# Patient Record
Sex: MALE | Race: OTHER | HISPANIC OR LATINO | Employment: OTHER | URBAN - METROPOLITAN AREA
[De-identification: names, ages, dates, MRNs, and addresses within clinical notes are randomized per-mention and may not be internally consistent; named-entity substitution may affect disease eponyms.]

---

## 2024-11-03 ENCOUNTER — APPOINTMENT (OUTPATIENT)
Dept: RADIOLOGY | Facility: HOSPITAL | Age: 74
End: 2024-11-03
Payer: COMMERCIAL

## 2024-11-03 ENCOUNTER — APPOINTMENT (OUTPATIENT)
Dept: CARDIOLOGY | Facility: HOSPITAL | Age: 74
End: 2024-11-03
Payer: COMMERCIAL

## 2024-11-03 ENCOUNTER — HOSPITAL ENCOUNTER (EMERGENCY)
Facility: HOSPITAL | Age: 74
Discharge: HOME | End: 2024-11-03
Attending: EMERGENCY MEDICINE
Payer: COMMERCIAL

## 2024-11-03 VITALS
OXYGEN SATURATION: 96 % | TEMPERATURE: 98.1 F | HEIGHT: 70 IN | HEART RATE: 71 BPM | DIASTOLIC BLOOD PRESSURE: 64 MMHG | RESPIRATION RATE: 18 BRPM | SYSTOLIC BLOOD PRESSURE: 158 MMHG | WEIGHT: 148 LBS | BODY MASS INDEX: 21.19 KG/M2

## 2024-11-03 DIAGNOSIS — R53.1 GENERALIZED WEAKNESS: Primary | ICD-10-CM
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
## 2024-11-03 DIAGNOSIS — E44.0 MODERATE PROTEIN-CALORIE MALNUTRITION (MULTI): ICD-10-CM

## 2024-11-03 LAB
ALBUMIN SERPL BCP-MCNC: 3 G/DL (ref 3.4–5)
ALP SERPL-CCNC: 77 U/L (ref 33–136)
ALT SERPL W P-5'-P-CCNC: 15 U/L (ref 10–52)
ANION GAP SERPL CALC-SCNC: 13 MMOL/L (ref 10–20)
AST SERPL W P-5'-P-CCNC: 15 U/L (ref 9–39)
BASOPHILS # BLD AUTO: 0.01 X10*3/UL (ref 0–0.1)
BASOPHILS NFR BLD AUTO: 0.3 %
BILIRUB SERPL-MCNC: 0.6 MG/DL (ref 0–1.2)
BUN SERPL-MCNC: 23 MG/DL (ref 6–23)
CALCIUM SERPL-MCNC: 8.2 MG/DL (ref 8.6–10.3)
CARDIAC TROPONIN I PNL SERPL HS: 22 NG/L (ref 0–20)
CARDIAC TROPONIN I PNL SERPL HS: 24 NG/L (ref 0–20)
CHLORIDE SERPL-SCNC: 100 MMOL/L (ref 98–107)
CO2 SERPL-SCNC: 27 MMOL/L (ref 21–32)
CREAT SERPL-MCNC: 1.2 MG/DL (ref 0.5–1.3)
EGFRCR SERPLBLD CKD-EPI 2021: 63 ML/MIN/1.73M*2
EOSINOPHIL # BLD AUTO: 0.01 X10*3/UL (ref 0–0.4)
EOSINOPHIL NFR BLD AUTO: 0.3 %
ERYTHROCYTE [DISTWIDTH] IN BLOOD BY AUTOMATED COUNT: 13.7 % (ref 11.5–14.5)
FLUAV RNA RESP QL NAA+PROBE: NOT DETECTED
FLUBV RNA RESP QL NAA+PROBE: NOT DETECTED
GLUCOSE SERPL-MCNC: 114 MG/DL (ref 74–99)
HCT VFR BLD AUTO: 29.7 % (ref 41–52)
HGB BLD-MCNC: 9.6 G/DL (ref 13.5–17.5)
HOLD SPECIMEN: NORMAL
IMM GRANULOCYTES # BLD AUTO: 0.03 X10*3/UL (ref 0–0.5)
IMM GRANULOCYTES NFR BLD AUTO: 1 % (ref 0–0.9)
LYMPHOCYTES # BLD AUTO: 0.44 X10*3/UL (ref 0.8–3)
LYMPHOCYTES NFR BLD AUTO: 15 %
MAGNESIUM SERPL-MCNC: 1.92 MG/DL (ref 1.6–2.4)
MCH RBC QN AUTO: 28.2 PG (ref 26–34)
MCHC RBC AUTO-ENTMCNC: 32.3 G/DL (ref 32–36)
MCV RBC AUTO: 87 FL (ref 80–100)
MONOCYTES # BLD AUTO: 0.32 X10*3/UL (ref 0.05–0.8)
MONOCYTES NFR BLD AUTO: 10.9 %
NEUTROPHILS # BLD AUTO: 2.13 X10*3/UL (ref 1.6–5.5)
NEUTROPHILS NFR BLD AUTO: 72.5 %
NRBC BLD-RTO: 0 /100 WBCS (ref 0–0)
PLATELET # BLD AUTO: 127 X10*3/UL (ref 150–450)
POTASSIUM SERPL-SCNC: 3.5 MMOL/L (ref 3.5–5.3)
PROT SERPL-MCNC: 4.9 G/DL (ref 6.4–8.2)
RBC # BLD AUTO: 3.4 X10*6/UL (ref 4.5–5.9)
SARS-COV-2 RNA RESP QL NAA+PROBE: NOT DETECTED
SODIUM SERPL-SCNC: 136 MMOL/L (ref 136–145)
WBC # BLD AUTO: 2.9 X10*3/UL (ref 4.4–11.3)

## 2024-11-03 PROCEDURE — 71045 X-RAY EXAM CHEST 1 VIEW: CPT | Performed by: RADIOLOGY

## 2024-11-03 PROCEDURE — 51798 US URINE CAPACITY MEASURE: CPT

## 2024-11-03 PROCEDURE — 85025 COMPLETE CBC W/AUTO DIFF WBC: CPT

## 2024-11-03 PROCEDURE — 83735 ASSAY OF MAGNESIUM: CPT

## 2024-11-03 PROCEDURE — 84484 ASSAY OF TROPONIN QUANT: CPT

## 2024-11-03 PROCEDURE — 93005 ELECTROCARDIOGRAM TRACING: CPT

## 2024-11-03 PROCEDURE — 80053 COMPREHEN METABOLIC PANEL: CPT

## 2024-11-03 PROCEDURE — 36415 COLL VENOUS BLD VENIPUNCTURE: CPT

## 2024-11-03 PROCEDURE — 36415 COLL VENOUS BLD VENIPUNCTURE: CPT | Performed by: EMERGENCY MEDICINE

## 2024-11-03 PROCEDURE — 71045 X-RAY EXAM CHEST 1 VIEW: CPT

## 2024-11-03 PROCEDURE — 84484 ASSAY OF TROPONIN QUANT: CPT | Performed by: EMERGENCY MEDICINE

## 2024-11-03 PROCEDURE — 87636 SARSCOV2 & INF A&B AMP PRB: CPT

## 2024-11-03 PROCEDURE — 72220 X-RAY EXAM SACRUM TAILBONE: CPT

## 2024-11-03 PROCEDURE — 72220 X-RAY EXAM SACRUM TAILBONE: CPT | Performed by: RADIOLOGY

## 2024-11-03 PROCEDURE — 99284 EMERGENCY DEPT VISIT MOD MDM: CPT | Mod: 25

## 2024-11-03 ASSESSMENT — PAIN SCALES - GENERAL: PAINLEVEL_OUTOF10: 6

## 2024-11-03 ASSESSMENT — COLUMBIA-SUICIDE SEVERITY RATING SCALE - C-SSRS
2. HAVE YOU ACTUALLY HAD ANY THOUGHTS OF KILLING YOURSELF?: NO
6. HAVE YOU EVER DONE ANYTHING, STARTED TO DO ANYTHING, OR PREPARED TO DO ANYTHING TO END YOUR LIFE?: NO
1. IN THE PAST MONTH, HAVE YOU WISHED YOU WERE DEAD OR WISHED YOU COULD GO TO SLEEP AND NOT WAKE UP?: NO

## 2024-11-03 ASSESSMENT — LIFESTYLE VARIABLES
EVER HAD A DRINK FIRST THING IN THE MORNING TO STEADY YOUR NERVES TO GET RID OF A HANGOVER: NO
EVER FELT BAD OR GUILTY ABOUT YOUR DRINKING: NO
TOTAL SCORE: 0
HAVE PEOPLE ANNOYED YOU BY CRITICIZING YOUR DRINKING: NO
HAVE YOU EVER FELT YOU SHOULD CUT DOWN ON YOUR DRINKING: NO

## 2024-11-03 ASSESSMENT — PAIN - FUNCTIONAL ASSESSMENT: PAIN_FUNCTIONAL_ASSESSMENT: 0-10

## 2024-11-04 LAB
ATRIAL RATE: 70 BPM
P AXIS: 75 DEGREES
P OFFSET: 192 MS
P ONSET: 149 MS
PR INTERVAL: 146 MS
Q ONSET: 222 MS
QRS COUNT: 11 BEATS
QRS DURATION: 82 MS
QT INTERVAL: 402 MS
QTC CALCULATION(BAZETT): 434 MS
QTC FREDERICIA: 423 MS
R AXIS: -36 DEGREES
T AXIS: 30 DEGREES
T OFFSET: 423 MS
VENTRICULAR RATE: 70 BPM

## 2024-11-04 NOTE — ED PROVIDER NOTES
EMERGENCY DEPARTMENT ENCOUNTER      Pt Name: Josias Ramirez  MRN: [id number]  Birthdate 1950  Date of evaluation: 11/3/2024  Provider: Bryon Landry DO    CHIEF COMPLAINT       Chief Complaint   Patient presents with    Fall    Weakness, Gen     HISTORY OF PRESENT ILLNESS    Josias Ramirez is a 74 y.o. year old male who presents to the ER for a fall.  The patient complains of generalized weakness which is why he fell.  Patient dropped his cane and when he bent over to pick it up he fell backwards.  Patient denies hitting his head, LOC, AMS, loss of bowel and bladder control.  The patient complains of sacral pain he landed.  Patient denies any radiation of the pain, reports that it is just sore.  The patient denies flulike symptoms.     Past medical history significant for squamous cell cancer of the nose, lung cancer, on chemo and radiation.  Last ministration of chemo and radiation was last week.  Patient has diabetes, hyper cholesterolemia, constipation.  PAST MEDICAL HISTORY   History reviewed. No pertinent past medical history.  CURRENT MEDICATIONS       There are no discharge medications for this patient.    SURGICAL HISTORY     History reviewed. No pertinent surgical history.  ALLERGIES     Penicillins and Tramadol  FAMILY HISTORY     No family history on file.  SOCIAL HISTORY       Social History     Tobacco Use    Smoking status: Not on file    Smokeless tobacco: Not on file   Substance Use Topics    Alcohol use: Not on file    Drug use: Not on file     PHYSICAL EXAM  (up to 7 for level 4, 8 or more for level 5)     ED Triage Vitals [11/03/24 1852]   Temperature Heart Rate Respirations BP   36.7 °C (98.1 °F) 82 18 148/51      Pulse Ox Temp src Heart Rate Source Patient Position   100 % -- -- --      BP Location FiO2 (%)     -- --       Physical Exam  Constitutional:       Appearance: He is cachectic. He is not toxic-appearing or diaphoretic.      Comments: Cachectic in appearance   HENT:      Head:  Normocephalic.      Nose: Nasal deformity present.      Comments: Deformity of the nose due to squamous cell cancer.  Eyes:      Conjunctiva/sclera: Conjunctivae normal.   Cardiovascular:      Rate and Rhythm: Normal rate and regular rhythm.   Pulmonary:      Effort: Pulmonary effort is normal.      Breath sounds: Normal breath sounds.   Abdominal:      General: Abdomen is flat.   Musculoskeletal:         General: No swelling.   Skin:     General: Skin is warm.        DIAGNOSTIC RESULTS   LABS:  Labs Reviewed   CBC WITH AUTO DIFFERENTIAL - Abnormal       Result Value    WBC 2.9 (*)     nRBC 0.0      RBC 3.40 (*)     Hemoglobin 9.6 (*)     Hematocrit 29.7 (*)     MCV 87      MCH 28.2      MCHC 32.3      RDW 13.7      Platelets 127 (*)     Neutrophils % 72.5      Immature Granulocytes %, Automated 1.0 (*)     Lymphocytes % 15.0      Monocytes % 10.9      Eosinophils % 0.3      Basophils % 0.3      Neutrophils Absolute 2.13      Immature Granulocytes Absolute, Automated 0.03      Lymphocytes Absolute 0.44 (*)     Monocytes Absolute 0.32      Eosinophils Absolute 0.01      Basophils Absolute 0.01     COMPREHENSIVE METABOLIC PANEL - Abnormal    Glucose 114 (*)     Sodium 136      Potassium 3.5      Chloride 100      Bicarbonate 27      Anion Gap 13      Urea Nitrogen 23      Creatinine 1.20      eGFR 63      Calcium 8.2 (*)     Albumin 3.0 (*)     Alkaline Phosphatase 77      Total Protein 4.9 (*)     AST 15      Bilirubin, Total 0.6      ALT 15     TROPONIN I, HIGH SENSITIVITY - Abnormal    Troponin I, High Sensitivity 22 (*)     Narrative:     Less than 99th percentile of normal range cutoff-  Female and children under 18 years old <14 ng/L; Male <21 ng/L: Negative  Repeat testing should be performed if clinically indicated.     Female and children under 18 years old 14-50 ng/L; Male 21-50 ng/L:  Consistent with possible cardiac damage and possible increased clinical   risk. Serial measurements may help to assess  extent of myocardial damage.     >50 ng/L: Consistent with cardiac damage, increased clinical risk and  myocardial infarction. Serial measurements may help assess extent of   myocardial damage.      NOTE: Children less than 1 year old may have higher baseline troponin   levels and results should be interpreted in conjunction with the overall   clinical context.     NOTE: Troponin I testing is performed using a different   testing methodology at [facility] than at other   [facility]. Direct result comparisons should only   be made within the same method.   TROPONIN I, HIGH SENSITIVITY - Abnormal    Troponin I, High Sensitivity 24 (*)     Narrative:     Less than 99th percentile of normal range cutoff-  Female and children under 18 years old <14 ng/L; Male <21 ng/L: Negative  Repeat testing should be performed if clinically indicated.     Female and children under 18 years old 14-50 ng/L; Male 21-50 ng/L:  Consistent with possible cardiac damage and possible increased clinical   risk. Serial measurements may help to assess extent of myocardial damage.     >50 ng/L: Consistent with cardiac damage, increased clinical risk and  myocardial infarction. Serial measurements may help assess extent of   myocardial damage.      NOTE: Children less than 1 year old may have higher baseline troponin   levels and results should be interpreted in conjunction with the overall   clinical context.     NOTE: Troponin I testing is performed using a different   testing methodology at [facility] than at other   [facility]. Direct result comparisons should only   be made within the same method.   MAGNESIUM - Normal    Magnesium 1.92     SARS-COV-2 PCR - Normal    Coronavirus 2019, PCR Not Detected      Narrative:     This assay has received FDA Emergency Use Authorization (EUA) and is only authorized for the duration of time that circumstances exist to justify the authorization of the emergency use of  in vitro diagnostic tests for the detection of SARS-CoV-2 virus and/or diagnosis of COVID-19 infection under section 564(b)(1) of the Act, 21 U.S.C. 360bbb-3(b)(1). This assay is an in vitro diagnostic nucleic acid amplification test for the qualitative detection of SARS-CoV-2 from nasopharyngeal specimens and has been validated for use at [facility]. Negative results do not preclude COVID-19 infections and should not be used as the sole basis for diagnosis, treatment, or other management decisions.     INFLUENZA A AND B PCR - Normal    Flu A Result Not Detected      Flu B Result Not Detected      Narrative:     This assay is an in vitro diagnostic multiplex nucleic acid amplification test for the detection and discrimination of Influenza A & B from nasopharyngeal specimens, and has been validated for use at [facility]. Negative results do not preclude Influenza A/B infections, and should not be used as the sole basis for diagnosis, treatment, or other management decisions. If Influenza A/B and RSV PCR results are negative, testing for Parainfluenza virus, Adenovirus and Metapneumovirus is routinely performed for [facility] [place], [place] pediatric oncology and intensive care inpatients, and is available on other patients by placing an add-on request.     All other labs were within normal range or not returned as of this dictation.  Imaging  XR chest 1 view   Final Result   No acute cardiopulmonary process.        MACRO:   None        Signed by: Anayeli Balderrama 11/3/2024 8:30 PM   Dictation workstation:   [id number]      XR sacrum coccyx 2+ views   Final Result   No sacrococcygeal fracture.             MACRO:   None        Signed by: Anayeli Balderrama 11/3/2024 8:32 PM   Dictation workstation:   [id number]         Procedure  Procedures  EMERGENCY DEPARTMENT COURSE/MDM:   Medical Decision Making  The patient is a 74-year-old male who comes to the ED for a fall and generalized weakness.   "Differential diagnosis include dehydration, post chemotherapy/radiation weakness, anemia, fractures.    The physical exam is significant for squamous cell cancer of the nose, the nasal bone was exposed.  There is some discharge but it is not purulent.  Granulation tissue was present.  The head was atraumatic, no evidence of lacerations or hematomas.  No deformities, injuries, ecchymosis in the lower extremities.  Lower extremity strength is 3 out of 5 bilaterally.  Patient was able to sit up without assistance in order for me to auscultate his lungs.  The lungs are clear to auscultation, no midline tenderness down the spine on palpation.     Labs were consistent with malnutrition.  Troponins were within normal limits, EKG was normal.  X-ray of the coccyx and sacrum did not show any acute fractures.     Due to the patient's labs and physical exam, the patient was diagnosed with malnutrition and generalized weakness. The patient was discharged and given return precautions. The patient was instructed to follow up with their oncologist and with their PCP in one week. The patient understood and was agreeable with the plan.     Amount and/or Complexity of Data Reviewed  Independent Historian: caregiver     Details: Sister      Vitals:    Vitals:    11/03/24 1852 11/03/24 2119 11/03/24 2240   BP: 148/51 148/56 158/64   Pulse: 82 72 71   Resp: 18 18 18   Temp: 36.7 °C (98.1 °F)     SpO2: 100% 95% 96%   Weight: 67.1 kg (148 lb)     Height: 1.778 m (5' 10\")           Diagnoses as of 11/03/24 2337   Generalized weakness   Fall, initial encounter   Moderate protein-calorie malnutrition (Multi)           Shared decision making for disposition  Patient and/or patient´s representative was counseled regarding labs, imaging, likely diagnosis. All questions were answered. Recommendation was made   for discharge home. The patient agreed and was discharged home in stable condition with appropriate relevant educational materials. Return " precautions were provided which included Increasing pain, weakness, loss of motion, numbness, tingling, pain out of proportion to light touch, significant temperature or color difference between sick limb and normal limb,  or worsening of your current condition despite current medical management plan..     ED Medications administered this visit:  Medications - No data to display    New Prescriptions from this visit:    There are no discharge medications for this patient.      Follow-up:  Leena Adams, APRN-CNP  [address] [address]  [phone]    In 1 week          Final Impression:   1. Generalized weakness    2. Fall, initial encounter    3. Moderate protein-calorie malnutrition (Multi)          Please excuse any misspellings or unintended errors related to the Dragon speech recognition software used to dictate this note.    I reviewed the case with the attending ED physician. The attending ED physician agrees with the plan.      Skyler Denise MD  Resident  11/03/24 2237      The patient was seen by the resident/fellow.  I have personally performed a substantive portion of the encounter.  I have seen and examined the patient; agree with the workup, evaluation, MDM, management and diagnosis.  The care plan has been discussed with the resident; I have reviewed the resident’s note and agree with the documented findings.      The patient appears cachectic, and he has weakness because he is not eating and drinking appropriately.  His protein level has dropped, and he has signs of malnutrition and again he is not drinking or eating much and only urinating twice a day.  The amount that he is drank here in the emergency room is more than what is drank all day per the sister.  I notified the patient that he is on the course of severe malnutrition, muscle wasting, and delayed healing or no healing because of the lack of protein in the system.  He needs to have some higher protein drinks,  energy drinks, and trying to get food into his system of some sort.  At the very least he needs multiple protein drinks a day I recommended Premier energy drink as it has 30 g of protein on top of the boost which only drinks 1 of a day.  If he does not like to primarily drinks and he likes the booster that he should drink at least 3 boosts a day.  If he cannot eat or drink appropriately, then feeding tube should be considered if he continues with the chemotherapy and treatment for his cancer.  He is to follow with his primary care doctor for further evaluation and return to the emergency room for any worsening concerning symptoms otherwise.                             Bryon Landry, DO  11/03/24 1212

## 2024-11-04 NOTE — DISCHARGE INSTRUCTIONS
You were assessed in the ED for generalized weakness.  Your labs showed that you have malnutrition.  The swab showed that you do not have flu or COVID.  Your labs and the EKG showed that you do not have a heart attack.     Try to eat more, if you are unable or unwilling to eat solid foods, drink more of the meal replacement shakes.  You can have boost or Ensure.  You can try edibles to increase your appetite.     Follow-up with your primary care provider in 1 week.

## 2024-11-14 ENCOUNTER — APPOINTMENT (OUTPATIENT)
Dept: RADIOLOGY | Facility: HOSPITAL | Age: 74
End: 2024-11-14
Payer: COMMERCIAL

## 2024-11-14 ENCOUNTER — HOSPITAL ENCOUNTER (OUTPATIENT)
Facility: HOSPITAL | Age: 74
Setting detail: OBSERVATION
Discharge: HOME HEALTH | End: 2024-11-16
Attending: EMERGENCY MEDICINE | Admitting: INTERNAL MEDICINE
Payer: COMMERCIAL

## 2024-11-14 DIAGNOSIS — R33.8 ACUTE RETENTION OF URINE: ICD-10-CM

## 2024-11-14 DIAGNOSIS — F17.200 TOBACCO USE DISORDER: ICD-10-CM

## 2024-11-14 DIAGNOSIS — E43 SEVERE PROTEIN-CALORIE MALNUTRITION (MULTI): ICD-10-CM
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
## 2024-11-14 DIAGNOSIS — R53.1 WEAKNESS: ICD-10-CM

## 2024-11-14 DIAGNOSIS — M54.6 THORACIC BACK PAIN, UNSPECIFIED BACK PAIN LATERALITY, UNSPECIFIED CHRONICITY: ICD-10-CM

## 2024-11-14 LAB
ALBUMIN SERPL BCP-MCNC: 3.2 G/DL (ref 3.4–5)
ALP SERPL-CCNC: 73 U/L (ref 33–136)
ALT SERPL W P-5'-P-CCNC: 20 U/L (ref 10–52)
ANION GAP SERPL CALC-SCNC: 22 MMOL/L (ref 10–20)
AST SERPL W P-5'-P-CCNC: 25 U/L (ref 9–39)
BASOPHILS # BLD AUTO: 0.03 X10*3/UL (ref 0–0.1)
BASOPHILS NFR BLD AUTO: 0.5 %
BILIRUB SERPL-MCNC: 0.5 MG/DL (ref 0–1.2)
BNP SERPL-MCNC: 120 PG/ML (ref 0–99)
BUN SERPL-MCNC: 27 MG/DL (ref 6–23)
CALCIUM SERPL-MCNC: 8.4 MG/DL (ref 8.6–10.3)
CARDIAC TROPONIN I PNL SERPL HS: 16 NG/L (ref 0–20)
CHLORIDE SERPL-SCNC: 98 MMOL/L (ref 98–107)
CO2 SERPL-SCNC: 21 MMOL/L (ref 21–32)
CREAT SERPL-MCNC: 1.44 MG/DL (ref 0.5–1.3)
EGFRCR SERPLBLD CKD-EPI 2021: 51 ML/MIN/1.73M*2
EOSINOPHIL # BLD AUTO: 0.01 X10*3/UL (ref 0–0.4)
EOSINOPHIL NFR BLD AUTO: 0.2 %
ERYTHROCYTE [DISTWIDTH] IN BLOOD BY AUTOMATED COUNT: 15.8 % (ref 11.5–14.5)
GLUCOSE SERPL-MCNC: 144 MG/DL (ref 74–99)
HCT VFR BLD AUTO: 29.6 % (ref 41–52)
HGB BLD-MCNC: 9.4 G/DL (ref 13.5–17.5)
IMM GRANULOCYTES # BLD AUTO: 0.12 X10*3/UL (ref 0–0.5)
IMM GRANULOCYTES NFR BLD AUTO: 1.9 % (ref 0–0.9)
LYMPHOCYTES # BLD AUTO: 1.1 X10*3/UL (ref 0.8–3)
LYMPHOCYTES NFR BLD AUTO: 17.5 %
MCH RBC QN AUTO: 28.1 PG (ref 26–34)
MCHC RBC AUTO-ENTMCNC: 31.8 G/DL (ref 32–36)
MCV RBC AUTO: 89 FL (ref 80–100)
MONOCYTES # BLD AUTO: 0.47 X10*3/UL (ref 0.05–0.8)
MONOCYTES NFR BLD AUTO: 7.5 %
NEUTROPHILS # BLD AUTO: 4.56 X10*3/UL (ref 1.6–5.5)
NEUTROPHILS NFR BLD AUTO: 72.4 %
NRBC BLD-RTO: 0 /100 WBCS (ref 0–0)
PLATELET # BLD AUTO: 162 X10*3/UL (ref 150–450)
POTASSIUM SERPL-SCNC: 4.6 MMOL/L (ref 3.5–5.3)
PROT SERPL-MCNC: 5.4 G/DL (ref 6.4–8.2)
RBC # BLD AUTO: 3.34 X10*6/UL (ref 4.5–5.9)
SODIUM SERPL-SCNC: 136 MMOL/L (ref 136–145)
WBC # BLD AUTO: 6.3 X10*3/UL (ref 4.4–11.3)

## 2024-11-14 PROCEDURE — 99285 EMERGENCY DEPT VISIT HI MDM: CPT | Mod: 25

## 2024-11-14 PROCEDURE — 72125 CT NECK SPINE W/O DYE: CPT | Performed by: RADIOLOGY

## 2024-11-14 PROCEDURE — 2500000004 HC RX 250 GENERAL PHARMACY W/ HCPCS (ALT 636 FOR OP/ED): Performed by: EMERGENCY MEDICINE

## 2024-11-14 PROCEDURE — 36415 COLL VENOUS BLD VENIPUNCTURE: CPT

## 2024-11-14 PROCEDURE — 72131 CT LUMBAR SPINE W/O DYE: CPT | Mod: FOREIGN READ | Performed by: RADIOLOGY

## 2024-11-14 PROCEDURE — 96375 TX/PRO/DX INJ NEW DRUG ADDON: CPT | Mod: 59

## 2024-11-14 PROCEDURE — 2550000001 HC RX 255 CONTRASTS: Performed by: EMERGENCY MEDICINE

## 2024-11-14 PROCEDURE — 72128 CT CHEST SPINE W/O DYE: CPT | Mod: RCN

## 2024-11-14 PROCEDURE — 2500000004 HC RX 250 GENERAL PHARMACY W/ HCPCS (ALT 636 FOR OP/ED)

## 2024-11-14 PROCEDURE — 70450 CT HEAD/BRAIN W/O DYE: CPT | Performed by: RADIOLOGY

## 2024-11-14 PROCEDURE — 70450 CT HEAD/BRAIN W/O DYE: CPT

## 2024-11-14 PROCEDURE — 85025 COMPLETE CBC W/AUTO DIFF WBC: CPT

## 2024-11-14 PROCEDURE — 72128 CT CHEST SPINE W/O DYE: CPT | Mod: FOREIGN READ | Performed by: RADIOLOGY

## 2024-11-14 PROCEDURE — 72131 CT LUMBAR SPINE W/O DYE: CPT | Mod: RCN

## 2024-11-14 PROCEDURE — 71260 CT THORAX DX C+: CPT

## 2024-11-14 PROCEDURE — 74177 CT ABD & PELVIS W/CONTRAST: CPT | Mod: FOREIGN READ | Performed by: RADIOLOGY

## 2024-11-14 PROCEDURE — 84484 ASSAY OF TROPONIN QUANT: CPT | Performed by: EMERGENCY MEDICINE

## 2024-11-14 PROCEDURE — 96361 HYDRATE IV INFUSION ADD-ON: CPT | Mod: 59

## 2024-11-14 PROCEDURE — 72125 CT NECK SPINE W/O DYE: CPT

## 2024-11-14 PROCEDURE — 83880 ASSAY OF NATRIURETIC PEPTIDE: CPT

## 2024-11-14 PROCEDURE — 80053 COMPREHEN METABOLIC PANEL: CPT

## 2024-11-14 PROCEDURE — 71260 CT THORAX DX C+: CPT | Mod: FOREIGN READ | Performed by: RADIOLOGY

## 2024-11-14 ASSESSMENT — LIFESTYLE VARIABLES
HAVE PEOPLE ANNOYED YOU BY CRITICIZING YOUR DRINKING: NO
EVER FELT BAD OR GUILTY ABOUT YOUR DRINKING: NO
EVER HAD A DRINK FIRST THING IN THE MORNING TO STEADY YOUR NERVES TO GET RID OF A HANGOVER: NO
HAVE YOU EVER FELT YOU SHOULD CUT DOWN ON YOUR DRINKING: NO
TOTAL SCORE: 0

## 2024-11-14 ASSESSMENT — COLUMBIA-SUICIDE SEVERITY RATING SCALE - C-SSRS
1. IN THE PAST MONTH, HAVE YOU WISHED YOU WERE DEAD OR WISHED YOU COULD GO TO SLEEP AND NOT WAKE UP?: NO
6. HAVE YOU EVER DONE ANYTHING, STARTED TO DO ANYTHING, OR PREPARED TO DO ANYTHING TO END YOUR LIFE?: NO
2. HAVE YOU ACTUALLY HAD ANY THOUGHTS OF KILLING YOURSELF?: NO

## 2024-11-14 ASSESSMENT — PAIN DESCRIPTION - LOCATION: LOCATION: OTHER (COMMENT)

## 2024-11-14 ASSESSMENT — PAIN - FUNCTIONAL ASSESSMENT: PAIN_FUNCTIONAL_ASSESSMENT: 0-10

## 2024-11-14 ASSESSMENT — PAIN SCALES - GENERAL
PAINLEVEL_OUTOF10: 10 - WORST POSSIBLE PAIN
PAINLEVEL_OUTOF10: 10 - WORST POSSIBLE PAIN

## 2024-11-15 ENCOUNTER — HOME HEALTH ADMISSION (OUTPATIENT)
Dept: HOME HEALTH SERVICES | Facility: HOME HEALTH | Age: 74
End: 2024-11-15
Payer: COMMERCIAL

## 2024-11-15 ENCOUNTER — APPOINTMENT (OUTPATIENT)
Dept: RADIOLOGY | Facility: HOSPITAL | Age: 74
End: 2024-11-15
Payer: COMMERCIAL

## 2024-11-15 PROBLEM — E43 SEVERE PROTEIN-CALORIE MALNUTRITION (MULTI): Status: ACTIVE | Noted: 2024-11-15

## 2024-11-15 PROBLEM — N17.9 AKI (ACUTE KIDNEY INJURY) (CMS-HCC): Status: ACTIVE | Noted: 2024-11-15

## 2024-11-15 PROBLEM — E46 PROTEIN-CALORIE MALNUTRITION (MULTI): Status: ACTIVE | Noted: 2024-11-15

## 2024-11-15 PROBLEM — F17.200 TOBACCO USE DISORDER: Status: ACTIVE | Noted: 2024-11-15
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
## 2024-11-15 LAB
ANION GAP SERPL CALC-SCNC: 14 MMOL/L (ref 10–20)
APPEARANCE UR: ABNORMAL
BILIRUB UR STRIP.AUTO-MCNC: NEGATIVE MG/DL
BUN SERPL-MCNC: 22 MG/DL (ref 6–23)
CALCIUM SERPL-MCNC: 7.7 MG/DL (ref 8.6–10.3)
CHLORIDE SERPL-SCNC: 101 MMOL/L (ref 98–107)
CO2 SERPL-SCNC: 27 MMOL/L (ref 21–32)
COLOR UR: ABNORMAL
CREAT SERPL-MCNC: 1.13 MG/DL (ref 0.5–1.3)
EGFRCR SERPLBLD CKD-EPI 2021: 68 ML/MIN/1.73M*2
ERYTHROCYTE [DISTWIDTH] IN BLOOD BY AUTOMATED COUNT: 15.9 % (ref 11.5–14.5)
FERRITIN SERPL-MCNC: 785 NG/ML (ref 20–300)
GLUCOSE BLD MANUAL STRIP-MCNC: 134 MG/DL (ref 74–99)
GLUCOSE BLD MANUAL STRIP-MCNC: 159 MG/DL (ref 74–99)
GLUCOSE BLD MANUAL STRIP-MCNC: 176 MG/DL (ref 74–99)
GLUCOSE BLD MANUAL STRIP-MCNC: 190 MG/DL (ref 74–99)
GLUCOSE BLD MANUAL STRIP-MCNC: 38 MG/DL (ref 74–99)
GLUCOSE SERPL-MCNC: 47 MG/DL (ref 74–99)
GLUCOSE UR STRIP.AUTO-MCNC: NORMAL MG/DL
HCT VFR BLD AUTO: 27.4 % (ref 41–52)
HGB BLD-MCNC: 8.7 G/DL (ref 13.5–17.5)
IRON SATN MFR SERPL: 34 % (ref 25–45)
IRON SERPL-MCNC: 50 UG/DL (ref 35–150)
KETONES UR STRIP.AUTO-MCNC: ABNORMAL MG/DL
LEUKOCYTE ESTERASE UR QL STRIP.AUTO: NEGATIVE
MAGNESIUM SERPL-MCNC: 1.48 MG/DL (ref 1.6–2.4)
MCH RBC QN AUTO: 27.9 PG (ref 26–34)
MCHC RBC AUTO-ENTMCNC: 31.8 G/DL (ref 32–36)
MCV RBC AUTO: 88 FL (ref 80–100)
NITRITE UR QL STRIP.AUTO: NEGATIVE
NRBC BLD-RTO: 0 /100 WBCS (ref 0–0)
PH UR STRIP.AUTO: 5.5 [PH]
PHOSPHATE SERPL-MCNC: 3.8 MG/DL (ref 2.5–4.9)
PLATELET # BLD AUTO: 146 X10*3/UL (ref 150–450)
POTASSIUM SERPL-SCNC: 3.9 MMOL/L (ref 3.5–5.3)
PROT UR STRIP.AUTO-MCNC: NEGATIVE MG/DL
RBC # BLD AUTO: 3.12 X10*6/UL (ref 4.5–5.9)
RBC # UR STRIP.AUTO: NEGATIVE /UL
SODIUM SERPL-SCNC: 138 MMOL/L (ref 136–145)
SP GR UR STRIP.AUTO: 1.04
TIBC SERPL-MCNC: 148 UG/DL (ref 240–445)
UIBC SERPL-MCNC: 98 UG/DL (ref 110–370)
UROBILINOGEN UR STRIP.AUTO-MCNC: NORMAL MG/DL
WBC # BLD AUTO: 5.6 X10*3/UL (ref 4.4–11.3)

## 2024-11-15 PROCEDURE — 83735 ASSAY OF MAGNESIUM: CPT

## 2024-11-15 PROCEDURE — 2500000002 HC RX 250 W HCPCS SELF ADMINISTERED DRUGS (ALT 637 FOR MEDICARE OP, ALT 636 FOR OP/ED): Performed by: NURSE PRACTITIONER

## 2024-11-15 PROCEDURE — S4991 NICOTINE PATCH NONLEGEND: HCPCS

## 2024-11-15 PROCEDURE — 96365 THER/PROPH/DIAG IV INF INIT: CPT | Mod: 59

## 2024-11-15 PROCEDURE — 36415 COLL VENOUS BLD VENIPUNCTURE: CPT

## 2024-11-15 PROCEDURE — 2500000004 HC RX 250 GENERAL PHARMACY W/ HCPCS (ALT 636 FOR OP/ED): Performed by: NURSE PRACTITIONER

## 2024-11-15 PROCEDURE — 83540 ASSAY OF IRON: CPT

## 2024-11-15 PROCEDURE — 2500000001 HC RX 250 WO HCPCS SELF ADMINISTERED DRUGS (ALT 637 FOR MEDICARE OP): Performed by: NURSE PRACTITIONER

## 2024-11-15 PROCEDURE — 73610 X-RAY EXAM OF ANKLE: CPT | Mod: LT

## 2024-11-15 PROCEDURE — G0378 HOSPITAL OBSERVATION PER HR: HCPCS

## 2024-11-15 PROCEDURE — 2500000002 HC RX 250 W HCPCS SELF ADMINISTERED DRUGS (ALT 637 FOR MEDICARE OP, ALT 636 FOR OP/ED)

## 2024-11-15 PROCEDURE — 84100 ASSAY OF PHOSPHORUS: CPT

## 2024-11-15 PROCEDURE — 96366 THER/PROPH/DIAG IV INF ADDON: CPT | Mod: 59

## 2024-11-15 PROCEDURE — 82947 ASSAY GLUCOSE BLOOD QUANT: CPT | Mod: 59

## 2024-11-15 PROCEDURE — 97116 GAIT TRAINING THERAPY: CPT | Mod: GP

## 2024-11-15 PROCEDURE — 81003 URINALYSIS AUTO W/O SCOPE: CPT

## 2024-11-15 PROCEDURE — 82728 ASSAY OF FERRITIN: CPT

## 2024-11-15 PROCEDURE — 2500000004 HC RX 250 GENERAL PHARMACY W/ HCPCS (ALT 636 FOR OP/ED)

## 2024-11-15 PROCEDURE — 99221 1ST HOSP IP/OBS SF/LOW 40: CPT

## 2024-11-15 PROCEDURE — 80048 BASIC METABOLIC PNL TOTAL CA: CPT

## 2024-11-15 PROCEDURE — 2500000005 HC RX 250 GENERAL PHARMACY W/O HCPCS

## 2024-11-15 PROCEDURE — 85027 COMPLETE CBC AUTOMATED: CPT

## 2024-11-15 PROCEDURE — 96375 TX/PRO/DX INJ NEW DRUG ADDON: CPT | Mod: 59

## 2024-11-15 PROCEDURE — 97161 PT EVAL LOW COMPLEX 20 MIN: CPT | Mod: GP

## 2024-11-15 PROCEDURE — 96372 THER/PROPH/DIAG INJ SC/IM: CPT

## 2024-11-15 PROCEDURE — 73610 X-RAY EXAM OF ANKLE: CPT | Mod: LEFT SIDE | Performed by: RADIOLOGY

## 2024-11-15 PROCEDURE — 96361 HYDRATE IV INFUSION ADD-ON: CPT | Mod: 59

## 2024-11-15 RX ORDER — ACETAMINOPHEN 160 MG/5ML
650 SOLUTION ORAL EVERY 6 HOURS PRN
Status: DISCONTINUED | OUTPATIENT
Start: 2024-11-15 | End: 2024-11-16 | Stop reason: HOSPADM

## 2024-11-15 RX ORDER — ACETAMINOPHEN 325 MG/1
650 TABLET ORAL EVERY 6 HOURS PRN
Status: DISCONTINUED | OUTPATIENT
Start: 2024-11-15 | End: 2024-11-16 | Stop reason: HOSPADM

## 2024-11-15 RX ORDER — DEXTROSE 50 % IN WATER (D50W) INTRAVENOUS SYRINGE
12.5
Status: DISCONTINUED | OUTPATIENT
Start: 2024-11-15 | End: 2024-11-16 | Stop reason: HOSPADM

## 2024-11-15 RX ORDER — GABAPENTIN 400 MG/1
800 CAPSULE ORAL 3 TIMES DAILY
Status: DISCONTINUED | OUTPATIENT
Start: 2024-11-15 | End: 2024-11-16 | Stop reason: HOSPADM

## 2024-11-15 RX ORDER — ACETAMINOPHEN 650 MG/1
650 SUPPOSITORY RECTAL EVERY 6 HOURS PRN
Status: DISCONTINUED | OUTPATIENT
Start: 2024-11-15 | End: 2024-11-16 | Stop reason: HOSPADM

## 2024-11-15 RX ORDER — GLIPIZIDE 10 MG/1
20 TABLET, FILM COATED, EXTENDED RELEASE ORAL DAILY
COMMUNITY
End: 2024-11-16 | Stop reason: HOSPADM

## 2024-11-15 RX ORDER — WATER
400 LIQUID (ML) MISCELLANEOUS
Status: DISCONTINUED | OUTPATIENT
Start: 2024-11-15 | End: 2024-11-16 | Stop reason: HOSPADM

## 2024-11-15 RX ORDER — LIDOCAINE 560 MG/1
2 PATCH PERCUTANEOUS; TOPICAL; TRANSDERMAL DAILY
Status: DISCONTINUED | OUTPATIENT
Start: 2024-11-15 | End: 2024-11-16 | Stop reason: HOSPADM

## 2024-11-15 RX ORDER — DEXTROSE 50 % IN WATER (D50W) INTRAVENOUS SYRINGE
25
Status: DISCONTINUED | OUTPATIENT
Start: 2024-11-15 | End: 2024-11-16 | Stop reason: HOSPADM

## 2024-11-15 RX ORDER — IBUPROFEN 200 MG
1 TABLET ORAL DAILY
Status: DISCONTINUED | OUTPATIENT
Start: 2024-11-15 | End: 2024-11-16 | Stop reason: HOSPADM

## 2024-11-15 RX ORDER — INSULIN LISPRO 100 [IU]/ML
0-10 INJECTION, SOLUTION INTRAVENOUS; SUBCUTANEOUS
Status: DISCONTINUED | OUTPATIENT
Start: 2024-11-15 | End: 2024-11-16 | Stop reason: HOSPADM

## 2024-11-15 RX ORDER — MAGNESIUM SULFATE HEPTAHYDRATE 40 MG/ML
2 INJECTION, SOLUTION INTRAVENOUS ONCE
Status: COMPLETED | OUTPATIENT
Start: 2024-11-15 | End: 2024-11-15

## 2024-11-15 RX ORDER — NYSTATIN 100000 [USP'U]/ML
5 SUSPENSION ORAL 4 TIMES DAILY
Status: DISCONTINUED | OUTPATIENT
Start: 2024-11-15 | End: 2024-11-16 | Stop reason: HOSPADM

## 2024-11-15 RX ORDER — GABAPENTIN 800 MG/1
800 TABLET ORAL 3 TIMES DAILY
Status: ON HOLD | COMMUNITY

## 2024-11-15 RX ORDER — ALUMINUM HYDROXIDE, MAGNESIUM HYDROXIDE, AND SIMETHICONE 1200; 120; 1200 MG/30ML; MG/30ML; MG/30ML
10 SUSPENSION ORAL 4 TIMES DAILY PRN
Status: DISCONTINUED | OUTPATIENT
Start: 2024-11-15 | End: 2024-11-16 | Stop reason: HOSPADM

## 2024-11-15 RX ORDER — TIZANIDINE 4 MG/1
4 TABLET ORAL 2 TIMES DAILY PRN
Status: ON HOLD | COMMUNITY

## 2024-11-15 RX ORDER — TALC
3 POWDER (GRAM) TOPICAL NIGHTLY PRN
Status: DISCONTINUED | OUTPATIENT
Start: 2024-11-15 | End: 2024-11-16 | Stop reason: HOSPADM

## 2024-11-15 RX ORDER — POTASSIUM CHLORIDE 750 MG/1
10 TABLET, FILM COATED, EXTENDED RELEASE ORAL ONCE
Status: DISCONTINUED | OUTPATIENT
Start: 2024-11-15 | End: 2024-11-15

## 2024-11-15 RX ORDER — LIDOCAINE HYDROCHLORIDE 20 MG/ML
10 SOLUTION OROPHARYNGEAL 4 TIMES DAILY PRN
Status: DISCONTINUED | OUTPATIENT
Start: 2024-11-15 | End: 2024-11-16 | Stop reason: HOSPADM

## 2024-11-15 RX ORDER — HEPARIN SODIUM 5000 [USP'U]/ML
5000 INJECTION, SOLUTION INTRAVENOUS; SUBCUTANEOUS EVERY 8 HOURS
Status: DISCONTINUED | OUTPATIENT
Start: 2024-11-15 | End: 2024-11-16 | Stop reason: HOSPADM

## 2024-11-15 RX ORDER — NYSTATIN 100000 [USP'U]/ML
5 SUSPENSION ORAL 4 TIMES DAILY
Status: ON HOLD | COMMUNITY

## 2024-11-15 RX ORDER — POTASSIUM CHLORIDE 1.5 G/1.58G
20 POWDER, FOR SOLUTION ORAL ONCE
Status: COMPLETED | OUTPATIENT
Start: 2024-11-15 | End: 2024-11-15

## 2024-11-15 RX ORDER — GLIPIZIDE 5 MG/1
20 TABLET, FILM COATED, EXTENDED RELEASE ORAL DAILY
Status: DISCONTINUED | OUTPATIENT
Start: 2024-11-15 | End: 2024-11-16

## 2024-11-15 RX ORDER — POLYETHYLENE GLYCOL 3350 17 G/17G
17 POWDER, FOR SOLUTION ORAL DAILY PRN
Status: DISCONTINUED | OUTPATIENT
Start: 2024-11-15 | End: 2024-11-16 | Stop reason: HOSPADM

## 2024-11-15 RX ORDER — TIZANIDINE 4 MG/1
4 TABLET ORAL 2 TIMES DAILY PRN
Status: DISCONTINUED | OUTPATIENT
Start: 2024-11-15 | End: 2024-11-16 | Stop reason: HOSPADM

## 2024-11-15 RX ORDER — LIDOCAINE 560 MG/1
2 PATCH PERCUTANEOUS; TOPICAL; TRANSDERMAL DAILY
Status: DISCONTINUED | OUTPATIENT
Start: 2024-11-15 | End: 2024-11-15

## 2024-11-15 RX ORDER — LISINOPRIL 2.5 MG/1
2.5 TABLET ORAL DAILY
Status: DISCONTINUED | OUTPATIENT
Start: 2024-11-15 | End: 2024-11-16 | Stop reason: HOSPADM

## 2024-11-15 RX ORDER — LISINOPRIL 2.5 MG/1
2.5 TABLET ORAL DAILY
Status: ON HOLD | COMMUNITY

## 2024-11-15 SDOH — SOCIAL STABILITY: SOCIAL NETWORK: HOW OFTEN DO YOU ATTEND CHURCH OR RELIGIOUS SERVICES?: MORE THAN 4 TIMES PER YEAR

## 2024-11-15 SDOH — ECONOMIC STABILITY: FOOD INSECURITY: WITHIN THE PAST 12 MONTHS, THE FOOD YOU BOUGHT JUST DIDN'T LAST AND YOU DIDN'T HAVE MONEY TO GET MORE.: NEVER TRUE

## 2024-11-15 SDOH — SOCIAL STABILITY: SOCIAL INSECURITY: WITHIN THE LAST YEAR, HAVE YOU BEEN HUMILIATED OR EMOTIONALLY ABUSED IN OTHER WAYS BY YOUR PARTNER OR EX-PARTNER?: NO

## 2024-11-15 SDOH — SOCIAL STABILITY: SOCIAL INSECURITY: HAVE YOU HAD THOUGHTS OF HARMING ANYONE ELSE?: NO

## 2024-11-15 SDOH — HEALTH STABILITY: MENTAL HEALTH
DO YOU FEEL STRESS - TENSE, RESTLESS, NERVOUS, OR ANXIOUS, OR UNABLE TO SLEEP AT NIGHT BECAUSE YOUR MIND IS TROUBLED ALL THE TIME - THESE DAYS?: NOT AT ALL

## 2024-11-15 SDOH — ECONOMIC STABILITY: HOUSING INSECURITY: IN THE LAST 12 MONTHS, WAS THERE A TIME WHEN YOU WERE NOT ABLE TO PAY THE MORTGAGE OR RENT ON TIME?: NO

## 2024-11-15 SDOH — ECONOMIC STABILITY: TRANSPORTATION INSECURITY: IN THE PAST 12 MONTHS, HAS LACK OF TRANSPORTATION KEPT YOU FROM MEDICAL APPOINTMENTS OR FROM GETTING MEDICATIONS?: NO

## 2024-11-15 SDOH — ECONOMIC STABILITY: FOOD INSECURITY: WITHIN THE PAST 12 MONTHS, YOU WORRIED THAT YOUR FOOD WOULD RUN OUT BEFORE YOU GOT THE MONEY TO BUY MORE.: NEVER TRUE

## 2024-11-15 SDOH — ECONOMIC STABILITY: INCOME INSECURITY: IN THE PAST 12 MONTHS HAS THE ELECTRIC, GAS, OIL, OR WATER COMPANY THREATENED TO SHUT OFF SERVICES IN YOUR HOME?: NO

## 2024-11-15 SDOH — SOCIAL STABILITY: SOCIAL INSECURITY: HAVE YOU HAD ANY THOUGHTS OF HARMING ANYONE ELSE?: NO

## 2024-11-15 SDOH — SOCIAL STABILITY: SOCIAL INSECURITY: WITHIN THE LAST YEAR, HAVE YOU BEEN AFRAID OF YOUR PARTNER OR EX-PARTNER?: NO

## 2024-11-15 SDOH — SOCIAL STABILITY: SOCIAL INSECURITY
WITHIN THE LAST YEAR, HAVE YOU BEEN KICKED, HIT, SLAPPED, OR OTHERWISE PHYSICALLY HURT BY YOUR PARTNER OR EX-PARTNER?: NO

## 2024-11-15 SDOH — ECONOMIC STABILITY: HOUSING INSECURITY: AT ANY TIME IN THE PAST 12 MONTHS, WERE YOU HOMELESS OR LIVING IN A SHELTER (INCLUDING NOW)?: NO

## 2024-11-15 SDOH — HEALTH STABILITY: PHYSICAL HEALTH: ON AVERAGE, HOW MANY DAYS PER WEEK DO YOU ENGAGE IN MODERATE TO STRENUOUS EXERCISE (LIKE A BRISK WALK)?: 0 DAYS

## 2024-11-15 SDOH — SOCIAL STABILITY: SOCIAL NETWORK
DO YOU BELONG TO ANY CLUBS OR ORGANIZATIONS SUCH AS CHURCH GROUPS, UNIONS, FRATERNAL OR ATHLETIC GROUPS, OR SCHOOL GROUPS?: NO

## 2024-11-15 SDOH — HEALTH STABILITY: PHYSICAL HEALTH: ON AVERAGE, HOW MANY MINUTES DO YOU ENGAGE IN EXERCISE AT THIS LEVEL?: 0 MIN

## 2024-11-15 SDOH — SOCIAL STABILITY: SOCIAL INSECURITY
WITHIN THE LAST YEAR, HAVE YOU BEEN RAPED OR FORCED TO HAVE ANY KIND OF SEXUAL ACTIVITY BY YOUR PARTNER OR EX-PARTNER?: NO

## 2024-11-15 SDOH — HEALTH STABILITY: PHYSICAL HEALTH
HOW OFTEN DO YOU NEED TO HAVE SOMEONE HELP YOU WHEN YOU READ INSTRUCTIONS, PAMPHLETS, OR OTHER WRITTEN MATERIAL FROM YOUR DOCTOR OR PHARMACY?: NEVER

## 2024-11-15 SDOH — SOCIAL STABILITY: SOCIAL INSECURITY: ARE YOU MARRIED, WIDOWED, DIVORCED, SEPARATED, NEVER MARRIED, OR LIVING WITH A PARTNER?: DIVORCED

## 2024-11-15 SDOH — SOCIAL STABILITY: SOCIAL INSECURITY: ARE THERE ANY APPARENT SIGNS OF INJURIES/BEHAVIORS THAT COULD BE RELATED TO ABUSE/NEGLECT?: NO

## 2024-11-15 SDOH — ECONOMIC STABILITY: HOUSING INSECURITY: IN THE PAST 12 MONTHS, HOW MANY TIMES HAVE YOU MOVED WHERE YOU WERE LIVING?: 1

## 2024-11-15 SDOH — SOCIAL STABILITY: SOCIAL NETWORK: HOW OFTEN DO YOU ATTEND MEETINGS OF THE CLUBS OR ORGANIZATIONS YOU BELONG TO?: NEVER

## 2024-11-15 SDOH — SOCIAL STABILITY: SOCIAL INSECURITY: WERE YOU ABLE TO COMPLETE ALL THE BEHAVIORAL HEALTH SCREENINGS?: YES

## 2024-11-15 SDOH — SOCIAL STABILITY: SOCIAL NETWORK
IN A TYPICAL WEEK, HOW MANY TIMES DO YOU TALK ON THE PHONE WITH FAMILY, FRIENDS, OR NEIGHBORS?: MORE THAN THREE TIMES A WEEK

## 2024-11-15 SDOH — ECONOMIC STABILITY: FOOD INSECURITY: HOW HARD IS IT FOR YOU TO PAY FOR THE VERY BASICS LIKE FOOD, HOUSING, MEDICAL CARE, AND HEATING?: NOT HARD AT ALL

## 2024-11-15 SDOH — SOCIAL STABILITY: SOCIAL INSECURITY: DO YOU FEEL UNSAFE GOING BACK TO THE PLACE WHERE YOU ARE LIVING?: NO

## 2024-11-15 SDOH — SOCIAL STABILITY: SOCIAL NETWORK: HOW OFTEN DO YOU GET TOGETHER WITH FRIENDS OR RELATIVES?: THREE TIMES A WEEK

## 2024-11-15 SDOH — SOCIAL STABILITY: SOCIAL INSECURITY: ABUSE: ADULT

## 2024-11-15 SDOH — SOCIAL STABILITY: SOCIAL INSECURITY: DO YOU FEEL ANYONE HAS EXPLOITED OR TAKEN ADVANTAGE OF YOU FINANCIALLY OR OF YOUR PERSONAL PROPERTY?: NO

## 2024-11-15 SDOH — SOCIAL STABILITY: SOCIAL INSECURITY: ARE YOU OR HAVE YOU BEEN THREATENED OR ABUSED PHYSICALLY, EMOTIONALLY, OR SEXUALLY BY ANYONE?: NO

## 2024-11-15 SDOH — SOCIAL STABILITY: SOCIAL INSECURITY: DOES ANYONE TRY TO KEEP YOU FROM HAVING/CONTACTING OTHER FRIENDS OR DOING THINGS OUTSIDE YOUR HOME?: NO

## 2024-11-15 ASSESSMENT — PAIN SCALES - GENERAL
PAINLEVEL_OUTOF10: 0 - NO PAIN
PAINLEVEL_OUTOF10: 0 - NO PAIN
PAINLEVEL_OUTOF10: 8
PAINLEVEL_OUTOF10: 2

## 2024-11-15 ASSESSMENT — COGNITIVE AND FUNCTIONAL STATUS - GENERAL
MOVING FROM LYING ON BACK TO SITTING ON SIDE OF FLAT BED WITH BEDRAILS: A LITTLE
CLIMB 3 TO 5 STEPS WITH RAILING: A LOT
DAILY ACTIVITIY SCORE: 18
DRESSING REGULAR UPPER BODY CLOTHING: A LITTLE
DRESSING REGULAR LOWER BODY CLOTHING: A LOT
DRESSING REGULAR UPPER BODY CLOTHING: A LITTLE
WALKING IN HOSPITAL ROOM: A LOT
WALKING IN HOSPITAL ROOM: A LOT
TOILETING: A LITTLE
MOBILITY SCORE: 15
CLIMB 3 TO 5 STEPS WITH RAILING: A LOT
MOVING TO AND FROM BED TO CHAIR: A LITTLE
TURNING FROM BACK TO SIDE WHILE IN FLAT BAD: A LITTLE
TURNING FROM BACK TO SIDE WHILE IN FLAT BAD: A LITTLE
STANDING UP FROM CHAIR USING ARMS: A LOT
MOVING FROM LYING ON BACK TO SITTING ON SIDE OF FLAT BED WITH BEDRAILS: A LITTLE
MOVING TO AND FROM BED TO CHAIR: A LITTLE
PERSONAL GROOMING: A LITTLE
WALKING IN HOSPITAL ROOM: A LOT
MOVING TO AND FROM BED TO CHAIR: A LITTLE
CLIMB 3 TO 5 STEPS WITH RAILING: A LITTLE
MOVING FROM LYING ON BACK TO SITTING ON SIDE OF FLAT BED WITH BEDRAILS: A LITTLE
TOILETING: A LITTLE
HELP NEEDED FOR BATHING: A LITTLE
MOVING TO AND FROM BED TO CHAIR: A LITTLE
TURNING FROM BACK TO SIDE WHILE IN FLAT BAD: A LITTLE
DRESSING REGULAR LOWER BODY CLOTHING: A LOT
TURNING FROM BACK TO SIDE WHILE IN FLAT BAD: A LITTLE
DAILY ACTIVITIY SCORE: 18
HELP NEEDED FOR BATHING: A LITTLE
PERSONAL GROOMING: A LITTLE
MOVING FROM LYING ON BACK TO SITTING ON SIDE OF FLAT BED WITH BEDRAILS: A LITTLE
CLIMB 3 TO 5 STEPS WITH RAILING: A LOT
PATIENT BASELINE BEDBOUND: NO
DAILY ACTIVITIY SCORE: 18
STANDING UP FROM CHAIR USING ARMS: A LOT
STANDING UP FROM CHAIR USING ARMS: A LOT
MOBILITY SCORE: 18
DRESSING REGULAR UPPER BODY CLOTHING: A LITTLE
HELP NEEDED FOR BATHING: A LITTLE
DRESSING REGULAR LOWER BODY CLOTHING: A LOT
MOBILITY SCORE: 15
WALKING IN HOSPITAL ROOM: A LITTLE
MOBILITY SCORE: 15
PERSONAL GROOMING: A LITTLE
STANDING UP FROM CHAIR USING ARMS: A LITTLE
TOILETING: A LITTLE

## 2024-11-15 ASSESSMENT — ENCOUNTER SYMPTOMS
CHILLS: 0
APPETITE CHANGE: 1
LIGHT-HEADEDNESS: 0
TROUBLE SWALLOWING: 0
HEMATURIA: 0
BACK PAIN: 1
COUGH: 0
CHEST TIGHTNESS: 0
FATIGUE: 1
CONFUSION: 0
ABDOMINAL PAIN: 0
COLOR CHANGE: 0
WOUND: 0
AGITATION: 0
DYSPHORIC MOOD: 0
HEADACHES: 0
DIARRHEA: 0
NERVOUS/ANXIOUS: 0
DIFFICULTY URINATING: 0
DYSURIA: 0
SORE THROAT: 0
FLANK PAIN: 0
PALPITATIONS: 0
SHORTNESS OF BREATH: 0
FREQUENCY: 0
CONSTIPATION: 0
WEAKNESS: 1
NAUSEA: 1
FEVER: 0
ABDOMINAL DISTENTION: 0
DIZZINESS: 0
JOINT SWELLING: 1

## 2024-11-15 ASSESSMENT — ACTIVITIES OF DAILY LIVING (ADL)
LACK_OF_TRANSPORTATION: NO
DRESSING YOURSELF: INDEPENDENT
BATHING: INDEPENDENT
GROOMING: INDEPENDENT
HEARING - LEFT EAR: FUNCTIONAL
HEARING - RIGHT EAR: FUNCTIONAL
FEEDING YOURSELF: INDEPENDENT
JUDGMENT_ADEQUATE_SAFELY_COMPLETE_DAILY_ACTIVITIES: YES
PATIENT'S MEMORY ADEQUATE TO SAFELY COMPLETE DAILY ACTIVITIES?: YES
WALKS IN HOME: INDEPENDENT
LACK_OF_TRANSPORTATION: NO
ADEQUATE_TO_COMPLETE_ADL: YES
TOILETING: INDEPENDENT

## 2024-11-15 ASSESSMENT — PATIENT HEALTH QUESTIONNAIRE - PHQ9
SUM OF ALL RESPONSES TO PHQ9 QUESTIONS 1 & 2: 0
1. LITTLE INTEREST OR PLEASURE IN DOING THINGS: NOT AT ALL
2. FEELING DOWN, DEPRESSED OR HOPELESS: NOT AT ALL

## 2024-11-15 ASSESSMENT — LIFESTYLE VARIABLES
PRESCIPTION_ABUSE_PAST_12_MONTHS: NO
HOW OFTEN DO YOU HAVE 6 OR MORE DRINKS ON ONE OCCASION: NEVER
SKIP TO QUESTIONS 9-10: 1
AUDIT-C TOTAL SCORE: 0
HOW OFTEN DO YOU HAVE A DRINK CONTAINING ALCOHOL: NEVER
SUBSTANCE_ABUSE_PAST_12_MONTHS: NO
AUDIT-C TOTAL SCORE: 0
HOW MANY STANDARD DRINKS CONTAINING ALCOHOL DO YOU HAVE ON A TYPICAL DAY: PATIENT DOES NOT DRINK

## 2024-11-15 ASSESSMENT — PAIN DESCRIPTION - DESCRIPTORS: DESCRIPTORS: ACHING;DISCOMFORT

## 2024-11-15 ASSESSMENT — PAIN - FUNCTIONAL ASSESSMENT
PAIN_FUNCTIONAL_ASSESSMENT: 0-10

## 2024-11-15 NOTE — PROGRESS NOTES
Occupational Therapy                 Therapy Communication Note    Patient Name: Josias Ramirez  MRN: [id number]  Department: [facility] 3 S OBS  Room: 3104/3104-A  Today's Date: 11/15/2024     Discipline: Occupational Therapy      Comment: OT orders received. Pt. Had just walked halls and got dressed with MOD I. Spoke to pt., declines need for skilled OT services. Will sign off.

## 2024-11-15 NOTE — H&P
"History Of Present Illness  Josias Ramirez is a 74 y.o. 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 on 11/14/2024 from home with complaints of back pain.    Patient states he had a fall earlier this week on 11/12 while he was attempting to ambulate at home. Per patient, he as using his cane to help him at the time and recalls \"slipping and twisting\" before he fell onto his right side. He states he did not hit his head or lose consciousness at the time and was able to get up with the help of a family member. He denies recent anticoagulant or NSAID use. He endorses having worsening mid-lower back pain since the fall. He currently rates his back pain 3/10 and states it is much better since receiving pain medications in the ER. He also tells me that he has been having bilateral ankle swelling, although mentions that it has been chronic. Per patient right ankle/pedal edema is worse than usual and he has been having worsening pain in the right ankle after the fall. He also mentions that he has had decreased appetite over the last 3 weeks, but is able to tolerate fluid intake so he has been drink Boost, orange juice, broth, soup and water. He says he does not have much desire for solid foods. Additionally reports unintentional weight loss over the past month, but is unable tell how much it is exactly. He denies any recent fever, chills, headache, dizziness, chest pain / palpitations, shortness of breath, cough, abdominal pain, vomiting, diarrhea, dysuria, or hematuria.    Of " note, patient has extensive SCC of the nose with metastasis to right parotid and right neck region, and adenocarcinoma of left lung. Per documentation, there is concern for locally advanced and/or metastatic disease. He is currently on chemoradiation and follows with ENT, Heme-onc/rad-onc at [facility].     ED Course:  Vital signs: Temp. 97.5 F, HR 85 bpm, RR 20, /49, SPO2 97% on room air   CMP: Glucose 144, anion gap 22, BUN 27, creat 1.44, EGFR 51, calcium 8.4, albumin 3.2, total protein 5.4  BNP: 120  Troponin: 16  CBC w/diff: WBC 6.3, H&H 9.4 and 29.6 respectively, MCHC 31.8, RDW 15.8  UA ordered, pending collection  CT cervical spine: No evidence of acute fracture of the cervical spine.  Multilevel degenerative change of the cervical spine with extensive ossification of posterior longitudinal ligament resulting in multilevel spinal canal stenosis.  CT head: No evidence of acute intracranial hemorrhage or depressed calvarial fracture.  Cerebral atrophy and chronic periventricular white matter small vessel ischemic change.  Irregular and apparent defect in left anterior nasal soft tissues.  CT chest abdomen pelvis, lumbar spine, and thoracic spine: No evidence of acute thoracic, abdominal, or pelvic trauma.  Spiculated 1.4 cm left upper lobe pulmonary nodule.  4 mm right apical pulmonary nodule.  7 mm enhancing focus in the spleen, likely hemangioma but incompletely characterized.  Mild wall thickening of the gastric antrum which may indicate mild gastritis.  Medications Given: 0.9% sodium chloride 1 L IV bolus, Dilaudid 0.5 mg IV x 1  Disposition: Patient admitted to [facility] for generalized weakness and back pain s/p fall 2 days ago.    Past Medical History  Past Medical History:   Diagnosis Date    Adenocarcinoma of left lung (Multi)     Cancer (Multi)     Cataracts, bilateral     Chronic anemia     Mixed hyperlipidemia     Nonmelanoma skin cancer     Of face, head, right forearm    Pedal edema     Peptic  ulcer disease     Squamous cell carcinoma of nose     Tobacco use disorder     Type 2 diabetes mellitus     Vitamin B12 deficiency       Surgical History  Past Surgical History:   Procedure Laterality Date    OTHER SURGICAL HISTORY      Multiple Mohs procedures    PILONIDAL CYST DRAINAGE      SINUS SURGERY      SKIN BIOPSY       Social History  He reports that he has been smoking cigarettes. He has never used smokeless tobacco. He reports that he does not drink alcohol and does not use drugs.    Family History  Family History   Problem Relation Name Age of Onset    Other (Diabetes mellitus) Mother      Other (Diabetes mellitus) Sister      Other (Liver transplant) Brother       Allergies  Penicillins and Tramadol    Review of Systems   Constitutional:  Positive for appetite change and fatigue. Negative for chills and fever.   HENT:  Positive for congestion. Negative for hearing loss, sore throat and trouble swallowing.    Eyes:  Negative for visual disturbance.   Respiratory:  Negative for cough, chest tightness and shortness of breath.    Cardiovascular:  Positive for leg swelling. Negative for chest pain and palpitations.   Gastrointestinal:  Positive for nausea. Negative for abdominal distention, abdominal pain, constipation and diarrhea.   Genitourinary:  Negative for difficulty urinating, dysuria, flank pain, frequency, hematuria and urgency.   Musculoskeletal:  Positive for back pain (Mid back region per patient report) and joint swelling (bilateral ankles, chronic per patient). Negative for gait problem.   Skin:  Negative for color change, rash and wound.   Neurological:  Positive for weakness. Negative for dizziness, syncope, light-headedness and headaches.   Psychiatric/Behavioral:  Negative for agitation, confusion and dysphoric mood. The patient is not nervous/anxious.       Physical Exam  Constitutional:       General: He is not in acute distress.     Appearance: He is cachectic. He is ill-appearing.    HENT:      Head: Normocephalic and atraumatic.      Jaw: No tenderness or swelling.      Comments: Ecchymosis noted on right cheek     Right Ear: External ear normal.      Left Ear: External ear normal.      Nose: Congestion present.      Comments: Deformity related to squamous cell carcinoma, scabbing. Dry dressing in place, clean/dry/intact.     Mouth/Throat:      Mouth: Mucous membranes are dry.      Pharynx: Oropharynx is clear.   Eyes:      General: Lids are normal.      Pupils: Pupils are equal, round, and reactive to light.   Cardiovascular:      Rate and Rhythm: Normal rate and regular rhythm.      Pulses:           Radial pulses are 2+ on the right side and 2+ on the left side.        Dorsalis pedis pulses are 1+ on the right side and 1+ on the left side.        Posterior tibial pulses are 1+ on the right side and 1+ on the left side.      Heart sounds: Normal heart sounds, S1 normal and S2 normal. No murmur heard.  Pulmonary:      Effort: Pulmonary effort is normal. No respiratory distress.      Breath sounds: Normal breath sounds. No wheezing, rhonchi or rales.   Abdominal:      General: Abdomen is flat. Bowel sounds are normal. There is no distension.      Tenderness: There is no abdominal tenderness.   Musculoskeletal:      Cervical back: Normal. No swelling, tenderness or bony tenderness.      Thoracic back: Normal. No swelling, tenderness or bony tenderness.      Lumbar back: Normal. No swelling, tenderness or bony tenderness.      Right lower leg: 3+ Edema present.      Left lower leg: 3+ Edema present.   Skin:     General: Skin is cool and dry.      Capillary Refill: Capillary refill takes less than 2 seconds.      Findings: Ecchymosis (Right cheek, right forearm and BLE) present.      Comments: Weeping of clear fluid from bilateral lower extremities     Dry/scaling skin of bilateral feet   Skin tenting present   Neurological:      General: No focal deficit present.      Mental Status: He is alert  "and oriented to person, place, and time.      Sensory: Sensation is intact.      Motor: Weakness (generalized) present.      Comments: Equal bilateral hand grasps and foot dorsi/plantar flexion   Psychiatric:         Attention and Perception: Attention normal.         Mood and Affect: Mood and affect normal.         Speech: Speech normal.         Behavior: Behavior normal. Behavior is cooperative.         Thought Content: Thought content normal.       Last Recorded Vitals  Blood pressure 147/64, pulse 78, temperature 36.4 °C (97.5 °F), temperature source Temporal, resp. rate 19, height 1.803 m (5' 11\"), weight 61.2 kg (135 lb), SpO2 100%.  Visit Vitals  /64   Pulse 78   Temp 36.4 °C (97.5 °F) (Temporal)   Resp 19   Ht 1.803 m (5' 11\")   Wt 61.2 kg (135 lb)   SpO2 100%   BMI 18.83 kg/m²   Smoking Status Every Day   BSA 1.75 m²     Weight: 61.2 kg (135 lb)   Pain Assessment: 0-10  0-10 (Numeric) Pain Score: 0 - No pain  Pain Location: Other (Comment) (legs, nose, back)    Relevant Results  Results for orders placed or performed during the hospital encounter of 11/14/24 (from the past 24 hours)   CBC and Auto Differential   Result Value Ref Range    WBC 6.3 4.4 - 11.3 x10*3/uL    nRBC 0.0 0.0 - 0.0 /100 WBCs    RBC 3.34 (L) 4.50 - 5.90 x10*6/uL    Hemoglobin 9.4 (L) 13.5 - 17.5 g/dL    Hematocrit 29.6 (L) 41.0 - 52.0 %    MCV 89 80 - 100 fL    MCH 28.1 26.0 - 34.0 pg    MCHC 31.8 (L) 32.0 - 36.0 g/dL    RDW 15.8 (H) 11.5 - 14.5 %    Platelets 162 150 - 450 x10*3/uL    Neutrophils % 72.4 40.0 - 80.0 %    Immature Granulocytes %, Automated 1.9 (H) 0.0 - 0.9 %    Lymphocytes % 17.5 13.0 - 44.0 %    Monocytes % 7.5 2.0 - 10.0 %    Eosinophils % 0.2 0.0 - 6.0 %    Basophils % 0.5 0.0 - 2.0 %    Neutrophils Absolute 4.56 1.60 - 5.50 x10*3/uL    Immature Granulocytes Absolute, Automated 0.12 0.00 - 0.50 x10*3/uL    Lymphocytes Absolute 1.10 0.80 - 3.00 x10*3/uL    Monocytes Absolute 0.47 0.05 - 0.80 x10*3/uL    " Eosinophils Absolute 0.01 0.00 - 0.40 x10*3/uL    Basophils Absolute 0.03 0.00 - 0.10 x10*3/uL   Comprehensive metabolic panel   Result Value Ref Range    Glucose 144 (H) 74 - 99 mg/dL    Sodium 136 136 - 145 mmol/L    Potassium 4.6 3.5 - 5.3 mmol/L    Chloride 98 98 - 107 mmol/L    Bicarbonate 21 21 - 32 mmol/L    Anion Gap 22 (H) 10 - 20 mmol/L    Urea Nitrogen 27 (H) 6 - 23 mg/dL    Creatinine 1.44 (H) 0.50 - 1.30 mg/dL    eGFR 51 (L) >60 mL/min/1.73m*2    Calcium 8.4 (L) 8.6 - 10.3 mg/dL    Albumin 3.2 (L) 3.4 - 5.0 g/dL    Alkaline Phosphatase 73 33 - 136 U/L    Total Protein 5.4 (L) 6.4 - 8.2 g/dL    AST 25 9 - 39 U/L    Bilirubin, Total 0.5 0.0 - 1.2 mg/dL    ALT 20 10 - 52 U/L   Troponin I, High Sensitivity   Result Value Ref Range    Troponin I, High Sensitivity 16 0 - 20 ng/L   B-type natriuretic peptide   Result Value Ref Range     (H) 0 - 99 pg/mL      XR ankle left 3+ views    Result Date: 11/15/2024  STUDY: Ankle Radiographs;  11/15/2024  1:18AM INDICATION: Left ankle pain and swelling. COMPARISON: None Available. ACCESSION NUMBER(S): [id number] ORDERING CLINICIAN: NIKKI MARCELINO TECHNIQUE:  Three view(s) of the left ankle. FINDINGS:  There is no displaced fracture.  The alignment is anatomic. Moderate-sized Achilles enthesophyte is noted.  There is extensive soft tissue swelling surrounding the ankle.    No acute osseous abnormality identified. Extensive soft tissue swelling surrounding the ankle. Signed by Oz Dial    CT cervical spine wo IV contrast    Result Date: 11/14/2024  Interpreted By:  Kameron Quiros, STUDY: CT CERVICAL SPINE WO IV CONTRAST;  11/14/2024 10:58 pm   INDICATION: Signs/Symptoms:Fall.   COMPARISON: None.   ACCESSION NUMBER(S): [id number]   ORDERING CLINICIAN: ALISE ESCALANTE   TECHNIQUE: Contiguous axial images of the cervical spine were obtained without intravenous contrast. Coronal and sagittal reformatted images were obtained from the axial images.   FINDINGS: No  acute fracture of the cervical spine. There is multilevel degenerative change of the cervical spine. There is multilevel intervertebral disc space narrowing and degenerative disc disease and multilevel anterior osseous spurring. There is extensive multilevel ossification of posterior longitudinal ligament resulting in multilevel spinal canal stenosis. There is also degenerative facet and uncovertebral arthropathy. No significant prevertebral soft tissue edema.       No evidence of acute fracture of the cervical spine.   Multilevel degenerative change of the cervical spine with extensive ossification of the posterior longitudinal ligament resulting in multilevel spinal canal stenosis.   MACRO: None   Signed by: Kameron Quiros 11/14/2024 11:55 PM Dictation workstation:   [id number]    CT head wo IV contrast    Result Date: 11/14/2024  Interpreted By:  Kameron Quiros, STUDY: CT HEAD WO IV CONTRAST;  11/14/2024 10:58 pm   INDICATION: Signs/Symptoms:Fall, injury.   COMPARISON: None   ACCESSION NUMBER(S): [id number]   ORDERING CLINICIAN: ALISE ESCALANTE   TECHNIQUE: Contiguous axial images of the head were obtained without intravenous contrast.   FINDINGS: BRAIN PARENCHYMA:  There is cerebral atrophy and chronic periventricular white matter small vessel ischemic change. The gray white matter differentiation is preserved.  No mass effect or midline shift.   HEMORRHAGE:  No evidence of acute intracranial hemorrhage. VENTRICLES AND EXTRA-AXIAL SPACES:  The ventricles are within normal limits in size for brain volume.  No evidence of abnormal extraaxial fluid collection. EXTRACRANIAL SOFT TISSUES:  Within normal limits. PARANASAL SINUSES/MASTOIDS:  Mild mucosal thickening of the maxillary sinus. CALVARIUM:  No evidence of depressed calvarial fracture.   OTHER FINDINGS:  Irregularity and apparent defect in the left anterior nasal soft tissues.       No evidence of acute intracranial hemorrhage or depressed calvarial fracture.    Cerebral atrophy and chronic periventricular white matter small vessel ischemic change.   Irregularity and apparent defect in the left anterior nasal soft tissues; clinical correlation with physical examination recommended..   MACRO: None   Signed by: Kameron Quiros 11/14/2024 11:53 PM Dictation workstation:   [id number]    CT lumbar spine wo IV contrast    Result Date: 11/14/2024  STUDY: CT Chest, Abdomen, and Pelvis with IV Contrast, CT Thoracic Spine and Lumbar Spine without IV Contrast; 11/14/2024 11:12 PM INDICATION: Pain status post fall. COMPARISON: XR chest 11/03/2024. ACCESSION NUMBER(S): [id number], [id number], [id number] ORDERING CLINICIAN: EDER SAENZ TECHNIQUE: CT of the chest, abdomen, and pelvis was performed.  Contiguous axial images were obtained at 3 mm slice thickness through the chest, abdomen, and pelvis.  Coronal and sagittal reconstructions at 3 mm slice thickness were performed.  100 mL Omnipaque-350 was administered intravenously. FINDINGS: CHEST: MEDIASTINUM: The heart is normal in size without pericardial effusion.  Mild calcified atheromatous plaque is seen in the thoracic aorta.  Mild to moderate calcified coronary plaque is noted.  LUNGS/PLEURA: There is no pleural effusion, pleural thickening, or pneumothorax. The airways are patent. There is an irregularly-shaped spiculated nodule in the left upper lobe measuring up 1.4 cm in diameter on image 45 of series 204.  4 mm nodule is seen in the medial aspect of the right apex on image 18 of series 201.  LYMPH NODES: Thoracic lymph nodes are not enlarged. ABDOMEN:  LIVER: No hepatomegaly.  Smooth surface contour.  Normal attenuation.  BILE DUCTS: No intrahepatic or extrahepatic biliary ductal dilatation.  GALLBLADDER: Gallbladder is unremarkable.  STOMACH: There is question of mild wall thickening of the gastric antrum.   PANCREAS: Moderate pancreatic atrophy is noted.  SPLEEN: There is a focus of arterial enhancement within the  spleen measuring 7 mm, likely hemangioma.   ADRENAL GLANDS: No thickening or nodules.  KIDNEYS AND URETERS: Mild perinephric stranding is noted bilaterally.  No renal or ureteral calculi.  PELVIS:  BLADDER: No abnormalities identified.  REPRODUCTIVE ORGANS: No abnormalities identified.  BOWEL: No abnormalities identified.  Appendix appears normal.  Terminal ileum is unremarkable.  VESSELS: There is mild to moderate calcified atheromatous plaque in the abdominal aorta and iliac arteries.   PERITONEUM/RETROPERITONEUM/LYMPH NODES: No free fluid.  No pneumoperitoneum. No lymphadenopathy.  ABDOMINAL WALL: No abnormalities identified. SOFT TISSUES: No abnormalities identified.  BONES: No acute fracture or aggressive osseous lesion. THORACIC SPINE: Slightly pronounced thoracic kyphosis is noted with mild to moderate osteophyte formation throughout the thoracic spine as well as the mid and lower lumbar spine.   There is no fracture or traumatic subluxation. Mild multilevel disc space narrowing is present in the mid thoracic spine.  No significant central canal stenosis is demonstrated.  The neural foramina are patent throughout.  The paravertebral soft tissues are within normal limits. LUMBAR SPINE: There is a minimal dextroconvex curvature of the lower thoracic and lumbar spine.  There is no fracture or traumatic subluxation. The vertebral body heights are well maintained.  Disc spaces are preserved.  No significant central canal stenosis is demonstrated. The neural foramina are patent throughout.  The paravertebral soft tissues are within normal limits.    No evidence of acute thoracic, abdominal, or pelvic trauma identified. Spiculated 1.4 cm left upper lobe pulmonary nodule, suspicious for possible malignancy.  Further evaluation is recommended with PET/CT. 4 mm right apical pulmonary nodule. 7 mm enhancing focus in the spleen, likely hemangioma but incompletely characterized on this exam. Mild wall thickening of the  gastric antrum which may indicate mild gastritis in the appropriate clinical setting. Signed by Oz Dial    CT thoracic spine wo IV contrast    Result Date: 11/14/2024  STUDY: CT Chest, Abdomen, and Pelvis with IV Contrast, CT Thoracic Spine and Lumbar Spine without IV Contrast; 11/14/2024 11:12 PM INDICATION: Pain status post fall. COMPARISON: XR chest 11/03/2024. ACCESSION NUMBER(S): [id number], [id number], [id number] ORDERING CLINICIAN: EDER SAENZ TECHNIQUE: CT of the chest, abdomen, and pelvis was performed.  Contiguous axial images were obtained at 3 mm slice thickness through the chest, abdomen, and pelvis.  Coronal and sagittal reconstructions at 3 mm slice thickness were performed.  100 mL Omnipaque-350 was administered intravenously. FINDINGS: CHEST: MEDIASTINUM: The heart is normal in size without pericardial effusion.  Mild calcified atheromatous plaque is seen in the thoracic aorta.  Mild to moderate calcified coronary plaque is noted.  LUNGS/PLEURA: There is no pleural effusion, pleural thickening, or pneumothorax. The airways are patent. There is an irregularly-shaped spiculated nodule in the left upper lobe measuring up 1.4 cm in diameter on image 45 of series 204.  4 mm nodule is seen in the medial aspect of the right apex on image 18 of series 201.  LYMPH NODES: Thoracic lymph nodes are not enlarged. ABDOMEN:  LIVER: No hepatomegaly.  Smooth surface contour.  Normal attenuation.  BILE DUCTS: No intrahepatic or extrahepatic biliary ductal dilatation.  GALLBLADDER: Gallbladder is unremarkable.  STOMACH: There is question of mild wall thickening of the gastric antrum.   PANCREAS: Moderate pancreatic atrophy is noted.  SPLEEN: There is a focus of arterial enhancement within the spleen measuring 7 mm, likely hemangioma.   ADRENAL GLANDS: No thickening or nodules.  KIDNEYS AND URETERS: Mild perinephric stranding is noted bilaterally.  No renal or ureteral calculi.  PELVIS:  BLADDER: No  abnormalities identified.  REPRODUCTIVE ORGANS: No abnormalities identified.  BOWEL: No abnormalities identified.  Appendix appears normal.  Terminal ileum is unremarkable.  VESSELS: There is mild to moderate calcified atheromatous plaque in the abdominal aorta and iliac arteries.   PERITONEUM/RETROPERITONEUM/LYMPH NODES: No free fluid.  No pneumoperitoneum. No lymphadenopathy.  ABDOMINAL WALL: No abnormalities identified. SOFT TISSUES: No abnormalities identified.  BONES: No acute fracture or aggressive osseous lesion. THORACIC SPINE: Slightly pronounced thoracic kyphosis is noted with mild to moderate osteophyte formation throughout the thoracic spine as well as the mid and lower lumbar spine.   There is no fracture or traumatic subluxation. Mild multilevel disc space narrowing is present in the mid thoracic spine.  No significant central canal stenosis is demonstrated.  The neural foramina are patent throughout.  The paravertebral soft tissues are within normal limits. LUMBAR SPINE: There is a minimal dextroconvex curvature of the lower thoracic and lumbar spine.  There is no fracture or traumatic subluxation. The vertebral body heights are well maintained.  Disc spaces are preserved.  No significant central canal stenosis is demonstrated. The neural foramina are patent throughout.  The paravertebral soft tissues are within normal limits.    No evidence of acute thoracic, abdominal, or pelvic trauma identified. Spiculated 1.4 cm left upper lobe pulmonary nodule, suspicious for possible malignancy.  Further evaluation is recommended with PET/CT. 4 mm right apical pulmonary nodule. 7 mm enhancing focus in the spleen, likely hemangioma but incompletely characterized on this exam. Mild wall thickening of the gastric antrum which may indicate mild gastritis in the appropriate clinical setting. Signed by Oz Dial    CT chest abdomen pelvis w IV contrast    Result Date: 11/14/2024  STUDY: CT Chest, Abdomen, and  Pelvis with IV Contrast, CT Thoracic Spine and Lumbar Spine without IV Contrast; 11/14/2024 11:12 PM INDICATION: Pain status post fall. COMPARISON: XR chest 11/03/2024. ACCESSION NUMBER(S): [id number], [id number], [id number] ORDERING CLINICIAN: EDER SAENZ TECHNIQUE: CT of the chest, abdomen, and pelvis was performed.  Contiguous axial images were obtained at 3 mm slice thickness through the chest, abdomen, and pelvis.  Coronal and sagittal reconstructions at 3 mm slice thickness were performed.  100 mL Omnipaque-350 was administered intravenously. FINDINGS: CHEST: MEDIASTINUM: The heart is normal in size without pericardial effusion.  Mild calcified atheromatous plaque is seen in the thoracic aorta.  Mild to moderate calcified coronary plaque is noted.  LUNGS/PLEURA: There is no pleural effusion, pleural thickening, or pneumothorax. The airways are patent. There is an irregularly-shaped spiculated nodule in the left upper lobe measuring up 1.4 cm in diameter on image 45 of series 204.  4 mm nodule is seen in the medial aspect of the right apex on image 18 of series 201.  LYMPH NODES: Thoracic lymph nodes are not enlarged. ABDOMEN:  LIVER: No hepatomegaly.  Smooth surface contour.  Normal attenuation.  BILE DUCTS: No intrahepatic or extrahepatic biliary ductal dilatation.  GALLBLADDER: Gallbladder is unremarkable.  STOMACH: There is question of mild wall thickening of the gastric antrum.   PANCREAS: Moderate pancreatic atrophy is noted.  SPLEEN: There is a focus of arterial enhancement within the spleen measuring 7 mm, likely hemangioma.   ADRENAL GLANDS: No thickening or nodules.  KIDNEYS AND URETERS: Mild perinephric stranding is noted bilaterally.  No renal or ureteral calculi.  PELVIS:  BLADDER: No abnormalities identified.  REPRODUCTIVE ORGANS: No abnormalities identified.  BOWEL: No abnormalities identified.  Appendix appears normal.  Terminal ileum is unremarkable.  VESSELS: There is mild to  moderate calcified atheromatous plaque in the abdominal aorta and iliac arteries.   PERITONEUM/RETROPERITONEUM/LYMPH NODES: No free fluid.  No pneumoperitoneum. No lymphadenopathy.  ABDOMINAL WALL: No abnormalities identified. SOFT TISSUES: No abnormalities identified.  BONES: No acute fracture or aggressive osseous lesion. THORACIC SPINE: Slightly pronounced thoracic kyphosis is noted with mild to moderate osteophyte formation throughout the thoracic spine as well as the mid and lower lumbar spine.   There is no fracture or traumatic subluxation. Mild multilevel disc space narrowing is present in the mid thoracic spine.  No significant central canal stenosis is demonstrated.  The neural foramina are patent throughout.  The paravertebral soft tissues are within normal limits. LUMBAR SPINE: There is a minimal dextroconvex curvature of the lower thoracic and lumbar spine.  There is no fracture or traumatic subluxation. The vertebral body heights are well maintained.  Disc spaces are preserved.  No significant central canal stenosis is demonstrated. The neural foramina are patent throughout.  The paravertebral soft tissues are within normal limits.    No evidence of acute thoracic, abdominal, or pelvic trauma identified. Spiculated 1.4 cm left upper lobe pulmonary nodule, suspicious for possible malignancy.  Further evaluation is recommended with PET/CT. 4 mm right apical pulmonary nodule. 7 mm enhancing focus in the spleen, likely hemangioma but incompletely characterized on this exam. Mild wall thickening of the gastric antrum which may indicate mild gastritis in the appropriate clinical setting. Signed by Oz Dial        Home Medications  Prior to Admission medications    Not on File       Medications  Scheduled medications  heparin (porcine), 5,000 Units, subcutaneous, q8h  insulin lispro, 0-10 Units, subcutaneous, Before meals & nightly  lidocaine, 2 patch, transdermal, Daily  nicotine, 1 patch, transdermal,  Daily  oral hydration, 400 mL, oral, q4h LASHELL      Continuous medications     PRN medications  PRN medications: acetaminophen **OR** acetaminophen **OR** acetaminophen, dextrose, dextrose, glucagon, glucagon, melatonin, polyethylene glycol        Assessment/Plan   Assessment & Plan  Fall, initial encounter    LAVINIA (acute kidney injury) (CMS-HCC)    Severe protein-calorie malnutrition (Multi)    Tobacco use disorder    Adenocarcinoma of left lung (Multi)    Type 2 diabetes mellitus    Squamous cell carcinoma of nose    Chronic anemia      Plan:  Code Status: Full Code. Code status discussion completed with patient who wishes for all resuscitative measures to be taken at this time. He voiced understanding of chosen CODE STATUS.  Consult: Palliative for goals of care discussion. Nutrition for moderate to severe protein-calorie malnutrition. Any other consults are deferred to the attending provider per request. Can consider heme-onc consult, however patient is already established with heme onc/rad[facility] at [facility].  If LAVINIA does not resolve or continues to worsen, can consider nephrology consult as well.  ATB: Not indicated at this time.   IVFs: Patient received 1L of 0.9% sodium chloride in the ER for LAVINIA and dehydration. He is currently able to tolerate PO fluid intake, hence promoting oral rehydration therapy.   Meds: Lidocaine 4% patch x 2 to thoracic/lumbar spine area, nicotine 14 mg patch daily, sliding scale insulin, Tylenol 650 mg p.o. every 6 hours as needed for pain 1-6/headaches, melatonin 3 milligrams p.o. daily as needed for insomnia, MiraLAX 17 gms p.o daily as needed for constipation.  Avoid nephrotoxic medications   Hypoglycemia protocol  Monitor for hypo/hyperglycemia signs and symptoms   Diet: Cardiac, Carb Consistent  Telemetry monitoring   Vital signs with BP, HR, & RR Q 4 hours.  Pulse ox Q 4 hours.   Admission height and weight.  Labs ordered: CBC, BMP, Mg, Phos. Iron and TIBC, ferritin.  UA with culture ordered in ER, pending collection.  Monitor / trend labs while inpatient.  Monitor and replace electrolytes as needed.  Transfuse with PRBC for hemoglobin<7.0   Test ordered: XR left ankle.  Rest deferred to attending and consults.  Additional orders:  Strict I&Os   Aspiration precautions.  Fall precautions   Out of bed with assistance   PT/OT eval and treat as indicated  Encourage & educate on smoking cessation   Call physician for temperature < 36.5 or >38.0 degrees celsius.  Call physician for pulse <50 or >120.  Call physician for respiratory rate <10 or >22.  Call physician for systolic blood pressure <90 or >170.  Call physician for diastolic blood pressure < 50 or >100.  Call physician for pulse ox <92%.    VTE prophylaxis:   Subcutaneous heparin  BLE SCDs.  Monitor for s/s of bleeding    Medication reconciliation to be completed when nursing/pharmacy  are able to verify patient's accurate home medications.    See additional orders for further plan of care. Any further evaluation and management per attending and consulting physicians.      This note has been transcribed using Dragon voice recognition system and there is a possibility of unintentional typing misprints.  Any information found to be copied from previous providers is done in the best interest of the patient to provide accurate, quality, and continuity of care.     I spent 45 minutes in the professional and overall care of this patient.      [facility]  Med. [place]

## 2024-11-15 NOTE — PROGRESS NOTES
11/15/24 1120   Discharge Planning   Living Arrangements Parent;Family members   Support Systems Parent;Family members   Type of Residence Private residence   Number of Stairs to Enter Residence 0   Number of Stairs Within Residence 0   Home or Post Acute Services None   Expected Discharge Disposition Home   Does the patient need discharge transport arranged? No   Financial Resource Strain   How hard is it for you to pay for the very basics like food, housing, medical care, and heating? Not hard     Met with pt to review his dc plan. The pt states he lives at home with his sister and 100 year old mother. His sister assists with care needs for both of them. The pt no longer drives, his sister provides transport. The pt owns a cane and walker but states he generally uses the walker. States he did not have a true fall but rather he tripped, causing the fall. The pt states his plan is to return home at time of dc and does not think HHC is necessary. Therapy evals are pending. Has a ride home upon dc. PCP is Dr. Adams and prescriptions are filled at [facility] with no barriers noted. Awaiting therapy evals for dispo plan.     1430 Met with pt to review therapy evals. Ampac is 18 and recommendation is for low intensity therapy. Pt again states he does not want any HHC. Upon leaving the pt's room his sister (whom he lives with) arrived and states she would like the pt to have HHC. This TCC advised her to speak with the pt as he needs to be agreeable.

## 2024-11-15 NOTE — CARE PLAN
The patient's goals for the shift include sleep and comfort    The clinical goals for the shift include remain free of falls and injury    Problem: Pain - Adult  Goal: Verbalizes/displays adequate comfort level or baseline comfort level  Outcome: Progressing     Problem: Safety - Adult  Goal: Free from fall injury  Outcome: Progressing     Problem: Discharge Planning  Goal: Discharge to home or other facility with appropriate resources  Outcome: Progressing     Problem: Chronic Conditions and Co-morbidities  Goal: Patient's chronic conditions and co-morbidity symptoms are monitored and maintained or improved  Outcome: Progressing     Problem: Skin  Goal: Decreased wound size/increased tissue granulation at next dressing change  Outcome: Progressing  Goal: Participates in plan/prevention/treatment measures  Outcome: Progressing  Goal: Prevent/manage excess moisture  Outcome: Progressing  Goal: Prevent/minimize sheer/friction injuries  Outcome: Progressing  Goal: Promote/optimize nutrition  Outcome: Progressing  Goal: Promote skin healing  Outcome: Progressing     Problem: Fall/Injury  Goal: Not fall by end of shift  Outcome: Progressing  Goal: Be free from injury by end of the shift  Outcome: Progressing  Goal: Verbalize understanding of personal risk factors for fall in the hospital  Outcome: Progressing  Goal: Verbalize understanding of risk factor reduction measures to prevent injury from fall in the home  Outcome: Progressing  Goal: Use assistive devices by end of the shift  Outcome: Progressing  Goal: Pace activities to prevent fatigue by end of the shift  Outcome: Progressing     Problem: Pain  Goal: Takes deep breaths with improved pain control throughout the shift  Outcome: Progressing  Goal: Turns in bed with improved pain control throughout the shift  Outcome: Progressing  Goal: Walks with improved pain control throughout the shift  Outcome: Progressing  Goal: Performs ADL's with improved pain control  throughout shift  Outcome: Progressing  Goal: Participates in PT with improved pain control throughout the shift  Outcome: Progressing  Goal: Free from opioid side effects throughout the shift  Outcome: Progressing  Goal: Free from acute confusion related to pain meds throughout the shift  Outcome: Progressing

## 2024-11-15 NOTE — PROGRESS NOTES
Physical Therapy    Physical Therapy Evaluation    Patient Name: Josias Ramirez  MRN: [id number]  Today's Date: 11/15/2024   Time Calculation  Start Time: 1056  Stop Time: 1127  Time Calculation (min): 31 min  3104/3104-A    Assessment/Plan   PT Assessment: Pt demonstrates decreased strength and impaired balance/mobility.  Pt appears below baseline level of function and based on current level of function, pt would benefit from continued skilled therapy while in the hospital to ensure safety, decrease risk of falls, and regain strength/mobility back to baseline.  Once stable enough for discharge, pt would benefit from low intensity therapy.     PT Assessment Results: Decreased strength, Decreased mobility  Rehab Prognosis: Good  Evaluation/Treatment Tolerance: Patient tolerated treatment well  Medical Staff Made Aware: Yes  End of Session Communication: Bedside nurse  End of Session Patient Position: Up in chair, Alarm on  IP OR SWING BED PT PLAN  Inpatient or Swing Bed: Inpatient  PT Plan  Treatment/Interventions: Bed mobility, Transfer training, Gait training, Balance training, Neuromuscular re-education, Strengthening, Range of motion, Therapeutic exercise, Therapeutic activity, Home exercise program  PT Plan: Ongoing PT  PT Frequency: 3 times per week  PT Discharge Recommendations: Low intensity level of continued care  Equipment Recommended upon Discharge: Wheeled walker  PT Recommended Transfer Status: Stand by assist, Contact guard  PT - OK to Discharge: Yes - To next level of care when cleared by medical team    Subjective     Current Problem:  1. Fall, initial encounter        2. Weakness        3. Severe protein-calorie malnutrition (Multi)            Past Medical History:  Patient Active Problem List   Diagnosis    Fall, initial encounter    LAVINIA (acute kidney injury) (CMS-HCC)    Severe protein-calorie malnutrition (Multi)    Tobacco use disorder    Adenocarcinoma of left lung (Multi)    Type 2 diabetes  "mellitus    Squamous cell carcinoma of nose    Chronic anemia       General Visit Information:  Per EMR: pt presents to [facility] [place] on 11/14/2024 from home with complaints of back pain.     Patient states he had a fall earlier this week on 11/12 while he was attempting to ambulate at home. Per patient, he as using his cane to help him at the time and recalls \"slipping and twisting\" before he fell onto his right side. He states he did not hit his head or lose consciousness at the time and was able to get up with the help of a family member. He denies recent anticoagulant or NSAID use. He endorses having worsening mid-lower back pain since the fall. He currently rates his back pain 3/10 and states it is much better since receiving pain medications in the ER. He also tells me that he has been having bilateral ankle swelling, although mentions that it has been chronic. Per patient right ankle/pedal edema is worse than usual and he has been having worsening pain in the right ankle after the fall. He also mentions that he has had decreased appetite over the last 3 weeks, but is able to tolerate fluid intake so he has been drink Boost, orange juice, broth, soup and water. He says he does not have much desire for solid foods. Additionally reports unintentional weight loss over the past month, but is unable tell how much it is exactly. He denies any recent fever, chills, headache, dizziness, chest pain / palpitations, shortness of breath, cough, abdominal pain, vomiting, diarrhea, dysuria, or hematuria.     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 Per documentation, there is concern for locally advanced and/or metastatic disease. He is currently on chemoradiation and follows with ENT, Heme-onc/rad[facility]onc at [facility].     On arrival, pt supine in bed.  Pt in no apparent distress and agreeable to therapy.    General  Reason for Referral: " impaired mobility  Referred By: RAMILA Gordon  Prior to Session Communication: Bedside nurse  Patient Position Received: Bed, 3 rail up, Alarm on    Home Living/PLOF:  Pt lives in [facility] with his mother (100 y.o.) and his sister.  0 TORI and 1 floor set up.  IND with ADLs.  Has WIS with chair, and Gbs.  Mod I for mobility using FWW.  Pt's sister drives and shops.  Reports x2 recent falls.    Precautions:  Precautions  Medical Precautions: Fall precautions     Objective     Pain:  Pain Assessment  Pain Assessment: 0-10  0-10 (Numeric) Pain Score: 0 - No pain    Cognition:  Cognition  Overall Cognitive Status: Within Functional Limits  Orientation Level: Oriented X4    General Assessments:      Activity Tolerance  Endurance: Endurance does not limit participation in activity  Sensation  Sensation Comment: pt denies any numbness/tingling    Static Sitting Balance  Static Sitting-Comment/Number of Minutes: good  Dynamic Sitting Balance  Dynamic Sitting-Comments: good  Static Standing Balance  Static Standing-Comment/Number of Minutes: good  Dynamic Standing Balance  Dynamic Standing-Comments: fair plus    Extremity/Trunk Assessments:  BLE strength: WFL    Functional Mobility:  Bed mobility  Supine to sit: SUP    Transfers  Sit to stand: SUP; able to pull up pants in standing   Stand to sit: SUP    Ambulation/Stairs  Pt ambulated 250 ft with SBA and FWW; NBOS but appears steady; no LOB noted     Outcome Measures:  [facility] Basic Mobility  Turning from your back to your side while in a flat bed without using bedrails: A little  Moving from lying on your back to sitting on the side of a flat bed without using bedrails: A little  Moving to and from bed to chair (including a wheelchair): A little  Standing up from a chair using your arms (e.g. wheelchair or bedside chair): A little  To walk in hospital room: A little  Climbing 3-5 steps with railing: A little  Basic Mobility - Total Score: 18    Goals:  Encounter Problems        Encounter Problems (Active)       PT Problem       Pt will be able to perform all bed mobility tasks with Mod I.  (Progressing)       Start:  11/15/24    Expected End:  11/29/24            Pt will perform all transfers with Mod I and FWW with proper safety mechanics.   (Progressing)       Start:  11/15/24    Expected End:  11/29/24            Pt will ambulate 200 ft with Mod I using FWW for improved functional independence.  (Progressing)       Start:  11/15/24    Expected End:  11/29/24                 Education Documentation  Body Mechanics, taught by Mora Tidwell, PT at 11/15/2024  2:01 PM.  Learner: Patient  Readiness: Acceptance  Method: Explanation  Response: Verbalizes Understanding    Home Exercise Program, taught by Mora Tidwell PT at 11/15/2024  2:01 PM.  Learner: Patient  Readiness: Acceptance  Method: Explanation  Response: Verbalizes Understanding    Mobility Training, taught by Mora Tidwell, PT at 11/15/2024  2:01 PM.  Learner: Patient  Readiness: Acceptance  Method: Explanation  Response: Verbalizes Understanding    Education Comments  No comments found.

## 2024-11-15 NOTE — PROGRESS NOTES
Nutrition Initial Assessment:   Nutrition Assessment    Reason for Assessment: Admission nursing screening (MST=3 for weight loss and decreased appetite.)    Patient is a 74 y.o. male s/p fall presenting with failure to thrive and severe protein malnutrition. Pt receives oncology care through [facility].     Past Medical History:   Diagnosis Date    Adenocarcinoma of left lung (Multi)     Cancer (Multi)     Cataracts, bilateral     Chronic anemia     Metastasis (Multi)     Mixed hyperlipidemia     Nonmelanoma skin cancer     Of face, head, right forearm    Parotid lymphadenopathy     Pedal edema     Peptic ulcer disease     Recurrent falls     Squamous cell carcinoma of nose     Tobacco use disorder     Type 2 diabetes mellitus     Vitamin B12 deficiency          Nutrition History:  Energy Intake: Good > 75 %  Food and Nutrient History: Pt eating very good over the past 24 hours. He ordered a pizza for lunch today with ice cream and a diet pepsi. He reports that radiation has been rough on him. He has not been able to eat due to very dry mucous membranes, lips and mouth. He has mostly been drinking - Piyush soup broth and Boost. He did see a Dietitian recently and was able to get Boost Max delivered to his home and covered by insurance. He is to drink 3 a day and he says he tries to comply with this. He continues to explain that he will be having surgery soon and he seems to think everything will improve once he has surgery. He is in good spirits and goes on to tell me that he loves PB & Jelly and potatoes - masked, Czech fires, etc. He drinks alot of juice, orange and pear. He feels optimistic that his appetite is returning and he looks forward to his next meal. He states his weight is 148 lbs and is surprised to hear it is actually 135 lbs.  Vitamin/Herbal Supplement Use: None.  Food Allergies/Intolerances:  None  GI Symptoms: None  Oral Problems:  dryness and sores in mouth and lips from radiation   0-10  "(Numeric) Pain Score: 0 - No pain      Anthropometrics:  Height: 180.3 cm (5' 11\")   Weight: 61.2 kg (135 lb)   BMI (Calculated): 18.84  IBW/kg (Dietitian Calculated): 78.02 kg  Percent of IBW: 78.49 %       Weight History:   Wt Readings from Last 10 Encounters:   11/14/24 61.2 kg (135 lb)   11/03/24 67.1 kg (148 lb)   10/02/24         148 lb  09/04/24          151 lb      Weight Change %:  Weight History / % Weight Change: loss of 13 lbs (8.7%) within 2 weeks  Significant Weight Loss: Yes    Nutrition Focused Physical Exam Findings:    Subcutaneous Fat Loss:   Orbital Fat Pads: Mild-Moderate (slight dark circles and slight hollowing)  Buccal Fat Pads: Mild-Moderate (flat cheeks, minimal bounce)  Triceps: Severe (negligible fat tissue)  Ribs: Mild-Moderate (ribs apparent, depressions between them less pronounced, iliac crest somewhat prominent)  Muscle Wasting:  Temporalis: Mild-Moderate (slight depression)  Pectoralis (Clavicular Region): Severe (protruding prominent clavicle)  Deltoid/Trapezius: Severe (squared shoulders, acromion process prominent)  Interosseous: Mild-Moderate (slightly depressed area between thumb and forefinger)  Trapezius/Infraspinatus/Supraspinatus (Scapular Region): Defer  Quadriceps: Defer  Gastrocnemius: Defer  Edema:  Edema: +3 moderate  Edema Location: bilateral lower extremity  Physical Findings:  Mouth: Positive (sores, dry)  Skin: Positive (ecchymosis on rt cheek, rt forearm and BLE; weeping of clear fluid from BLE)    Nutrition Significant Labs:  CBC Trend:   Results from last 7 days   Lab Units 11/15/24  0653 11/14/24  2140   WBC AUTO x10*3/uL 5.6 6.3   RBC AUTO x10*6/uL 3.12* 3.34*   HEMOGLOBIN g/dL 8.7* 9.4*   HEMATOCRIT % 27.4* 29.6*   MCV fL 88 89   PLATELETS AUTO x10*3/uL 146* 162    , BMP Trend:   Results from last 7 days   Lab Units 11/15/24  0653 11/14/24  2140   GLUCOSE mg/dL 47* 144*   CALCIUM mg/dL 7.7* 8.4*   SODIUM mmol/L 138 136   POTASSIUM mmol/L 3.9 4.6   CO2 mmol/L " 27 21   CHLORIDE mmol/L 101 98   BUN mg/dL 22 27*   CREATININE mg/dL 1.13 1.44*    , TPN/PPN Labs:   Results from last 7 days   Lab Units 11/15/24  0653 11/14/24  2140   GLUCOSE mg/dL 47* 144*   POTASSIUM mmol/L 3.9 4.6   PHOSPHORUS mg/dL 3.8  --    MAGNESIUM mg/dL 1.48*  --    SODIUM mmol/L 138 136   CHLORIDE mmol/L 101 98   ALT U/L  --  20   AST U/L  --  25   ALK PHOS U/L  --  73   BILIRUBIN TOTAL mg/dL  --  0.5        Nutrition Specific Medications:  Scheduled medications  gabapentin, 800 mg, oral, TID  [Held by provider] glipiZIDE XL, 20 mg, oral, Daily  heparin (porcine), 5,000 Units, subcutaneous, q8h  insulin lispro, 0-10 Units, subcutaneous, Before meals & nightly  lidocaine, 2 patch, transdermal, Daily  lisinopril, 2.5 mg, oral, Daily  nicotine, 1 patch, transdermal, Daily  nystatin, 5 mL, Swish & Swallow, 4x daily  oral hydration, 400 mL, oral, q4h LASHELL  [Held by provider] SITagliptin phosphate, 100 mg, oral, Daily      Continuous medications     PRN medications  PRN medications: acetaminophen **OR** acetaminophen **OR** acetaminophen, alum-mag hydroxide-simeth, dextrose, dextrose, glucagon, glucagon, lidocaine, melatonin, polyethylene glycol, tiZANidine      I/O:    ;      Dietary Orders (From admission, onward)       Start     Ordered    11/15/24 1137  Adult diet Regular  Diet effective now        Question:  Diet type  Answer:  Regular    11/15/24 1136    11/15/24 0205  May Participate in Room Service  ( ROOM SERVICE MAY PARTICIPATE)  Once        Question:  .  Answer:  Yes    11/15/24 0204                     Estimated Needs:   Total Energy Estimated Needs (kCal):  (6441-0548 kcal (30-35 kcal/kg))     Total Protein Estimated Needs (g):  (67-80g protein (1.1-1.3g/kg))     Total Fluid Estimated Needs (mL):  (6612-7998 ml (1ml/kcal))           Nutrition Diagnosis   Malnutrition Diagnosis  Patient has Malnutrition Diagnosis: Yes  Diagnosis Status: New  Malnutrition Diagnosis: Severe malnutrition related to  acute disease or injury  As Evidenced by: significant weight loss of 13 lb (8.7%) within 2 weeks with minimal oral intakes of <50% of estimated nutritional needs.            Nutrition Interventions/Recommendations         Nutrition Prescription:  Individualized Nutrition Prescription Provided for : Liberalize diet to REGULAR to encourage intakes.        Nutrition Interventions:   Interventions: Meals and snacks, Medical food supplement  Meals and Snacks: Protein-modified diet  Goal: Encourage protein with every intake.  Medical Food Supplement: Commercial beverage  Goal: Supplement with ENSURE PLUS HP (350 kcal/20g protein each) with meals and MAGIC CUP @ lunch and dinner (290kcal/9g protein each)         Nutrition Education:   Encouraged intakes and assisted with ordering.       Nutrition Monitoring and Evaluation   Food/Nutrient Related History Monitoring  Monitoring and Evaluation Plan: Amount of food  Amount of Food: Estimated amout of food, Medical food intake  Criteria: Pt to consume >75% of meals and ONS    Body Composition/Growth/Weight History  Monitoring and Evaluation Plan: Weight change  Weight Change: Weight change intent, Weight gain  Criteria: Pt to return to usual weight of >150 lbs    Biochemical Data, Medical Tests and Procedures  Monitoring and Evaluation Plan: Electrolyte/renal panel  Electrolyte and Renal Panel: Magnesium  Criteria: replete per protocol    Nutrition Focused Physical Findings  Monitoring and Evaluation Plan: Digestive System, Skin  Digestive System: Decrease in appetite  Criteria: appetite to improve  Skin: Impaired wound healing  Criteria: Skin to mouth to heal.         Time Spent (min): 45 minutes

## 2024-11-15 NOTE — CONSULTS
Reason For Consult  Goals of care    History Of Present Illness  Josias Ramirez is a 74 y.o. 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      Past Medical History  He has a past medical history of Adenocarcinoma of left lung (Multi), Cancer (Multi), Cataracts, bilateral, Chronic anemia, Metastasis (Multi), Mixed hyperlipidemia, Nonmelanoma skin cancer, Parotid lymphadenopathy, Pedal edema, Peptic ulcer disease, Recurrent falls, Squamous cell carcinoma of nose, Tobacco use disorder, Type 2 diabetes mellitus, and Vitamin B12 deficiency.         Assessment/Plan     Pt receives oncology care through [facility]. Chart reviewed. Attempted to meet with pt to discuss goals of care but he was working with therapy. Plan to come back and speak with pt.     Met with pt at bedside, introduced palliative care. Discussed his current symptom burden and decreased PO intake. He attributes this to the ulcers in his house make eating undesirable. He was prescribed a mouthwash from [facility] that he has at home and states helps him. He did eat a little of his lunch today. Pt lives with his sister and would like to create a HPOA naming her. We addresses code status and pt states that he would like to remain full code. He states that his long term plan from oncology is to continue radiation with a plan to have surgery.     Discussed referral for outpatient services to [facility]. Pt is in agreement. Referral placed.    Pt to follow up with his oncologist at [facility]  No further palliative needs, will sign off.    Tamara Lu RN

## 2024-11-15 NOTE — H&P
History Of Present Illness  Josias Ramirez is a 74 y.o. 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     Past Medical History  He has a past medical history of Adenocarcinoma of left lung (Multi), Cancer (Multi), Cataracts, bilateral, Chronic anemia, Metastasis (Multi), Mixed hyperlipidemia, Nonmelanoma skin cancer, Parotid lymphadenopathy, Pedal edema, Peptic ulcer disease, Recurrent falls, Squamous cell carcinoma of nose, Tobacco use disorder, Type 2 diabetes mellitus, and Vitamin B12 deficiency.    Surgical History  He has a past surgical history that includes Sinus surgery; Pilonidal cyst drainage; Skin biopsy; and Other surgical history.     Social History  He reports that he has been smoking cigarettes. He has never used smokeless tobacco. He reports that he does not drink alcohol and does not use drugs.    Family History  Family History   Problem Relation Name Age of Onset    Other (Diabetes mellitus) Mother      Other (Diabetes mellitus) Sister      Other (Liver transplant) Brother          Allergies  Penicillins and Tramadol    Review of Systems   Constitutional:  Negative for diaphoresis and fatigue.   HENT:  Negative for ear pain, facial swelling, tinnitus and trouble swallowing.    Eyes:  Negative for photophobia and visual disturbance.   Respiratory:  Negative for choking and stridor.    Cardiovascular:  Negative for chest pain and palpitations.   Gastrointestinal:  Negative for abdominal pain, blood in stool and diarrhea.   Endocrine: Negative for cold intolerance, heat intolerance, polydipsia and polyuria.   Musculoskeletal:  Negative for back pain and joint swelling.   Skin:  Negative for color change and rash.  "  Allergic/Immunologic: Negative for food allergies.   Neurological:  Negative for tremors, facial asymmetry and weakness.   Psychiatric/Behavioral:  The patient is not hyperactive.       Physical Exam  HENT:      Right Ear: External ear normal.      Left Ear: External ear normal.      Mouth/Throat:      Mouth: Mucous membranes are moist.   Cardiovascular:      Rate and Rhythm: Normal rate and regular rhythm.      Heart sounds: No murmur heard.     No friction rub. No gallop.   Pulmonary:      Effort: No accessory muscle usage or respiratory distress.      Breath sounds: No stridor. No wheezing or rhonchi.   Chest:      Chest wall: No tenderness.   Abdominal:      General: There is no distension.      Palpations: There is no mass.      Tenderness: There is no abdominal tenderness. There is no guarding or rebound.   Musculoskeletal:         General: No deformity or signs of injury.      Cervical back: No rigidity or tenderness. Normal range of motion.      Right lower leg: No edema.      Left lower leg: No edema.   Skin:  Squamous cell cancer over his nose  Neurological:      General: No focal deficit present.      Mental Status: He is alert, oriented to person, place, and time and easily aroused.      Cranial Nerves: No cranial nerve deficit.      Sensory: No sensory deficit.      Motor: No weakness.        Last Recorded Vitals  Blood pressure 145/65, pulse 73, temperature 37.1 °C (98.8 °F), temperature source Temporal, resp. rate 18, height 1.803 m (5' 11\"), weight 61.2 kg (135 lb), SpO2 98%.    Labs    Admission on 11/14/2024   Component Date Value Ref Range Status    WBC 11/14/2024 6.3  4.4 - 11.3 x10*3/uL Final    nRBC 11/14/2024 0.0  0.0 - 0.0 /100 WBCs Final    RBC 11/14/2024 3.34 (L)  4.50 - 5.90 x10*6/uL Final    Hemoglobin 11/14/2024 9.4 (L)  13.5 - 17.5 g/dL Final    Hematocrit 11/14/2024 29.6 (L)  41.0 - 52.0 % Final    MCV 11/14/2024 89  80 - 100 fL Final    MCH 11/14/2024 28.1  26.0 - 34.0 pg Final    " MCHC 11/14/2024 31.8 (L)  32.0 - 36.0 g/dL Final    RDW 11/14/2024 15.8 (H)  11.5 - 14.5 % Final    Platelets 11/14/2024 162  150 - 450 x10*3/uL Final    Neutrophils % 11/14/2024 72.4  40.0 - 80.0 % Final    Immature Granulocytes %, Automated 11/14/2024 1.9 (H)  0.0 - 0.9 % Final    Immature Granulocyte Count (IG) includes promyelocytes, myelocytes and metamyelocytes but does not include bands. Percent differential counts (%) should be interpreted in the context of the absolute cell counts (cells/UL).    Lymphocytes % 11/14/2024 17.5  13.0 - 44.0 % Final    Monocytes % 11/14/2024 7.5  2.0 - 10.0 % Final    Eosinophils % 11/14/2024 0.2  0.0 - 6.0 % Final    Basophils % 11/14/2024 0.5  0.0 - 2.0 % Final    Neutrophils Absolute 11/14/2024 4.56  1.60 - 5.50 x10*3/uL Final    Percent differential counts (%) should be interpreted in the context of the absolute cell counts (cells/uL).    Immature Granulocytes Absolute, Au* 11/14/2024 0.12  0.00 - 0.50 x10*3/uL Final    Lymphocytes Absolute 11/14/2024 1.10  0.80 - 3.00 x10*3/uL Final    Monocytes Absolute 11/14/2024 0.47  0.05 - 0.80 x10*3/uL Final    Eosinophils Absolute 11/14/2024 0.01  0.00 - 0.40 x10*3/uL Final    Basophils Absolute 11/14/2024 0.03  0.00 - 0.10 x10*3/uL Final    Glucose 11/14/2024 144 (H)  74 - 99 mg/dL Final    Sodium 11/14/2024 136  136 - 145 mmol/L Final    Potassium 11/14/2024 4.6  3.5 - 5.3 mmol/L Final    Chloride 11/14/2024 98  98 - 107 mmol/L Final    Bicarbonate 11/14/2024 21  21 - 32 mmol/L Final    Anion Gap 11/14/2024 22 (H)  10 - 20 mmol/L Final    Urea Nitrogen 11/14/2024 27 (H)  6 - 23 mg/dL Final    Creatinine 11/14/2024 1.44 (H)  0.50 - 1.30 mg/dL Final    eGFR 11/14/2024 51 (L)  >60 mL/min/1.73m*2 Final    Calculations of estimated GFR are performed using the 2021 CKD-EPI Study Refit equation without the race variable for the IDMS-Traceable creatinine methods.  https://jasn.asnjournals.org/content/early/2021/09/22/ASN.9650610045     Calcium 11/14/2024 8.4 (L)  8.6 - 10.3 mg/dL Final    Albumin 11/14/2024 3.2 (L)  3.4 - 5.0 g/dL Final    Alkaline Phosphatase 11/14/2024 73  33 - 136 U/L Final    Total Protein 11/14/2024 5.4 (L)  6.4 - 8.2 g/dL Final    AST 11/14/2024 25  9 - 39 U/L Final    Bilirubin, Total 11/14/2024 0.5  0.0 - 1.2 mg/dL Final    ALT 11/14/2024 20  10 - 52 U/L Final    Patients treated with Sulfasalazine may generate falsely decreased results for ALT.    Troponin I, High Sensitivity 11/14/2024 16  0 - 20 ng/L Final    BNP 11/14/2024 120 (H)  0 - 99 pg/mL Final    Glucose 11/15/2024 47 (LL)  74 - 99 mg/dL Final    Sodium 11/15/2024 138  136 - 145 mmol/L Final    Potassium 11/15/2024 3.9  3.5 - 5.3 mmol/L Final    Chloride 11/15/2024 101  98 - 107 mmol/L Final    Bicarbonate 11/15/2024 27  21 - 32 mmol/L Final    Anion Gap 11/15/2024 14  10 - 20 mmol/L Final    Urea Nitrogen 11/15/2024 22  6 - 23 mg/dL Final    Creatinine 11/15/2024 1.13  0.50 - 1.30 mg/dL Final    eGFR 11/15/2024 68  >60 mL/min/1.73m*2 Final    Calculations of estimated GFR are performed using the 2021 CKD-EPI Study Refit equation without the race variable for the IDMS-Traceable creatinine methods.  https://jasn.asnjournals.org/content/early/2021/09/22/ASN.1726359613    Calcium 11/15/2024 7.7 (L)  8.6 - 10.3 mg/dL Final    WBC 11/15/2024 5.6  4.4 - 11.3 x10*3/uL Final    nRBC 11/15/2024 0.0  0.0 - 0.0 /100 WBCs Final    RBC 11/15/2024 3.12 (L)  4.50 - 5.90 x10*6/uL Final    Hemoglobin 11/15/2024 8.7 (L)  13.5 - 17.5 g/dL Final    Hematocrit 11/15/2024 27.4 (L)  41.0 - 52.0 % Final    MCV 11/15/2024 88  80 - 100 fL Final    MCH 11/15/2024 27.9  26.0 - 34.0 pg Final    MCHC 11/15/2024 31.8 (L)  32.0 - 36.0 g/dL Final    RDW 11/15/2024 15.9 (H)  11.5 - 14.5 % Final    Platelets 11/15/2024 146 (L)  150 - 450 x10*3/uL Final    Magnesium 11/15/2024 1.48 (L)  1.60 - 2.40 mg/dL Final    Phosphorus 11/15/2024 3.8  2.5 - 4.9 mg/dL Final    The performance characteristics  of phosphorus testing in heparinized plasma have been validated by the individual  laboratory site where testing is performed. Testing on heparinized plasma is not approved by the FDA; however, such approval is not necessary.    Iron 11/15/2024 50  35 - 150 ug/dL Final    UIBC 11/15/2024 98 (L)  110 - 370 ug/dL Final    TIBC 11/15/2024 148 (L)  240 - 445 ug/dL Final    % Saturation 11/15/2024 34  25 - 45 % Final    Ferritin 11/15/2024 785 (H)  20 - 300 ng/mL Final    POCT Glucose 11/15/2024 38 (L)  74 - 99 mg/dL Final    MD Notified    POCT Glucose 11/15/2024 159 (H)  74 - 99 mg/dL Final         Imaging     XR ankle left 3+ views  Narrative: STUDY:  Ankle Radiographs;  11/15/2024  1:18AM  INDICATION:  Left ankle pain and swelling.  COMPARISON:  None Available.  ACCESSION NUMBER(S):  [id number]  ORDERING CLINICIAN:  NIKKI MARCELINO  TECHNIQUE:  Three view(s) of the left ankle.  FINDINGS:    There is no displaced fracture.  The alignment is anatomic.   Moderate-sized Achilles enthesophyte is noted.  There is extensive  soft tissue swelling surrounding the ankle.  Impression: No acute osseous abnormality identified.  Extensive soft tissue swelling surrounding the ankle.  Signed by Oz Dial       Patient Active Problem List   Diagnosis    Fall, initial encounter    LAVINIA (acute kidney injury) (CMS-HCC)    Severe protein-calorie malnutrition (Multi)    Tobacco use disorder    Adenocarcinoma of left lung (Multi)    Type 2 diabetes mellitus    Squamous cell carcinoma of nose    Chronic anemia         Assessment/Plan   Assessment & Plan  Fall, initial encounter    LAVINIA (acute kidney injury) (CMS-HCC)    Severe protein-calorie malnutrition (Multi)    Tobacco use disorder    Adenocarcinoma of left lung (Multi)    Type 2 diabetes mellitus    Squamous cell carcinoma of nose    Chronic anemia      Failure to thrive with severe protein waist malnutrition considering his malignancy age and the recent fall physical therapy was  ordered on the patient for improve mobility and encouraged to eat high-protein diet  Squamous cell cancer of his nose follow-up with dermatology   Mild LAVINIA e encourage increased fluid intake monitor BUN and creatinine  History of diabetes keep patient on sliding scale  Lung cancer looks in remission currently need to follow-up with oncology    I spent 50 minutes in the professional and overall care of this patient.      RAMILA Gordon MD

## 2024-11-15 NOTE — ED PROVIDER NOTES
"EMERGENCY DEPARTMENT ENCOUNTER      Pt Name: Josias Ramirez  MRN: [id number]  Birthdate 1950  Date of evaluation: 11/14/2024  Provider: Skyler Denise MD    CHIEF COMPLAINT       Chief Complaint   Patient presents with    Fall     Fall x2 days ago pt landed on the R side of his face and arm; denies blood thinner use; pt endorsing lower back and bilateral leg pain; pt endorsing generalized weakness lasst chemo/radiation x1 week ago for \"nose cancer\"; has had decreased appetite x3 weeks intaking liquids orally only, stating significant weight loss.      HISTORY OF PRESENT ILLNESS    Josias Ramirez is a 74 y.o. year old male who presents to the ER for fall 2 days ago, reports that he hit the right side of his face and arm.  The patient reports severe back pain.  The patient reports that he was very weak when he was trying to walk and stumbled and fell.  Patient denies headaches, loss of consciousness, altered mental status.  Patient also denies loss of bowel and bladder control. the sister reports that the patient still has a poor appetite though he has been drinking the meal replacement shakes.  Patient denies leg symptoms.    Patient is significant for cell cancer of the nose, lung cancer, on chemo and radiation.     PAST MEDICAL HISTORY     Past Medical History:   Diagnosis Date    Adenocarcinoma of left lung (Multi)     Arthritis     Cancer (Multi)     Cataracts, bilateral     Chronic anemia     Mixed hyperlipidemia     Nonmelanoma skin cancer     Of face, head, right forearm    Pedal edema     Peptic ulcer disease     Squamous cell carcinoma of nose     Tobacco use disorder     Type 2 diabetes mellitus     Vitamin B12 deficiency      CURRENT MEDICATIONS       Previous Medications    No medications on file     SURGICAL HISTORY       Past Surgical History:   Procedure Laterality Date    OTHER SURGICAL HISTORY      Multiple Mohs procedures    PILONIDAL CYST DRAINAGE      SINUS SURGERY      SKIN BIOPSY       ALLERGIES  "    Penicillins and Tramadol  FAMILY HISTORY       Family History   Problem Relation Name Age of Onset    Other (Diabetes mellitus) Mother      Other (Diabetes mellitus) Sister      Other (Liver transplant) Brother       SOCIAL HISTORY       Social History     Tobacco Use    Smoking status: Every Day     Current packs/day: 0.50     Types: Cigarettes    Smokeless tobacco: Never   Vaping Use    Vaping status: Never Used   Substance Use Topics    Alcohol use: Never    Drug use: Never     PHYSICAL EXAM  (up to 7 for level 4, 8 or more for level 5)     ED Triage Vitals [24]   Temperature Heart Rate Respirations BP   36.4 °C (97.5 °F) 85 20 (!) 129/49      Pulse Ox Temp Source Heart Rate Source Patient Position   97 % Temporal -- --      BP Location FiO2 (%)     -- --       Physical Exam  Constitutional:       Appearance: He is cachectic.   HENT:      Head:      Comments: Ecchymosis on the right cheek.     Nose:      Comments: Unchanged deformity from the squamous cell cancer.     Mouth/Throat:      Mouth: Mucous membranes are moist.   Eyes:      Extraocular Movements: Extraocular movements intact.      Conjunctiva/sclera: Conjunctivae normal.      Pupils: Pupils are equal, round, and reactive to light.   Cardiovascular:      Rate and Rhythm: Normal rate and regular rhythm.      Pulses: Normal pulses.      Heart sounds: Normal heart sounds.   Pulmonary:      Effort: Pulmonary effort is normal.      Breath sounds: Normal breath sounds.   Abdominal:      General: Abdomen is flat.      Palpations: Abdomen is soft.      Tenderness: There is no abdominal tenderness. There is no guarding or rebound.   Musculoskeletal:      Cervical back: Normal range of motion and neck supple. No rigidity or tenderness.      Right lower le+ Pitting Edema present.      Left lower leg: 3+ Pitting Edema present.      Right ankle: Swelling present.      Left ankle: Swelling present.   Neurological:      Mental Status: He is alert.         DIAGNOSTIC RESULTS   LABS:  Labs Reviewed   CBC WITH AUTO DIFFERENTIAL - Abnormal       Result Value    WBC 6.3      nRBC 0.0      RBC 3.34 (*)     Hemoglobin 9.4 (*)     Hematocrit 29.6 (*)     MCV 89      MCH 28.1      MCHC 31.8 (*)     RDW 15.8 (*)     Platelets 162      Neutrophils % 72.4      Immature Granulocytes %, Automated 1.9 (*)     Lymphocytes % 17.5      Monocytes % 7.5      Eosinophils % 0.2      Basophils % 0.5      Neutrophils Absolute 4.56      Immature Granulocytes Absolute, Automated 0.12      Lymphocytes Absolute 1.10      Monocytes Absolute 0.47      Eosinophils Absolute 0.01      Basophils Absolute 0.03     COMPREHENSIVE METABOLIC PANEL - Abnormal    Glucose 144 (*)     Sodium 136      Potassium 4.6      Chloride 98      Bicarbonate 21      Anion Gap 22 (*)     Urea Nitrogen 27 (*)     Creatinine 1.44 (*)     eGFR 51 (*)     Calcium 8.4 (*)     Albumin 3.2 (*)     Alkaline Phosphatase 73      Total Protein 5.4 (*)     AST 25      Bilirubin, Total 0.5      ALT 20     B-TYPE NATRIURETIC PEPTIDE - Abnormal     (*)     Narrative:        <100 pg/mL - Heart failure unlikely  100-299 pg/mL - Intermediate probability of acute heart                  failure exacerbation. Correlate with clinical                  context and patient history.    >=300 pg/mL - Heart Failure likely. Correlate with clinical                  context and patient history.    BNP testing is performed using different testing methodology at [facility] than at other [facility] hospitals. Direct result comparisons should only be made within the same method.      TROPONIN I, HIGH SENSITIVITY - Normal    Troponin I, High Sensitivity 16      Narrative:     Less than 99th percentile of normal range cutoff-  Female and children under 18 years old <14 ng/L; Male <21 ng/L: Negative  Repeat testing should be performed if clinically indicated.     Female and children under 18 years old 14-50 ng/L; Male 21-50  ng/L:  Consistent with possible cardiac damage and possible increased clinical   risk. Serial measurements may help to assess extent of myocardial damage.     >50 ng/L: Consistent with cardiac damage, increased clinical risk and  myocardial infarction. Serial measurements may help assess extent of   myocardial damage.      NOTE: Children less than 1 year old may have higher baseline troponin   levels and results should be interpreted in conjunction with the overall   clinical context.     NOTE: Troponin I testing is performed using a different   testing methodology at [facility] than at other   [facility]. Direct result comparisons should only   be made within the same method.   URINALYSIS WITH REFLEX CULTURE AND MICROSCOPIC    Narrative:     The following orders were created for panel order Urinalysis with Reflex Culture and Microscopic.  Procedure                               Abnormality         Status                     ---------                               -----------         ------                     Urinalysis with Reflex C...[[id number]]                                                 Extra Urine Gray Tube[[id number]]                                                         Please view results for these tests on the individual orders.   URINALYSIS WITH REFLEX CULTURE AND MICROSCOPIC   EXTRA URINE GRAY TUBE     All other labs were within normal range or not returned as of this dictation.  Imaging  CT lumbar spine wo IV contrast   Final Result   No evidence of acute thoracic, abdominal, or pelvic trauma identified.   Spiculated 1.4 cm left upper lobe pulmonary nodule, suspicious for   possible malignancy.  Further evaluation is recommended with PET/CT.   4 mm right apical pulmonary nodule.   7 mm enhancing focus in the spleen, likely hemangioma but incompletely   characterized on this exam.   Mild wall thickening of the gastric antrum which may indicate mild   gastritis in the appropriate  clinical setting.   Signed by Oz  Dial      CT thoracic spine wo IV contrast   Final Result   No evidence of acute thoracic, abdominal, or pelvic trauma identified.   Spiculated 1.4 cm left upper lobe pulmonary nodule, suspicious for   possible malignancy.  Further evaluation is recommended with PET/CT.   4 mm right apical pulmonary nodule.   7 mm enhancing focus in the spleen, likely hemangioma but incompletely   characterized on this exam.   Mild wall thickening of the gastric antrum which may indicate mild   gastritis in the appropriate clinical setting.   Signed by [facility] Dial      CT chest abdomen pelvis w IV contrast   Final Result   No evidence of acute thoracic, abdominal, or pelvic trauma identified.   Spiculated 1.4 cm left upper lobe pulmonary nodule, suspicious for   possible malignancy.  Further evaluation is recommended with PET/CT.   4 mm right apical pulmonary nodule.   7 mm enhancing focus in the spleen, likely hemangioma but incompletely   characterized on this exam.   Mild wall thickening of the gastric antrum which may indicate mild   gastritis in the appropriate clinical setting.   Signed by Ozolivia Dial      CT head wo IV contrast   Final Result   No evidence of acute intracranial hemorrhage or depressed calvarial   fracture.        Cerebral atrophy and chronic periventricular white matter small   vessel ischemic change.        Irregularity and apparent defect in the left anterior nasal soft   tissues; clinical correlation with physical examination recommended..        MACRO:   None        Signed by: Kameron Quiros 11/14/2024 11:53 PM   Dictation workstation:   [id number]      CT cervical spine wo IV contrast   Final Result   No evidence of acute fracture of the cervical spine.        Multilevel degenerative change of the cervical spine with extensive   ossification of the posterior longitudinal ligament resulting in   multilevel spinal canal stenosis.        MACRO:   None        Signed  by: Kameron Quiros 11/14/2024 11:55 PM   Dictation workstation:   [id number]      XR ankle left 3+ views    (Results Pending)      Procedure  Procedures  EMERGENCY DEPARTMENT COURSE/MDM:   Medical Decision Making  Patient is a 74-year-old male who comes to the ED post a fall with back pain.  The differential diagnoses include metastatic disease, C-spine injury, thoracic spine injury, lumbar spine injury.     Physical exam is significant for a cachectic ill appearing patient, worse than the last time I have seen him in the ED.  There is 2+ pitting edema in the left ankle, 3+ pitting edema in the right ankle.  The areas of edema are weeping.  The feet are cold, cap refills are 2 to 3 seconds, dorsalis pedis pulses are 2+ bilaterally.    BNP was 120, troponin were normal, CMP showed total protein of 5.4 consistent with his history of malnutrition.  CBC showed evidence of dehydration and slight anemia.    Patient was given morphine for his pain, and a bolus of normal saline.  On reassessment the patient reports he feels better and he is hungry.    The hospitalist was consulted and agreed to admit the patient for his malnutrition and his weakness.       Vitals:    Vitals:    11/14/24 2054 11/14/24 2225 11/15/24 0000 11/15/24 0100   BP:  159/72 154/68 147/64   BP Location:  Right arm     Patient Position:  Lying     Pulse:  82 78 78   Resp: 17 (!) 30 15 19   Temp:       TempSrc:       SpO2: 95% 97% 100% 100%   Weight:       Height:             Diagnoses as of 11/15/24 0122   Fall, initial encounter   Weakness   Severe protein-calorie malnutrition (Multi)       Consultant discussions: The hospitalist was consulted and agreed for admission.    External Records Reviewed: I reviewed recent and relevant outside records including inpatient notes, outpatient records      Shared decision making for disposition  Patient and/or patient´s representative was counseled regarding labs, imaging, likely diagnosis. All questions were  answered. Recommendation was made   for Admission given the need for further escalation of care to inpatient management. Patient agreed and was admitted in stable condition. Admitting team was notified of any pending labs or imaging to ensure continuity of care.     ED Medications administered this visit:    Medications   HYDROmorphone (Dilaudid) injection 0.5 mg (0.5 mg intravenous Given 11/14/24 2221)   iohexol (OMNIPaque) 350 mg iodine/mL solution 100 mL (100 mL intravenous Given 11/14/24 2251)   sodium chloride 0.9 % bolus 1,000 mL (0 mL intravenous Stopped 11/15/24 0025)       New Prescriptions from this visit:    New Prescriptions    No medications on file       Follow-up:  No follow-up provider specified.      Final Impression:   1. Fall, initial encounter    2. Weakness    3. Severe protein-calorie malnutrition (Multi)          Please excuse any misspellings or unintended errors related to the Dragon speech recognition software used to dictate this note.    I reviewed the case with the attending ED physician. The attending ED physician agrees with the plan.      Skyler Denise MD  Resident  11/15/24 7754

## 2024-11-15 NOTE — PROGRESS NOTES
Spiritual Care Visit    Clinical Encounter Type  Visited With: Patient  Routine Visit: Introduction  Continue Visiting: No                                            Taxonomy  Intended Effects: Promote sense of peace, Preserve dignity and respect, Meaning-making  Methods: Offer spiritual/Tenriism support  Interventions: Share words of hope and inspiration, Assist someone with Advance Directives, [place]     helped the patient complete advance directives.  Patient was with his sister.  Patient lives with his sister and his 100 year old mom.   gave him a rosary and prayed at his request.

## 2024-11-16 VITALS
OXYGEN SATURATION: 99 % | HEART RATE: 85 BPM | SYSTOLIC BLOOD PRESSURE: 162 MMHG | WEIGHT: 135 LBS | RESPIRATION RATE: 16 BRPM | BODY MASS INDEX: 18.9 KG/M2 | TEMPERATURE: 98.4 F | DIASTOLIC BLOOD PRESSURE: 71 MMHG | HEIGHT: 71 IN

## 2024-11-16 LAB
ANION GAP SERPL CALC-SCNC: 12 MMOL/L (ref 10–20)
BUN SERPL-MCNC: 15 MG/DL (ref 6–23)
CALCIUM SERPL-MCNC: 8 MG/DL (ref 8.6–10.3)
CHLORIDE SERPL-SCNC: 101 MMOL/L (ref 98–107)
CO2 SERPL-SCNC: 28 MMOL/L (ref 21–32)
CREAT SERPL-MCNC: 0.99 MG/DL (ref 0.5–1.3)
EGFRCR SERPLBLD CKD-EPI 2021: 80 ML/MIN/1.73M*2
ERYTHROCYTE [DISTWIDTH] IN BLOOD BY AUTOMATED COUNT: 15.9 % (ref 11.5–14.5)
GLUCOSE BLD MANUAL STRIP-MCNC: 124 MG/DL (ref 74–99)
GLUCOSE BLD MANUAL STRIP-MCNC: 290 MG/DL (ref 74–99)
GLUCOSE BLD MANUAL STRIP-MCNC: 90 MG/DL (ref 74–99)
GLUCOSE SERPL-MCNC: 71 MG/DL (ref 74–99)
HCT VFR BLD AUTO: 26.5 % (ref 41–52)
HGB BLD-MCNC: 8.3 G/DL (ref 13.5–17.5)
HOLD SPECIMEN: NORMAL
MAGNESIUM SERPL-MCNC: 1.66 MG/DL (ref 1.6–2.4)
MCH RBC QN AUTO: 28.2 PG (ref 26–34)
MCHC RBC AUTO-ENTMCNC: 31.3 G/DL (ref 32–36)
MCV RBC AUTO: 90 FL (ref 80–100)
NRBC BLD-RTO: 0 /100 WBCS (ref 0–0)
PLATELET # BLD AUTO: 131 X10*3/UL (ref 150–450)
POTASSIUM SERPL-SCNC: 3.9 MMOL/L (ref 3.5–5.3)
RBC # BLD AUTO: 2.94 X10*6/UL (ref 4.5–5.9)
SODIUM SERPL-SCNC: 137 MMOL/L (ref 136–145)
WBC # BLD AUTO: 5.7 X10*3/UL (ref 4.4–11.3)

## 2024-11-16 PROCEDURE — 2500000002 HC RX 250 W HCPCS SELF ADMINISTERED DRUGS (ALT 637 FOR MEDICARE OP, ALT 636 FOR OP/ED)

## 2024-11-16 PROCEDURE — 96372 THER/PROPH/DIAG INJ SC/IM: CPT

## 2024-11-16 PROCEDURE — 80048 BASIC METABOLIC PNL TOTAL CA: CPT

## 2024-11-16 PROCEDURE — 36415 COLL VENOUS BLD VENIPUNCTURE: CPT

## 2024-11-16 PROCEDURE — 2500000001 HC RX 250 WO HCPCS SELF ADMINISTERED DRUGS (ALT 637 FOR MEDICARE OP): Performed by: NURSE PRACTITIONER

## 2024-11-16 PROCEDURE — 2500000005 HC RX 250 GENERAL PHARMACY W/O HCPCS: Performed by: NURSE PRACTITIONER

## 2024-11-16 PROCEDURE — 2500000004 HC RX 250 GENERAL PHARMACY W/ HCPCS (ALT 636 FOR OP/ED)

## 2024-11-16 PROCEDURE — 2500000005 HC RX 250 GENERAL PHARMACY W/O HCPCS

## 2024-11-16 PROCEDURE — S4991 NICOTINE PATCH NONLEGEND: HCPCS

## 2024-11-16 PROCEDURE — 83735 ASSAY OF MAGNESIUM: CPT

## 2024-11-16 PROCEDURE — 82947 ASSAY GLUCOSE BLOOD QUANT: CPT

## 2024-11-16 PROCEDURE — 85027 COMPLETE CBC AUTOMATED: CPT

## 2024-11-16 PROCEDURE — G0378 HOSPITAL OBSERVATION PER HR: HCPCS

## 2024-11-16 PROCEDURE — 2500000002 HC RX 250 W HCPCS SELF ADMINISTERED DRUGS (ALT 637 FOR MEDICARE OP, ALT 636 FOR OP/ED): Performed by: NURSE PRACTITIONER

## 2024-11-16 RX ORDER — TAMSULOSIN HYDROCHLORIDE 0.4 MG/1
0.4 CAPSULE ORAL DAILY
Qty: 30 CAPSULE | Refills: 0 | Status: ON HOLD | OUTPATIENT
Start: 2024-11-16 | End: 2024-12-16

## 2024-11-16 RX ORDER — LIDOCAINE HYDROCHLORIDE 20 MG/ML
1 JELLY TOPICAL ONCE
Status: COMPLETED | OUTPATIENT
Start: 2024-11-16 | End: 2024-11-16

## 2024-11-16 RX ORDER — LIDOCAINE 560 MG/1
2 PATCH PERCUTANEOUS; TOPICAL; TRANSDERMAL DAILY
Qty: 30 PATCH | Refills: 0 | Status: ON HOLD | OUTPATIENT
Start: 2024-11-17

## 2024-11-16 RX ORDER — TAMSULOSIN HYDROCHLORIDE 0.4 MG/1
0.4 CAPSULE ORAL DAILY
Status: DISCONTINUED | OUTPATIENT
Start: 2024-11-16 | End: 2024-11-16 | Stop reason: HOSPADM

## 2024-11-16 RX ORDER — IBUPROFEN 200 MG
1 TABLET ORAL DAILY
Qty: 28 PATCH | Refills: 0 | Status: ON HOLD | OUTPATIENT
Start: 2024-11-17

## 2024-11-16 ASSESSMENT — PAIN SCALES - GENERAL: PAINLEVEL_OUTOF10: 5 - MODERATE PAIN

## 2024-11-16 NOTE — CARE PLAN
The patient's goals for the shift include sleep and comfort    The clinical goals for the shift include Patient will remain safe and free from fall/injury thru the end of this shift. Safety maintained. Gave melatonin to help with sleep. Purwick removed and patient has been using urinal overnight. Pedal pulses doppled. States he feels better and can finally stretch out his legs today.       Problem: Safety - Adult  Goal: Free from fall injury  Outcome: Progressing     Problem: Pain - Adult  Goal: Verbalizes/displays adequate comfort level or baseline comfort level  Outcome: Progressing     Problem: Discharge Planning  Goal: Discharge to home or other facility with appropriate resources  Outcome: Progressing     Problem: Chronic Conditions and Co-morbidities  Goal: Patient's chronic conditions and co-morbidity symptoms are monitored and maintained or improved  Outcome: Progressing     Problem: Skin  Goal: Decreased wound size/increased tissue granulation at next dressing change  Outcome: Progressing  Goal: Participates in plan/prevention/treatment measures  Outcome: Progressing  Goal: Prevent/manage excess moisture  Outcome: Progressing  Goal: Prevent/minimize sheer/friction injuries  Outcome: Progressing  Goal: Promote/optimize nutrition  Outcome: Progressing  Goal: Promote skin healing  Outcome: Progressing     Problem: Fall/Injury  Goal: Not fall by end of shift  Outcome: Progressing  Goal: Be free from injury by end of the shift  Outcome: Progressing  Goal: Verbalize understanding of personal risk factors for fall in the hospital  Outcome: Progressing  Goal: Verbalize understanding of risk factor reduction measures to prevent injury from fall in the home  Outcome: Progressing  Goal: Use assistive devices by end of the shift  Outcome: Progressing  Goal: Pace activities to prevent fatigue by end of the shift  Outcome: Progressing     Problem: Pain  Goal: Takes deep breaths with improved pain control throughout the  shift  Outcome: Progressing  Goal: Turns in bed with improved pain control throughout the shift  Outcome: Progressing  Goal: Walks with improved pain control throughout the shift  Outcome: Progressing  Goal: Performs ADL's with improved pain control throughout shift  Outcome: Progressing  Goal: Participates in PT with improved pain control throughout the shift  Outcome: Progressing  Goal: Free from opioid side effects throughout the shift  Outcome: Progressing  Goal: Free from acute confusion related to pain meds throughout the shift  Outcome: Progressing     Problem: Diabetes  Goal: Increase stability of blood glucose readings by end of shift  Outcome: Progressing  Goal: Maintain electrolyte levels within acceptable range throughout shift  Outcome: Progressing  Goal: Maintain glucose levels >70mg/dl to <250mg/dl throughout shift  Outcome: Progressing  Goal: No changes in neurological exam by end of shift  Outcome: Progressing  Goal: Learn about and adhere to nutrition recommendations by end of shift  Outcome: Progressing  Goal: Vital signs within normal range for age by end of shift  Outcome: Progressing  Goal: Increase self care and/or family involovement by end of shift  Outcome: Progressing  Goal: Receive DSME education by end of shift  Outcome: Progressing

## 2024-11-16 NOTE — PROGRESS NOTES
11/16/24 1431   Discharge Planning   Living Arrangements Family members;Parent   Support Systems Family members;Parent   Type of Residence Private residence   Home or Post Acute Services In home services   Expected Discharge Disposition Home H   Does the patient need discharge transport arranged? Yes     [facility]. Intake notified.

## 2024-11-16 NOTE — DISCHARGE SUMMARY
Discharge Diagnosis  Fall, initial encounter    Issues Requiring Follow-Up  Follow-up with orthopedic    Discharge Meds     Medication List      ASK your doctor about these medications     gabapentin 800 mg tablet; Commonly known as: Neurontin   glipiZIDE XL 10 mg 24 hr tablet; Commonly known as: Glucotrol XL   lidocaine-diphenhydrAMINE-Maalox 1:1:1 225--40 mg/30 mL liquid   mouthwash; Commonly known as: Magic Mouthwash   lisinopril 2.5 mg tablet   nystatin 100,000 unit/mL suspension; Commonly known as: Mycostatin   SITagliptin phosphate 100 mg tablet; Commonly known as: Januvia   tiZANidine 4 mg tablet; Commonly known as: Zanaflex       Test Results Pending At Discharge  Pending Labs       No current pending labs.            Hospital Course   Patient was admitted to the hospital status post fall and weakness patient had a history of lung cancer and squamous cell cancer over his nose patient was dehydrated patient was given IV fluid with improvement of his symptoms patient will be discharged home follow-up as an outpatient with orthopedic patient developed urine retention I will start him on Flomax  If he could not be will insert Manning catheter was follow-up as an outpatient with urology    Pertinent Physical Exam At Time of Discharge  Physical Exam  HENT:      Right Ear: External ear normal.      Left Ear: External ear normal.      Mouth/Throat:      Mouth: Mucous membranes are moist.   Cardiovascular:      Rate and Rhythm: Normal rate and regular rhythm.      Heart sounds: No murmur heard.     No friction rub. No gallop.   Pulmonary:      Effort: No accessory muscle usage or respiratory distress.      Breath sounds: No stridor. No wheezing or rhonchi.   Chest:      Chest wall: No tenderness.   Abdominal:      General: There is no distension.      Palpations: There is no mass.      Tenderness: There is no abdominal tenderness. There is no guarding or rebound.   Musculoskeletal:         General: No deformity  or signs of injury.      Cervical back: No rigidity or tenderness. Normal range of motion.      Right lower leg: No edema.      Left lower leg: No edema.   Skin:     Coloration: Skin is not jaundiced or pale.      Findings: No lesion.   Neurological:      General: No focal deficit present.      Mental Status: He is alert, oriented to person, place, and time and easily aroused.      Cranial Nerves: No cranial nerve deficit.      Sensory: No sensory deficit.      Motor: No weakness.         Outpatient Follow-Up  No future appointments.      RAMILA Gordon MD

## 2024-11-16 NOTE — DISCHARGE INSTRUCTIONS
Please stop taking your glipizide since your blood sugar has been significantly low. Please continue to monitor your blood sugar at home before meals, at bedtime and as needed for symptoms of hypoglycemia.     Please start taking Flomax that has been prescribed for you for urinary retention. You will follow up with a urologist in 1-2 weeks for voiding trial.

## 2024-11-17 ENCOUNTER — DOCUMENTATION (OUTPATIENT)
Dept: HOME HEALTH SERVICES | Facility: HOME HEALTH | Age: 74
End: 2024-11-17
Payer: COMMERCIAL

## 2024-11-17 NOTE — HH CARE COORDINATION
Home Care received a Referral for Nursing, Physical Therapy, Occupational Therapy, and Registered Dietician. We have processed the referral for a Start of Care on 11/19/24.     If you have any questions or concerns, please feel free to contact us at [phone]. Follow the prompts, enter your five digit zip code, and you will be directed to your care team on [facility].

## 2024-11-19 ENCOUNTER — HOSPITAL ENCOUNTER (EMERGENCY)
Facility: HOSPITAL | Age: 74
Discharge: HOME | End: 2024-11-19
Attending: EMERGENCY MEDICINE
Payer: COMMERCIAL

## 2024-11-19 ENCOUNTER — APPOINTMENT (OUTPATIENT)
Dept: CARDIOLOGY | Facility: HOSPITAL | Age: 74
End: 2024-11-19
Payer: COMMERCIAL

## 2024-11-19 ENCOUNTER — APPOINTMENT (OUTPATIENT)
Dept: RADIOLOGY | Facility: HOSPITAL | Age: 74
End: 2024-11-19
Payer: COMMERCIAL

## 2024-11-19 ENCOUNTER — TELEPHONE (OUTPATIENT)
Dept: HEMATOLOGY/ONCOLOGY | Facility: HOSPITAL | Age: 74
End: 2024-11-19
Payer: COMMERCIAL

## 2024-11-19 VITALS
DIASTOLIC BLOOD PRESSURE: 54 MMHG | RESPIRATION RATE: 16 BRPM | SYSTOLIC BLOOD PRESSURE: 101 MMHG | OXYGEN SATURATION: 98 % | HEIGHT: 66 IN | HEART RATE: 77 BPM | WEIGHT: 134.92 LBS | TEMPERATURE: 98.1 F | BODY MASS INDEX: 21.68 KG/M2

## 2024-11-19 DIAGNOSIS — R53.1 WEAKNESS: ICD-10-CM
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
## 2024-11-19 DIAGNOSIS — R91.1 LUNG NODULE: ICD-10-CM

## 2024-11-19 DIAGNOSIS — D18.1 HYGROMA: ICD-10-CM

## 2024-11-19 LAB
ALBUMIN SERPL BCP-MCNC: 2.8 G/DL (ref 3.4–5)
ALP SERPL-CCNC: 68 U/L (ref 33–136)
ALT SERPL W P-5'-P-CCNC: 18 U/L (ref 10–52)
ANION GAP SERPL CALC-SCNC: 11 MMOL/L (ref 10–20)
AST SERPL W P-5'-P-CCNC: 19 U/L (ref 9–39)
ATRIAL RATE: 67 BPM
ATRIAL RATE: 72 BPM
BASOPHILS # BLD AUTO: 0.04 X10*3/UL (ref 0–0.1)
BASOPHILS NFR BLD AUTO: 0.7 %
BILIRUB SERPL-MCNC: 0.6 MG/DL (ref 0–1.2)
BUN SERPL-MCNC: 19 MG/DL (ref 6–23)
CALCIUM SERPL-MCNC: 7.9 MG/DL (ref 8.6–10.3)
CARDIAC TROPONIN I PNL SERPL HS: 12 NG/L (ref 0–20)
CARDIAC TROPONIN I PNL SERPL HS: 14 NG/L (ref 0–20)
CHLORIDE SERPL-SCNC: 98 MMOL/L (ref 98–107)
CO2 SERPL-SCNC: 30 MMOL/L (ref 21–32)
CREAT SERPL-MCNC: 1.09 MG/DL (ref 0.5–1.3)
EGFRCR SERPLBLD CKD-EPI 2021: 71 ML/MIN/1.73M*2
EOSINOPHIL # BLD AUTO: 0.01 X10*3/UL (ref 0–0.4)
EOSINOPHIL NFR BLD AUTO: 0.2 %
ERYTHROCYTE [DISTWIDTH] IN BLOOD BY AUTOMATED COUNT: 16.5 % (ref 11.5–14.5)
GLUCOSE SERPL-MCNC: 164 MG/DL (ref 74–99)
HCT VFR BLD AUTO: 26 % (ref 41–52)
HGB BLD-MCNC: 8.3 G/DL (ref 13.5–17.5)
HOLD SPECIMEN: NORMAL
HOLD SPECIMEN: NORMAL
IMM GRANULOCYTES # BLD AUTO: 0.26 X10*3/UL (ref 0–0.5)
IMM GRANULOCYTES NFR BLD AUTO: 4.6 % (ref 0–0.9)
LYMPHOCYTES # BLD AUTO: 0.93 X10*3/UL (ref 0.8–3)
LYMPHOCYTES NFR BLD AUTO: 16.3 %
MAGNESIUM SERPL-MCNC: 1.5 MG/DL (ref 1.6–2.4)
MCH RBC QN AUTO: 28.4 PG (ref 26–34)
MCHC RBC AUTO-ENTMCNC: 31.9 G/DL (ref 32–36)
MCV RBC AUTO: 89 FL (ref 80–100)
MONOCYTES # BLD AUTO: 0.54 X10*3/UL (ref 0.05–0.8)
MONOCYTES NFR BLD AUTO: 9.5 %
NEUTROPHILS # BLD AUTO: 3.92 X10*3/UL (ref 1.6–5.5)
NEUTROPHILS NFR BLD AUTO: 68.7 %
NRBC BLD-RTO: 0 /100 WBCS (ref 0–0)
P AXIS: 83 DEGREES
P OFFSET: 186 MS
P OFFSET: 192 MS
P ONSET: 149 MS
P ONSET: 149 MS
PLATELET # BLD AUTO: 158 X10*3/UL (ref 150–450)
POTASSIUM SERPL-SCNC: 3.6 MMOL/L (ref 3.5–5.3)
PR INTERVAL: 150 MS
PR INTERVAL: 154 MS
PROT SERPL-MCNC: 5 G/DL (ref 6.4–8.2)
Q ONSET: 224 MS
Q ONSET: 226 MS
QRS COUNT: 11 BEATS
QRS COUNT: 12 BEATS
QRS DURATION: 66 MS
QRS DURATION: 70 MS
QT INTERVAL: 380 MS
QT INTERVAL: 384 MS
QTC CALCULATION(BAZETT): 405 MS
QTC CALCULATION(BAZETT): 416 MS
QTC FREDERICIA: 398 MS
QTC FREDERICIA: 404 MS
R AXIS: -5 DEGREES
R AXIS: 4 DEGREES
RBC # BLD AUTO: 2.92 X10*6/UL (ref 4.5–5.9)
SARS-COV-2 RNA RESP QL NAA+PROBE: NOT DETECTED
SODIUM SERPL-SCNC: 135 MMOL/L (ref 136–145)
T AXIS: -81 DEGREES
T AXIS: 77 DEGREES
T OFFSET: 416 MS
T OFFSET: 416 MS
VENTRICULAR RATE: 67 BPM
VENTRICULAR RATE: 72 BPM
WBC # BLD AUTO: 5.7 X10*3/UL (ref 4.4–11.3)

## 2024-11-19 PROCEDURE — 72220 X-RAY EXAM SACRUM TAILBONE: CPT | Mod: FY

## 2024-11-19 PROCEDURE — 70450 CT HEAD/BRAIN W/O DYE: CPT

## 2024-11-19 PROCEDURE — 72220 X-RAY EXAM SACRUM TAILBONE: CPT | Mod: COMPUTED RADIOGRAPHY X-RAY | Performed by: RADIOLOGY

## 2024-11-19 PROCEDURE — 83735 ASSAY OF MAGNESIUM: CPT | Performed by: EMERGENCY MEDICINE

## 2024-11-19 PROCEDURE — 71045 X-RAY EXAM CHEST 1 VIEW: CPT | Performed by: RADIOLOGY

## 2024-11-19 PROCEDURE — 72125 CT NECK SPINE W/O DYE: CPT

## 2024-11-19 PROCEDURE — 93005 ELECTROCARDIOGRAM TRACING: CPT

## 2024-11-19 PROCEDURE — 84484 ASSAY OF TROPONIN QUANT: CPT | Performed by: EMERGENCY MEDICINE

## 2024-11-19 PROCEDURE — 72125 CT NECK SPINE W/O DYE: CPT | Performed by: STUDENT IN AN ORGANIZED HEALTH CARE EDUCATION/TRAINING PROGRAM

## 2024-11-19 PROCEDURE — 36415 COLL VENOUS BLD VENIPUNCTURE: CPT | Performed by: EMERGENCY MEDICINE

## 2024-11-19 PROCEDURE — 87635 SARS-COV-2 COVID-19 AMP PRB: CPT | Performed by: EMERGENCY MEDICINE

## 2024-11-19 PROCEDURE — 70450 CT HEAD/BRAIN W/O DYE: CPT | Performed by: STUDENT IN AN ORGANIZED HEALTH CARE EDUCATION/TRAINING PROGRAM

## 2024-11-19 PROCEDURE — 99285 EMERGENCY DEPT VISIT HI MDM: CPT | Mod: 25

## 2024-11-19 PROCEDURE — 71045 X-RAY EXAM CHEST 1 VIEW: CPT

## 2024-11-19 PROCEDURE — 85025 COMPLETE CBC W/AUTO DIFF WBC: CPT | Performed by: EMERGENCY MEDICINE

## 2024-11-19 PROCEDURE — 80053 COMPREHEN METABOLIC PANEL: CPT | Performed by: EMERGENCY MEDICINE

## 2024-11-19 PROCEDURE — 2500000004 HC RX 250 GENERAL PHARMACY W/ HCPCS (ALT 636 FOR OP/ED): Performed by: EMERGENCY MEDICINE

## 2024-11-19 RX ORDER — LANOLIN ALCOHOL/MO/W.PET/CERES
400 CREAM (GRAM) TOPICAL ONCE
Status: COMPLETED | OUTPATIENT
Start: 2024-11-19 | End: 2024-11-19

## 2024-11-19 RX ORDER — METHYLPREDNISOLONE 4 MG/1
TABLET ORAL
Qty: 21 TABLET | Refills: 0 | Status: ON HOLD | OUTPATIENT
Start: 2024-11-19 | End: 2024-11-26

## 2024-11-19 ASSESSMENT — PAIN DESCRIPTION - DESCRIPTORS: DESCRIPTORS: ACHING

## 2024-11-19 ASSESSMENT — PAIN DESCRIPTION - FREQUENCY: FREQUENCY: CONSTANT/CONTINUOUS

## 2024-11-19 ASSESSMENT — LIFESTYLE VARIABLES
EVER HAD A DRINK FIRST THING IN THE MORNING TO STEADY YOUR NERVES TO GET RID OF A HANGOVER: NO
TOTAL SCORE: 0
EVER FELT BAD OR GUILTY ABOUT YOUR DRINKING: NO
HAVE PEOPLE ANNOYED YOU BY CRITICIZING YOUR DRINKING: NO
HAVE YOU EVER FELT YOU SHOULD CUT DOWN ON YOUR DRINKING: NO

## 2024-11-19 ASSESSMENT — PAIN SCALES - GENERAL
PAINLEVEL_OUTOF10: 0 - NO PAIN
PAINLEVEL_OUTOF10: 7
PAINLEVEL_OUTOF10: 0 - NO PAIN

## 2024-11-19 ASSESSMENT — PAIN DESCRIPTION - ONSET: ONSET: AWAKENED FROM SLEEP

## 2024-11-19 ASSESSMENT — PAIN DESCRIPTION - PROGRESSION: CLINICAL_PROGRESSION: NOT CHANGED

## 2024-11-19 ASSESSMENT — PAIN - FUNCTIONAL ASSESSMENT: PAIN_FUNCTIONAL_ASSESSMENT: 0-10

## 2024-11-19 ASSESSMENT — COLUMBIA-SUICIDE SEVERITY RATING SCALE - C-SSRS
6. HAVE YOU EVER DONE ANYTHING, STARTED TO DO ANYTHING, OR PREPARED TO DO ANYTHING TO END YOUR LIFE?: NO
2. HAVE YOU ACTUALLY HAD ANY THOUGHTS OF KILLING YOURSELF?: NO
1. IN THE PAST MONTH, HAVE YOU WISHED YOU WERE DEAD OR WISHED YOU COULD GO TO SLEEP AND NOT WAKE UP?: NO

## 2024-11-19 ASSESSMENT — PAIN DESCRIPTION - LOCATION: LOCATION: SACRUM

## 2024-11-19 ASSESSMENT — PAIN DESCRIPTION - PAIN TYPE: TYPE: ACUTE PAIN

## 2024-11-19 NOTE — DISCHARGE INSTRUCTIONS
You need to follow up with neurosurgery for the collection of fluid in you head.  It appears consistent with an old area of bleeding.  Call today for an appointment in 1-2 weeks    Do NOT take any blood thinning medications.    Follow up the nodule on your lung with your doctor.    Seek immediate medical attention if you develop: new or worsening headache, nausea, vomiting, confusion, weakness, loss of motion in your arms or legs, loss of control of your urine or stool, difficulty waking from sleep, or any new or worsening symptoms.    Have someone check on you and wake you from sleep every 2 hours for the next 24 hours to make sure that you are doing well.    Seek immediate medical attention if you develop: worsening weakness, chest pain, nausea, vomiting, numbness, tingling, excessive sweating, shortness of breath, difficulty breathing, loss of motion in your arms or legs, or any new or worsening symptoms.

## 2024-11-19 NOTE — ED PROVIDER NOTES
HPI   Chief Complaint   Patient presents with    Weakness, Gen     Pt was smoking outside. Patient stated he lost his balance and fell on mhis sacrum. Pt states pain 7/10 no LOC No blood thinners        This is a 74-year-old male who presents to the ER after a fall.  He states he was outside having a cigarette and he went to turn and his left leg gave out and caused him to fall.  He lost his balance.  He is not dizzy or lightheaded.  There is no chest pain or shortness of breath.  Is been no nausea or vomiting or diarrhea.  His left leg is chronically weak.  He states he landed on his buttocks.  No loss of conscious.  No vomiting.  He was not able to get up on his own.  He is some chronic weakness.  He is currently being treated for skin cancer on his nose with radiation.  He does have a history of diabetes.  He denies hypertension or hyperlipidemia.  He does smoke.  No drugs or alcohol.  He is allergic to penicillin and tramadol.      EXAM:  Vital signs reviewed  General:  Appears well  Alert  HEENT:  Head atraumatic, no tenderness to palpation  He does have a Band-Aid on his nose from his skin cancer treatments  Eyes normal inspection  Normal ENT inspection  NECK:  Normal inspection, no tenderness to palpation  RESPIRATORY:  Normal breath sounds  No chest wall tenderness  No respiratory distress  CVS:  Heart rate and rhythm regular  No Murmurs  ABDOMEN/GI:  Soft  Non-tender  Bowel sounds normal  FEMALE GENITAL:  []  MALE GENITAL:  []  RECTAL:  []  BACK:  Normal inspection  EXTREMITIES:  Non-Tender  He does have difficult time straight leg raising both of his legs.  States he has chronic leg weakness.  Normal appearance  NEURO:  Alert and oriented X 3  CN's normal as tested  Sensation normal  Motor normal  PSYCH:  Mood normal  Affect normal  SKIN:  Color normal  No rash  Warm  Dry                  Patient History   Past Medical History:   Diagnosis Date    Adenocarcinoma of left lung (Multi)     Cancer (Multi)      Cataracts, bilateral     Chronic anemia     Metastasis (Multi)     Mixed hyperlipidemia     Nonmelanoma skin cancer     Of face, head, right forearm    Parotid lymphadenopathy     Pedal edema     Peptic ulcer disease     Recurrent falls     Squamous cell carcinoma of nose     Tobacco use disorder     Type 2 diabetes mellitus     Vitamin B12 deficiency      Past Surgical History:   Procedure Laterality Date    OTHER SURGICAL HISTORY      Multiple Mohs procedures    PILONIDAL CYST DRAINAGE      SINUS SURGERY      SKIN BIOPSY       Family History   Problem Relation Name Age of Onset    Other (Diabetes mellitus) Mother      Other (Diabetes mellitus) Sister      Other (Liver transplant) Brother       Social History     Tobacco Use    Smoking status: Every Day     Current packs/day: 0.50     Types: Cigarettes    Smokeless tobacco: Never   Vaping Use    Vaping status: Never Used   Substance Use Topics    Alcohol use: Never    Drug use: Never       Physical Exam   ED Triage Vitals [11/19/24 0958]   Temperature Heart Rate Respirations BP   36.4 °C (97.5 °F) 88 18 105/78      Pulse Ox Temp Source Heart Rate Source Patient Position   98 % Temporal Monitor Sitting      BP Location FiO2 (%)     Right arm --       Physical Exam      ED Course & MDM   Diagnoses as of 11/19/24 1335   Fall, initial encounter   Weakness   Hygroma   Lung nodule                 No data recorded     [place] Coma Scale Score: 15 (11/19/24 1004 : Areli Caballero, RN)       NIH Stroke Scale: 0 (11/19/24 1004 : Areli Caballero, RN)                   Medical Decision Making  Since patient was too weak to get up off the ground we did do a weakness workup in addition to CT imaging.    Troponin negative.  COVID-negative.  Chemistry shows mildly low sodium.  Albumin slightly low.  Magnesium was mildly low and replaced.  Repeat troponin negative.  No leukocytosis.  Anemia is at baseline.  Chest x-ray is no acute findings.  Sacrum had no acute findings.    EKG: Normal sinus  rhythm with a ventricular rate of 67.  .  QTc 405.  No ST changes.  Repeat EKG: Normal sinus rhythm with a ventricular rate of 72.  .  QTc 416.  No ST changes.    CT: Apparent mildly increased size of nonspecific biconvexity extra-axial   simple attenuation fluid compared to 11/14/2024, age-indeterminate   subdural hygroma not excluded. No new mass effect, ventricular   effacement or midline shift. Continue attention on follow-up exam. No   acute intracranial hemorrhage or depressed calvarial fracture   otherwise.      Chronic microvascular ischemic change and parenchymal atrophy.     No acute fracture or traumatic malalignment.       Redemonstrated 1.4 cm left apical irregular spiculated nodule.   Follow-up per recommendation on recent CT chest, abdomen and pelvis     Findings were discussed with Dr. Akins from neurosurgery.  He recommended Medrol pack and having the patient follow-up with his PA in 2 weeks.  Patient is advised to schedule this appointment.  He is given a prescription for Medrol pack.    Patient also advised to follow-up the lung nodule finding with his doctor.    Patient's sister thought he should be placed in a nursing facility.  She states that he is going to fall again.  I did offer to have them be seen by physical therapy and social work and case management to get him placed in a nursing facility.  Patient adamantly refuses to be placed in a nursing facility.  He is awake and alert and oriented.  He has full comprehension and decisional capacity.    Patient is discharged home.  He is to follow-up with his doctors and neurosurgery.  He is to return to nearest ER for any new or worsening symptoms.  He is given prescription for prednisone.  He is satisfied this plan.        Procedure  Procedures     Marc Christian, DO  11/19/24 1337       Marc Christian, DO  11/19/24 1431

## 2024-11-19 NOTE — ED NOTES
Pt ambulated 200 feet without incident with walker. Attending aware.       Areli Caballero RN  11/19/24 5303

## 2024-11-19 NOTE — TELEPHONE ENCOUNTER
Referral placed to [facility] by Dr. Christian, [facility] ED, for TYRON lung nodule, noted on CT imaging today, 11/19/24. I called the patient to discuss the referral - his listed ph# actually belongs to his sister, Mayda.  She confirms Mr Ramirez is undergoing treatment at [facility] for adenocarcinoma of his left upper lung, which I see in Care Everywhere, in addition to receiving oncology care for the SCC of his nose. He and the family confirmed that no additional care is needed. His sister is planning to meet w/ his [facility] oncology team to discuss arranging additional care at home for him, as he has experienced multiple falls recently.   VICTOR M [email or web address]  [facility]

## 2024-11-20 ENCOUNTER — HOME CARE VISIT (OUTPATIENT)
Dept: HOME HEALTH SERVICES | Facility: HOME HEALTH | Age: 74
End: 2024-11-20
Payer: COMMERCIAL

## 2024-11-20 PROCEDURE — G0299 HHS/HOSPICE OF RN EA 15 MIN: HCPCS | Mod: HHH

## 2024-11-20 PROCEDURE — 169592 NO-PAY CLAIM PROCEDURE

## 2024-11-20 PROCEDURE — G0152 HHCP-SERV OF OT,EA 15 MIN: HCPCS | Mod: HHH | Performed by: OCCUPATIONAL THERAPIST

## 2024-11-20 ASSESSMENT — ENCOUNTER SYMPTOMS
DENIES PAIN: 1
LIMITED RANGE OF MOTION: 1
MUSCLE WEAKNESS: 1
APPETITE LEVEL: POOR
DENIES PAIN: 1

## 2024-11-20 ASSESSMENT — ACTIVITIES OF DAILY LIVING (ADL)
ENTERING_EXITING_HOME: MODERATE ASSIST
BATHING_CURRENT_FUNCTION: MODERATE ASSIST
DRESSING_LB_CURRENT_FUNCTION: MINIMUM ASSIST
PHYSICAL TRANSFERS ASSESSED: 1
TOILETING: STAND BY ASSIST
DRESSING_UB_CURRENT_FUNCTION: MINIMUM ASSIST
BATHING ASSESSED: 1
TOILETING: 1
AMBULATION ASSISTANCE: 1
AMBULATION ASSISTANCE: MINIMUM ASSIST
CURRENT_FUNCTION: MINIMUM ASSIST
OASIS_M1830: 03

## 2024-11-20 ASSESSMENT — LIFESTYLE VARIABLES: SMOKING_STATUS: 1

## 2024-11-22 ENCOUNTER — HOME CARE VISIT (OUTPATIENT)
Dept: HOME HEALTH SERVICES | Facility: HOME HEALTH | Age: 74
End: 2024-11-22
Payer: COMMERCIAL

## 2024-11-22 ENCOUNTER — APPOINTMENT (OUTPATIENT)
Dept: CARDIOLOGY | Facility: HOSPITAL | Age: 74
End: 2024-11-22
Payer: COMMERCIAL

## 2024-11-22 ENCOUNTER — HOSPITAL ENCOUNTER (INPATIENT)
Facility: HOSPITAL | Age: 74
End: 2024-11-22
Attending: STUDENT IN AN ORGANIZED HEALTH CARE EDUCATION/TRAINING PROGRAM | Admitting: INTERNAL MEDICINE
Payer: COMMERCIAL

## 2024-11-22 VITALS
DIASTOLIC BLOOD PRESSURE: 55 MMHG | OXYGEN SATURATION: 92 % | SYSTOLIC BLOOD PRESSURE: 115 MMHG | HEART RATE: 83 BPM | TEMPERATURE: 97.5 F

## 2024-11-22 DIAGNOSIS — R73.9 HYPERGLYCEMIA: ICD-10-CM

## 2024-11-22 DIAGNOSIS — R33.9 URINARY RETENTION: ICD-10-CM

## 2024-11-22 DIAGNOSIS — R62.7 FAILURE TO THRIVE IN ADULT: Primary | ICD-10-CM

## 2024-11-22 DIAGNOSIS — R52 PAIN: ICD-10-CM

## 2024-11-22 PROBLEM — R26.9 GAIT ABNORMALITY: Status: ACTIVE | Noted: 2024-11-22

## 2024-11-22 PROBLEM — R26.9 GAIT DISORDER: Status: ACTIVE | Noted: 2024-11-22

## 2024-11-22 LAB
ALBUMIN SERPL BCP-MCNC: 3.3 G/DL (ref 3.4–5)
ALP SERPL-CCNC: 89 U/L (ref 33–136)
ALT SERPL W P-5'-P-CCNC: 20 U/L (ref 10–52)
ANION GAP BLDV CALCULATED.4IONS-SCNC: 1 MMOL/L (ref 10–25)
ANION GAP SERPL CALC-SCNC: 12 MMOL/L (ref 10–20)
APPEARANCE UR: CLEAR
AST SERPL W P-5'-P-CCNC: 18 U/L (ref 9–39)
BACTERIA #/AREA URNS AUTO: ABNORMAL /HPF
BASE EXCESS BLDV CALC-SCNC: 10.1 MMOL/L (ref -2–3)
BASOPHILS # BLD AUTO: 0.01 X10*3/UL (ref 0–0.1)
BASOPHILS NFR BLD AUTO: 0.1 %
BILIRUB SERPL-MCNC: 0.7 MG/DL (ref 0–1.2)
BILIRUB UR STRIP.AUTO-MCNC: NEGATIVE MG/DL
BODY TEMPERATURE: ABNORMAL
BUN SERPL-MCNC: 23 MG/DL (ref 6–23)
CA-I BLDV-SCNC: 1.23 MMOL/L (ref 1.1–1.33)
CALCIUM SERPL-MCNC: 8.5 MG/DL (ref 8.6–10.3)
CHLORIDE BLDV-SCNC: 99 MMOL/L (ref 98–107)
CHLORIDE SERPL-SCNC: 94 MMOL/L (ref 98–107)
CO2 SERPL-SCNC: 29 MMOL/L (ref 21–32)
COLOR UR: YELLOW
CREAT SERPL-MCNC: 1.04 MG/DL (ref 0.5–1.3)
EGFRCR SERPLBLD CKD-EPI 2021: 75 ML/MIN/1.73M*2
EOSINOPHIL # BLD AUTO: 0 X10*3/UL (ref 0–0.4)
EOSINOPHIL NFR BLD AUTO: 0 %
ERYTHROCYTE [DISTWIDTH] IN BLOOD BY AUTOMATED COUNT: 17.6 % (ref 11.5–14.5)
GLUCOSE BLDV-MCNC: 269 MG/DL (ref 74–99)
GLUCOSE SERPL-MCNC: 265 MG/DL (ref 74–99)
GLUCOSE UR STRIP.AUTO-MCNC: ABNORMAL MG/DL
GRAN CASTS #/AREA UR COMP ASSIST: ABNORMAL /LPF
HCO3 BLDV-SCNC: 35.8 MMOL/L (ref 22–26)
HCT VFR BLD AUTO: 28 % (ref 41–52)
HCT VFR BLD EST: 28 % (ref 41–52)
HGB BLD-MCNC: 8.7 G/DL (ref 13.5–17.5)
HGB BLDV-MCNC: 9.2 G/DL (ref 13.5–17.5)
HYALINE CASTS #/AREA URNS AUTO: ABNORMAL /LPF
IMM GRANULOCYTES # BLD AUTO: 0.11 X10*3/UL (ref 0–0.5)
IMM GRANULOCYTES NFR BLD AUTO: 1.4 % (ref 0–0.9)
INHALED O2 CONCENTRATION: 31 %
KETONES UR STRIP.AUTO-MCNC: ABNORMAL MG/DL
LACTATE BLDV-SCNC: 1.4 MMOL/L (ref 0.4–2)
LEUKOCYTE ESTERASE UR QL STRIP.AUTO: NEGATIVE
LYMPHOCYTES # BLD AUTO: 0.75 X10*3/UL (ref 0.8–3)
LYMPHOCYTES NFR BLD AUTO: 9.4 %
MCH RBC QN AUTO: 27.9 PG (ref 26–34)
MCHC RBC AUTO-ENTMCNC: 31.1 G/DL (ref 32–36)
MCV RBC AUTO: 90 FL (ref 80–100)
MONOCYTES # BLD AUTO: 0.42 X10*3/UL (ref 0.05–0.8)
MONOCYTES NFR BLD AUTO: 5.3 %
MUCOUS THREADS #/AREA URNS AUTO: ABNORMAL /LPF
NEUTROPHILS # BLD AUTO: 6.68 X10*3/UL (ref 1.6–5.5)
NEUTROPHILS NFR BLD AUTO: 83.8 %
NITRITE UR QL STRIP.AUTO: NEGATIVE
NRBC BLD-RTO: 0 /100 WBCS (ref 0–0)
OXYHGB MFR BLDV: 50.2 % (ref 45–75)
PCO2 BLDV: 54 MM HG (ref 41–51)
PH BLDV: 7.43 PH (ref 7.33–7.43)
PH UR STRIP.AUTO: 5.5 [PH]
PLATELET # BLD AUTO: 230 X10*3/UL (ref 150–450)
PO2 BLDV: 37 MM HG (ref 35–45)
POTASSIUM BLDV-SCNC: 4.6 MMOL/L (ref 3.5–5.3)
POTASSIUM SERPL-SCNC: 4.1 MMOL/L (ref 3.5–5.3)
PROT SERPL-MCNC: 5.8 G/DL (ref 6.4–8.2)
PROT UR STRIP.AUTO-MCNC: ABNORMAL MG/DL
RBC # BLD AUTO: 3.12 X10*6/UL (ref 4.5–5.9)
RBC # UR STRIP.AUTO: NEGATIVE /UL
RBC #/AREA URNS AUTO: ABNORMAL /HPF
SAO2 % BLDV: 53 % (ref 45–75)
SODIUM BLDV-SCNC: 131 MMOL/L (ref 136–145)
SODIUM SERPL-SCNC: 131 MMOL/L (ref 136–145)
SP GR UR STRIP.AUTO: 1.02
UROBILINOGEN UR STRIP.AUTO-MCNC: NORMAL MG/DL
WBC # BLD AUTO: 8 X10*3/UL (ref 4.4–11.3)
WBC #/AREA URNS AUTO: ABNORMAL /HPF

## 2024-11-22 PROCEDURE — 96374 THER/PROPH/DIAG INJ IV PUSH: CPT

## 2024-11-22 PROCEDURE — 84075 ASSAY ALKALINE PHOSPHATASE: CPT | Performed by: STUDENT IN AN ORGANIZED HEALTH CARE EDUCATION/TRAINING PROGRAM

## 2024-11-22 PROCEDURE — 85025 COMPLETE CBC W/AUTO DIFF WBC: CPT | Performed by: STUDENT IN AN ORGANIZED HEALTH CARE EDUCATION/TRAINING PROGRAM

## 2024-11-22 PROCEDURE — 2500000002 HC RX 250 W HCPCS SELF ADMINISTERED DRUGS (ALT 637 FOR MEDICARE OP, ALT 636 FOR OP/ED): Performed by: PHYSICIAN ASSISTANT

## 2024-11-22 PROCEDURE — 36415 COLL VENOUS BLD VENIPUNCTURE: CPT | Performed by: STUDENT IN AN ORGANIZED HEALTH CARE EDUCATION/TRAINING PROGRAM

## 2024-11-22 PROCEDURE — G0151 HHCP-SERV OF PT,EA 15 MIN: HCPCS | Mod: HHH

## 2024-11-22 PROCEDURE — 81001 URINALYSIS AUTO W/SCOPE: CPT | Performed by: PHYSICIAN ASSISTANT

## 2024-11-22 PROCEDURE — 2500000005 HC RX 250 GENERAL PHARMACY W/O HCPCS

## 2024-11-22 PROCEDURE — 51798 US URINE CAPACITY MEASURE: CPT

## 2024-11-22 PROCEDURE — 2500000005 HC RX 250 GENERAL PHARMACY W/O HCPCS: Performed by: UROLOGY

## 2024-11-22 PROCEDURE — 2500000004 HC RX 250 GENERAL PHARMACY W/ HCPCS (ALT 636 FOR OP/ED): Performed by: PHYSICIAN ASSISTANT

## 2024-11-22 PROCEDURE — 99285 EMERGENCY DEPT VISIT HI MDM: CPT

## 2024-11-22 PROCEDURE — G0378 HOSPITAL OBSERVATION PER HR: HCPCS

## 2024-11-22 PROCEDURE — 2500000002 HC RX 250 W HCPCS SELF ADMINISTERED DRUGS (ALT 637 FOR MEDICARE OP, ALT 636 FOR OP/ED): Performed by: UROLOGY

## 2024-11-22 PROCEDURE — 36415 COLL VENOUS BLD VENIPUNCTURE: CPT | Performed by: PHYSICIAN ASSISTANT

## 2024-11-22 PROCEDURE — 84132 ASSAY OF SERUM POTASSIUM: CPT | Performed by: PHYSICIAN ASSISTANT

## 2024-11-22 PROCEDURE — 93005 ELECTROCARDIOGRAM TRACING: CPT

## 2024-11-22 PROCEDURE — 99221 1ST HOSP IP/OBS SF/LOW 40: CPT | Performed by: NURSE PRACTITIONER

## 2024-11-22 RX ORDER — LIDOCAINE HYDROCHLORIDE 20 MG/ML
1 JELLY TOPICAL ONCE
Status: COMPLETED | OUTPATIENT
Start: 2024-11-22 | End: 2024-11-22

## 2024-11-22 RX ORDER — DEXTROSE 50 % IN WATER (D50W) INTRAVENOUS SYRINGE
12.5
Status: DISCONTINUED | OUTPATIENT
Start: 2024-11-22 | End: 2024-11-29 | Stop reason: HOSPADM

## 2024-11-22 RX ORDER — LIDOCAINE HYDROCHLORIDE 20 MG/ML
JELLY TOPICAL
Status: COMPLETED
Start: 2024-11-22 | End: 2024-11-22

## 2024-11-22 RX ORDER — SODIUM CHLORIDE 0.9 G/100ML
3000 IRRIGANT IRRIGATION CONTINUOUS
Status: DISCONTINUED | OUTPATIENT
Start: 2024-11-22 | End: 2024-11-29 | Stop reason: HOSPADM

## 2024-11-22 RX ORDER — INSULIN LISPRO 100 [IU]/ML
0-5 INJECTION, SOLUTION INTRAVENOUS; SUBCUTANEOUS
Status: DISCONTINUED | OUTPATIENT
Start: 2024-11-23 | End: 2024-11-29 | Stop reason: HOSPADM

## 2024-11-22 RX ORDER — DEXTROSE 50 % IN WATER (D50W) INTRAVENOUS SYRINGE
25
Status: DISCONTINUED | OUTPATIENT
Start: 2024-11-22 | End: 2024-11-29 | Stop reason: HOSPADM

## 2024-11-22 RX ORDER — TAMSULOSIN HYDROCHLORIDE 0.4 MG/1
0.4 CAPSULE ORAL EVERY EVENING
Status: DISCONTINUED | OUTPATIENT
Start: 2024-11-22 | End: 2024-11-29 | Stop reason: HOSPADM

## 2024-11-22 SDOH — SOCIAL STABILITY: SOCIAL INSECURITY: ARE THERE ANY APPARENT SIGNS OF INJURIES/BEHAVIORS THAT COULD BE RELATED TO ABUSE/NEGLECT?: NO

## 2024-11-22 SDOH — ECONOMIC STABILITY: FOOD INSECURITY: WITHIN THE PAST 12 MONTHS, YOU WORRIED THAT YOUR FOOD WOULD RUN OUT BEFORE YOU GOT THE MONEY TO BUY MORE.: NEVER TRUE

## 2024-11-22 SDOH — SOCIAL STABILITY: SOCIAL INSECURITY: WITHIN THE LAST YEAR, HAVE YOU BEEN AFRAID OF YOUR PARTNER OR EX-PARTNER?: NO

## 2024-11-22 SDOH — SOCIAL STABILITY: SOCIAL INSECURITY: DO YOU FEEL UNSAFE GOING BACK TO THE PLACE WHERE YOU ARE LIVING?: NO

## 2024-11-22 SDOH — SOCIAL STABILITY: SOCIAL INSECURITY: HAS ANYONE EVER THREATENED TO HURT YOUR FAMILY OR YOUR PETS?: NO

## 2024-11-22 SDOH — SOCIAL STABILITY: SOCIAL INSECURITY: ABUSE: ADULT

## 2024-11-22 SDOH — SOCIAL STABILITY: SOCIAL INSECURITY: WERE YOU ABLE TO COMPLETE ALL THE BEHAVIORAL HEALTH SCREENINGS?: YES

## 2024-11-22 SDOH — SOCIAL STABILITY: SOCIAL INSECURITY: HAVE YOU HAD THOUGHTS OF HARMING ANYONE ELSE?: NO

## 2024-11-22 SDOH — SOCIAL STABILITY: SOCIAL INSECURITY: DO YOU FEEL ANYONE HAS EXPLOITED OR TAKEN ADVANTAGE OF YOU FINANCIALLY OR OF YOUR PERSONAL PROPERTY?: NO

## 2024-11-22 SDOH — SOCIAL STABILITY: SOCIAL INSECURITY: DOES ANYONE TRY TO KEEP YOU FROM HAVING/CONTACTING OTHER FRIENDS OR DOING THINGS OUTSIDE YOUR HOME?: NO

## 2024-11-22 SDOH — SOCIAL STABILITY: SOCIAL INSECURITY: WITHIN THE LAST YEAR, HAVE YOU BEEN HUMILIATED OR EMOTIONALLY ABUSED IN OTHER WAYS BY YOUR PARTNER OR EX-PARTNER?: NO

## 2024-11-22 SDOH — ECONOMIC STABILITY: INCOME INSECURITY: IN THE PAST 12 MONTHS HAS THE ELECTRIC, GAS, OIL, OR WATER COMPANY THREATENED TO SHUT OFF SERVICES IN YOUR HOME?: NO

## 2024-11-22 SDOH — ECONOMIC STABILITY: FOOD INSECURITY: WITHIN THE PAST 12 MONTHS, THE FOOD YOU BOUGHT JUST DIDN'T LAST AND YOU DIDN'T HAVE MONEY TO GET MORE.: NEVER TRUE

## 2024-11-22 SDOH — SOCIAL STABILITY: SOCIAL INSECURITY: ARE YOU OR HAVE YOU BEEN THREATENED OR ABUSED PHYSICALLY, EMOTIONALLY, OR SEXUALLY BY ANYONE?: NO

## 2024-11-22 SDOH — SOCIAL STABILITY: SOCIAL INSECURITY: HAVE YOU HAD ANY THOUGHTS OF HARMING ANYONE ELSE?: NO

## 2024-11-22 ASSESSMENT — ACTIVITIES OF DAILY LIVING (ADL)
CURRENT_FUNCTION: ONE PERSON
FEEDING YOURSELF: NEEDS ASSISTANCE
PATIENT'S MEMORY ADEQUATE TO SAFELY COMPLETE DAILY ACTIVITIES?: YES
JUDGMENT_ADEQUATE_SAFELY_COMPLETE_DAILY_ACTIVITIES: YES
AMBULATION ASSISTANCE: ONE PERSON
LACK_OF_TRANSPORTATION: NO
AMBULATION_DISTANCE/DURATION_TOLERATED: 25 FT
BATHING: NEEDS ASSISTANCE
AMBULATION ASSISTANCE ON FLAT SURFACES: 1
WALKS IN HOME: NEEDS ASSISTANCE
GROOMING: NEEDS ASSISTANCE
HEARING - LEFT EAR: FUNCTIONAL
TOILETING: NEEDS ASSISTANCE
DRESSING YOURSELF: NEEDS ASSISTANCE
SHOPPING_REQUIRES_ASSISTANCE: 1
DRESSING_REQUIRES_ASSISTANCE: 1
LAUNDRY_REQUIRES_ASSISTANCE: 1
HEARING - RIGHT EAR: FUNCTIONAL
ADEQUATE_TO_COMPLETE_ADL: YES

## 2024-11-22 ASSESSMENT — ENCOUNTER SYMPTOMS
UNEXPECTED WEIGHT CHANGE: 1
RHINORRHEA: 0
DENIES PAIN: 1
EYE PAIN: 0
DIZZINESS: 0
NECK PAIN: 0
COUGH: 0
ABDOMINAL PAIN: 0
EYE DISCHARGE: 0
CONFUSION: 0
CHILLS: 0
FATIGUE: 1
PERSON REPORTING PAIN: PATIENT
MUSCLE WEAKNESS: 1
FEVER: 0
ACTIVITY CHANGE: 1
EYE REDNESS: 0
WHEEZING: 0
APPETITE CHANGE: 0
DIAPHORESIS: 0
BLOOD IN STOOL: 0
VOMITING: 0
CONSTIPATION: 0
POLYPHAGIA: 0
WOUND: 1
WEAKNESS: 1
DIFFICULTY URINATING: 1
NAUSEA: 0
PALPITATIONS: 0
DIARRHEA: 1
SHORTNESS OF BREATH: 0
FLANK PAIN: 0

## 2024-11-22 ASSESSMENT — COLUMBIA-SUICIDE SEVERITY RATING SCALE - C-SSRS
2. HAVE YOU ACTUALLY HAD ANY THOUGHTS OF KILLING YOURSELF?: NO
1. IN THE PAST MONTH, HAVE YOU WISHED YOU WERE DEAD OR WISHED YOU COULD GO TO SLEEP AND NOT WAKE UP?: NO
6. HAVE YOU EVER DONE ANYTHING, STARTED TO DO ANYTHING, OR PREPARED TO DO ANYTHING TO END YOUR LIFE?: NO

## 2024-11-22 ASSESSMENT — PAIN SCALES - GENERAL
PAINLEVEL_OUTOF10: 0 - NO PAIN

## 2024-11-22 ASSESSMENT — COGNITIVE AND FUNCTIONAL STATUS - GENERAL
TURNING FROM BACK TO SIDE WHILE IN FLAT BAD: A LITTLE
MOVING FROM LYING ON BACK TO SITTING ON SIDE OF FLAT BED WITH BEDRAILS: A LITTLE
HELP NEEDED FOR BATHING: A LITTLE
EATING MEALS: A LITTLE
DAILY ACTIVITIY SCORE: 18
CLIMB 3 TO 5 STEPS WITH RAILING: A LITTLE
MOVING TO AND FROM BED TO CHAIR: A LITTLE
DRESSING REGULAR LOWER BODY CLOTHING: A LITTLE
PATIENT BASELINE BEDBOUND: NO
PERSONAL GROOMING: A LITTLE
TOILETING: A LITTLE
WALKING IN HOSPITAL ROOM: A LITTLE
DRESSING REGULAR UPPER BODY CLOTHING: A LITTLE
MOBILITY SCORE: 18
STANDING UP FROM CHAIR USING ARMS: A LITTLE

## 2024-11-22 ASSESSMENT — LIFESTYLE VARIABLES
AUDIT-C TOTAL SCORE: 0
AUDIT-C TOTAL SCORE: 0
HOW OFTEN DO YOU HAVE A DRINK CONTAINING ALCOHOL: NEVER
EVER HAD A DRINK FIRST THING IN THE MORNING TO STEADY YOUR NERVES TO GET RID OF A HANGOVER: NO
EVER FELT BAD OR GUILTY ABOUT YOUR DRINKING: NO
HOW OFTEN DO YOU HAVE 6 OR MORE DRINKS ON ONE OCCASION: NEVER
HAVE YOU EVER FELT YOU SHOULD CUT DOWN ON YOUR DRINKING: NO
SKIP TO QUESTIONS 9-10: 1
TOTAL SCORE: 0
HOW MANY STANDARD DRINKS CONTAINING ALCOHOL DO YOU HAVE ON A TYPICAL DAY: PATIENT DOES NOT DRINK
HAVE PEOPLE ANNOYED YOU BY CRITICIZING YOUR DRINKING: NO

## 2024-11-22 ASSESSMENT — PAIN - FUNCTIONAL ASSESSMENT
PAIN_FUNCTIONAL_ASSESSMENT: 0-10

## 2024-11-22 ASSESSMENT — PATIENT HEALTH QUESTIONNAIRE - PHQ9
SUM OF ALL RESPONSES TO PHQ9 QUESTIONS 1 & 2: 0
2. FEELING DOWN, DEPRESSED OR HOPELESS: NOT AT ALL
1. LITTLE INTEREST OR PLEASURE IN DOING THINGS: NOT AT ALL

## 2024-11-22 NOTE — ASSESSMENT & PLAN NOTE
Consult PT/OT and case management for skilled nursing placement.  Patient family prefer [facility].

## 2024-11-22 NOTE — ED PROVIDER NOTES
Emergency Department Provider Note        History of Present Illness     History provided by: Patient and sister  Limitations to History: None  External Records Reviewed with Brief Summary: None    HPI:  Josias Ramirez is a 74 y.o. male who presents today for failure to thrive, his case management nurse recommended admission for placement.  Patient was just here over the weekend for the same thing, his sister wanted him to admitted but at that time the patient was adamant that he wanted to go home.  Since then he has had multiple falls, most recent fall was today, patient denies hitting his head losing consciousness, denies headache blood thinner use or vomiting.  He has not injured himself, nothing hurts from the falls.  Patient states is because his left leg keeps giving out.  His lower extremities are chronically swollen, patient states actually little bit better than it was over the weekend, there is some seeping from the posterior left calf.  Patient denies any associated pain, denies any trauma to the leg.  Denies any chest pain shortness of breath, fevers, abdominal pain, urinary symptoms.    Physical Exam   Triage vitals:  T 36 °C (96.8 °F)  HR 81  /64  RR 18  O2 98 % None (Room air)    Physical Exam  Vitals and nursing note reviewed.   Constitutional:       General: He is not in acute distress.     Appearance: Normal appearance. He is not toxic-appearing.   HENT:      Head: Normocephalic and atraumatic.      Nose: Nose normal.      Comments: Band-Aid to nose, has SCC.  Eyes:      Extraocular Movements: Extraocular movements intact.   Cardiovascular:      Rate and Rhythm: Normal rate and regular rhythm.   Pulmonary:      Effort: Pulmonary effort is normal.      Breath sounds: Normal breath sounds.   Abdominal:      Palpations: Abdomen is soft.      Tenderness: There is no abdominal tenderness. There is no guarding.      Comments: Has Manning leg bag attached to the right lower quadrant, patient states  that he removed his own Manning catheter on Sunday.  Has been voiding since then but states he feels that he is not fully voiding.    Musculoskeletal:         General: Normal range of motion.      Cervical back: Normal range of motion and neck supple.      Comments: Bilateral lower extremity pitting edema is present, clear leakage from the posterior left calf without any obvious wound.  Left foot cool to touch (patient's sock was wet) able to obtain PT DP pulses via Doppler.   Skin:     General: Skin is warm and dry.   Neurological:      General: No focal deficit present.      Mental Status: He is alert.   Psychiatric:         Mood and Affect: Mood normal.         Thought Content: Thought content normal.        No orders to display     Labs Reviewed   CBC WITH AUTO DIFFERENTIAL - Abnormal       Result Value    WBC 8.0      nRBC 0.0      RBC 3.12 (*)     Hemoglobin 8.7 (*)     Hematocrit 28.0 (*)     MCV 90      MCH 27.9      MCHC 31.1 (*)     RDW 17.6 (*)     Platelets 230      Neutrophils % 83.8      Immature Granulocytes %, Automated 1.4 (*)     Lymphocytes % 9.4      Monocytes % 5.3      Eosinophils % 0.0      Basophils % 0.1      Neutrophils Absolute 6.68 (*)     Immature Granulocytes Absolute, Automated 0.11      Lymphocytes Absolute 0.75 (*)     Monocytes Absolute 0.42      Eosinophils Absolute 0.00      Basophils Absolute 0.01     COMPREHENSIVE METABOLIC PANEL - Abnormal    Glucose 265 (*)     Sodium 131 (*)     Potassium 4.1      Chloride 94 (*)     Bicarbonate 29      Anion Gap 12      Urea Nitrogen 23      Creatinine 1.04      eGFR 75      Calcium 8.5 (*)     Albumin 3.3 (*)     Alkaline Phosphatase 89      Total Protein 5.8 (*)     AST 18      Bilirubin, Total 0.7      ALT 20     BLOOD GAS VENOUS FULL PANEL - Abnormal    POCT pH, Venous 7.43      POCT pCO2, Venous 54 (*)     POCT pO2, Venous 37      POCT SO2, Venous 53      POCT Oxy Hemoglobin, Venous 50.2      POCT Hematocrit Calculated, Venous 28.0  (*)     POCT Sodium, Venous 131 (*)     POCT Potassium, Venous 4.6      POCT Chloride, Venous 99      POCT Ionized Calicum, Venous 1.23      POCT Glucose, Venous 269 (*)     POCT Lactate, Venous 1.4      POCT Base Excess, Venous 10.1 (*)     POCT HCO3 Calculated, Venous 35.8 (*)     POCT Hemoglobin, Venous 9.2 (*)     POCT Anion Gap, Venous 1.0 (*)     Patient Temperature        FiO2 31     URINALYSIS WITH REFLEX CULTURE AND MICROSCOPIC - Abnormal    Color, Urine Yellow      Appearance, Urine Clear      Specific Gravity, Urine 1.019      pH, Urine 5.5      Protein, Urine 20 (TRACE)      Glucose, Urine 500 (3+) (*)     Blood, Urine NEGATIVE      Ketones, Urine TRACE (*)     Bilirubin, Urine NEGATIVE      Urobilinogen, Urine Normal      Nitrite, Urine NEGATIVE      Leukocyte Esterase, Urine NEGATIVE     URINALYSIS MICROSCOPIC WITH REFLEX CULTURE - Abnormal    WBC, Urine 1-5      RBC, Urine 6-10 (*)     Bacteria, Urine 1+ (*)     Mucus, Urine FEW      Hyaline Casts, Urine 3+ (*)     Fine Granular Casts, Urine 2+ (*)    URINALYSIS WITH REFLEX CULTURE AND MICROSCOPIC    Narrative:     The following orders were created for panel order Urinalysis with Reflex Culture and Microscopic.  Procedure                               Abnormality         Status                     ---------                               -----------         ------                     Urinalysis with Reflex C...[[id number]]  Abnormal            Final result               Extra Urine Gray Tube[[id number]]                                                         Please view results for these tests on the individual orders.     ED Course as of 11/22/24 1858 Fri Nov 22, 2024 1548 Patient did not take januvia today, forgot. Ordered here.  [MK]   1853 Spoke with urology, recommended keeping finney out for now and will see him in the hospital [MK]      ED Course User Index  [MK] Lucila Bailey PA-C         Diagnoses as of 11/22/24 1858   Failure to thrive in  adult   Hyperglycemia   Urinary retention           Medical Decision Making & ED Course   Medical Decision Makin y.o. male presents today for frequent falls failure to thrive, his sister is accompanying him today, he is finally ready to be admitted for placement, we did involve social work, she recommended PT OT and precertification, patient will need to be admitted for this.  Patient did have a placed postvoid residual of 350, nursing attempted to obtain a Finney catheter, did have a lot of blood, at which point we attempted to irrigate, three-way Finney was attempted to be placed however patient was having a lot of pain and discomfort with this, Finney was removed and urology was consulted, spoke with Dr. Lyons. ,  Who recommended keeping the Finney out for now and he will see him in the hospital.  Patient is hyperglycemic, not in DKA, we did give him his home Januvia which she forgot to take this morning.  Will be admitted to medicine for further evaluation management.  ----      Differential diagnoses considered include but are not limited to: failure to thrive, uti, retention, electrolyte disturbance     Social Determinants of Health which Significantly Impact Care: None identified     EKG Independent Interpretation: EKG not obtained    Independent Result Review and Interpretation: Relevant laboratory and radiographic results were reviewed and independently interpreted by myself.  As necessary, they are commented on in the ED Course.    Chronic conditions affecting the patient's care: As documented above in [facility]    The patient was discussed with the following consultants/services: Dr. Lyons urology    Care Considerations: As documented above in [facility]    ED Course:  ED Course as of 24   1548 Patient did not take januvia today, forgot. Ordered here.  [MK]    Spoke with urology, recommended keeping finney out for now and will see him in the hospital [MK]      ED Course User  Index  [MK] Lucila Bailey PA-C         Diagnoses as of 11/22/24 1858   Failure to thrive in adult   Hyperglycemia   Urinary retention     Disposition   As a result of their workup, the patient will require admission to the hospital.  The patient was informed of his diagnosis.  The patient was given the opportunity to ask questions and I answered them. The patient agreed to be admitted to the hospital.    Procedures   Procedures    This was a shared visit with an ED attending.  The patient was seen and discussed with the ED attending    Lucila Bailey PA-C  Emergency Medicine       Lucila Bailey PA-C  11/22/24 1858

## 2024-11-22 NOTE — H&P
"History Of Present Illness  Josias Ramirez is a 74 y.o. 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     He was recently hospitalized at [facility] on 1115 after falling on 1112.  He was recommended to go to rehab but decided he would prefer to go home and was discharged 11/16.  He fell again on 1119 but also decided he wanted to go home with his daughter.  He has not been able to manage and has fallen every day since he is left the hospital.  At this point he is agreeable to come in for rehab placement.  He tells me his legs are heavy and he has hard time walking thus causing him to fall.  He denies dizziness and has not hit his head or been syncopal.  He denies shortness of breath or chest pain.  He was discharged with a Manning catheter and tells me \"it filled up and therefore I pulled it out\".  He has had difficulty urinating since then.     In the emergency room he is retaining urine therefore Manning catheter will be replaced.  Chest x-ray was clear on 1119 and lungs are relatively clear sounding but diminished.  BUN is 23, creatinine 1.09, potassium 4.1, sodium chronically low at 131.  Hemoglobin 8.7.      Past Medical History  He has a past medical history of Adenocarcinoma of left lung (Multi), Cancer (Multi), Cataracts, bilateral, Chronic anemia, Metastasis (Multi), Mixed hyperlipidemia, Nonmelanoma skin cancer, Parotid lymphadenopathy, Pedal edema, Peptic ulcer disease, Recurrent falls, Squamous cell carcinoma of nose, Tobacco use " "disorder, Type 2 diabetes mellitus, and Vitamin B12 deficiency.    Surgical History  He has a past surgical history that includes Sinus surgery; Pilonidal cyst drainage; Skin biopsy; and Other surgical history.     Social History  He reports that he has been smoking cigarettes. He has never used smokeless tobacco. He reports that he does not drink alcohol and does not use drugs.    Family History  Family History   Problem Relation Name Age of Onset    Other (Diabetes mellitus) Mother      Other (Diabetes mellitus) Sister      Other (Liver transplant) Brother          Allergies  Penicillins and Tramadol    Review of Systems   Constitutional:  Positive for activity change, fatigue and unexpected weight change. Negative for appetite change, chills, diaphoresis and fever.   HENT:  Positive for mouth sores. Negative for congestion and rhinorrhea.    Eyes:  Negative for pain, discharge and redness.   Respiratory:  Negative for cough, shortness of breath and wheezing.    Cardiovascular:  Positive for leg swelling. Negative for chest pain and palpitations.   Gastrointestinal:  Positive for diarrhea. Negative for abdominal pain, blood in stool, constipation, nausea and vomiting.        Loose stool 2x/day ,nonbloody, poor appetite   Endocrine: Negative for polyphagia.   Genitourinary:  Positive for difficulty urinating. Negative for flank pain, penile swelling and testicular pain.        Dc with finney catheter that he pulled out accidentally- tells me \"it filled up\"   Musculoskeletal:  Positive for gait problem. Negative for neck pain.   Skin:  Positive for wound.   Neurological:  Positive for weakness. Negative for dizziness and syncope.   Psychiatric/Behavioral:  Negative for confusion.         Physical Exam  Vitals reviewed.   Constitutional:       General: He is not in acute distress.     Appearance: He is ill-appearing.   HENT:      Head: Normocephalic.      Right Ear: External ear normal.      Left Ear: External ear " "normal.      Nose:      Comments: Skin cancer on his nose with bandaid dressing     Mouth/Throat:      Mouth: Mucous membranes are dry.      Pharynx: Oropharynx is clear. No posterior oropharyngeal erythema.   Eyes:      General:         Right eye: No discharge.         Left eye: No discharge.      Extraocular Movements: Extraocular movements intact.      Pupils: Pupils are equal, round, and reactive to light.   Cardiovascular:      Rate and Rhythm: Normal rate and regular rhythm.      Heart sounds: Normal heart sounds. No murmur heard.     Comments: Pedal pulses weak  Pulmonary:      Effort: No respiratory distress.      Breath sounds: No rhonchi.      Comments: Lungs sounds diminshed  Abdominal:      General: There is distension.      Palpations: Abdomen is soft.      Tenderness: There is no abdominal tenderness. There is no right CVA tenderness, left CVA tenderness, guarding or rebound.      Comments: Mild abd distention, abd soft, nontender   Musculoskeletal:         General: Normal range of motion.      Cervical back: Normal range of motion and neck supple. No tenderness.      Right lower leg: Edema present.      Left lower leg: Edema present.      Comments: BLE weakness   Skin:     General: Skin is warm and dry.      Findings: Lesion present.      Comments: Lesion on nares with bandaid dressing   Neurological:      Mental Status: He is alert and oriented to person, place, and time.      Motor: Weakness present.      Comments: Somewhat forgetful, nonfocal weakness   Psychiatric:         Mood and Affect: Mood normal.         Behavior: Behavior normal.         Last Recorded Vitals  Blood pressure 155/65, pulse 82, temperature 36 °C (96.8 °F), temperature source Temporal, resp. rate 16, height 1.803 m (5' 11\"), weight 59.7 kg (131 lb 9.8 oz), SpO2 99%.  Visit Vitals  /65 (BP Location: Right arm, Patient Position: Sitting)   Pulse 82   Temp 36 °C (96.8 °F) (Temporal)   Resp 16   Ht 1.803 m (5' 11\")   Wt 59.7 " kg (131 lb 9.8 oz)   SpO2 99%   BMI 18.36 kg/m²   Smoking Status Every Day   BSA 1.73 m²     Weight: 59.7 kg (131 lb 9.8 oz)   Pain Assessment: 0-10  0-10 (Numeric) Pain Score: 0 - No pain        Relevant Results  Results for orders placed or performed during the hospital encounter of 11/22/24 (from the past 24 hours)   CBC with Differential   Result Value Ref Range    WBC 8.0 4.4 - 11.3 x10*3/uL    nRBC 0.0 0.0 - 0.0 /100 WBCs    RBC 3.12 (L) 4.50 - 5.90 x10*6/uL    Hemoglobin 8.7 (L) 13.5 - 17.5 g/dL    Hematocrit 28.0 (L) 41.0 - 52.0 %    MCV 90 80 - 100 fL    MCH 27.9 26.0 - 34.0 pg    MCHC 31.1 (L) 32.0 - 36.0 g/dL    RDW 17.6 (H) 11.5 - 14.5 %    Platelets 230 150 - 450 x10*3/uL    Neutrophils % 83.8 40.0 - 80.0 %    Immature Granulocytes %, Automated 1.4 (H) 0.0 - 0.9 %    Lymphocytes % 9.4 13.0 - 44.0 %    Monocytes % 5.3 2.0 - 10.0 %    Eosinophils % 0.0 0.0 - 6.0 %    Basophils % 0.1 0.0 - 2.0 %    Neutrophils Absolute 6.68 (H) 1.60 - 5.50 x10*3/uL    Immature Granulocytes Absolute, Automated 0.11 0.00 - 0.50 x10*3/uL    Lymphocytes Absolute 0.75 (L) 0.80 - 3.00 x10*3/uL    Monocytes Absolute 0.42 0.05 - 0.80 x10*3/uL    Eosinophils Absolute 0.00 0.00 - 0.40 x10*3/uL    Basophils Absolute 0.01 0.00 - 0.10 x10*3/uL   Comprehensive Metabolic Panel   Result Value Ref Range    Glucose 265 (H) 74 - 99 mg/dL    Sodium 131 (L) 136 - 145 mmol/L    Potassium 4.1 3.5 - 5.3 mmol/L    Chloride 94 (L) 98 - 107 mmol/L    Bicarbonate 29 21 - 32 mmol/L    Anion Gap 12 10 - 20 mmol/L    Urea Nitrogen 23 6 - 23 mg/dL    Creatinine 1.04 0.50 - 1.30 mg/dL    eGFR 75 >60 mL/min/1.73m*2    Calcium 8.5 (L) 8.6 - 10.3 mg/dL    Albumin 3.3 (L) 3.4 - 5.0 g/dL    Alkaline Phosphatase 89 33 - 136 U/L    Total Protein 5.8 (L) 6.4 - 8.2 g/dL    AST 18 9 - 39 U/L    Bilirubin, Total 0.7 0.0 - 1.2 mg/dL    ALT 20 10 - 52 U/L   BLOOD GAS VENOUS FULL PANEL   Result Value Ref Range    POCT pH, Venous 7.43 7.33 - 7.43 pH    POCT pCO2, Venous  54 (H) 41 - 51 mm Hg    POCT pO2, Venous 37 35 - 45 mm Hg    POCT SO2, Venous 53 45 - 75 %    POCT Oxy Hemoglobin, Venous 50.2 45.0 - 75.0 %    POCT Hematocrit Calculated, Venous 28.0 (L) 41.0 - 52.0 %    POCT Sodium, Venous 131 (L) 136 - 145 mmol/L    POCT Potassium, Venous 4.6 3.5 - 5.3 mmol/L    POCT Chloride, Venous 99 98 - 107 mmol/L    POCT Ionized Calicum, Venous 1.23 1.10 - 1.33 mmol/L    POCT Glucose, Venous 269 (H) 74 - 99 mg/dL    POCT Lactate, Venous 1.4 0.4 - 2.0 mmol/L    POCT Base Excess, Venous 10.1 (H) -2.0 - 3.0 mmol/L    POCT HCO3 Calculated, Venous 35.8 (H) 22.0 - 26.0 mmol/L    POCT Hemoglobin, Venous 9.2 (L) 13.5 - 17.5 g/dL    POCT Anion Gap, Venous 1.0 (L) 10.0 - 25.0 mmol/L    Patient Temperature      FiO2 31 %   Urinalysis with Reflex Culture and Microscopic   Result Value Ref Range    Color, Urine Yellow Light-Yellow, Yellow, Dark-Yellow    Appearance, Urine Clear Clear    Specific Gravity, Urine 1.019 1.005 - 1.035    pH, Urine 5.5 5.0, 5.5, 6.0, 6.5, 7.0, 7.5, 8.0    Protein, Urine 20 (TRACE) NEGATIVE, 10 (TRACE), 20 (TRACE) mg/dL    Glucose, Urine 500 (3+) (A) Normal mg/dL    Blood, Urine NEGATIVE NEGATIVE    Ketones, Urine TRACE (A) NEGATIVE mg/dL    Bilirubin, Urine NEGATIVE NEGATIVE    Urobilinogen, Urine Normal Normal mg/dL    Nitrite, Urine NEGATIVE NEGATIVE    Leukocyte Esterase, Urine NEGATIVE NEGATIVE   Urinalysis Microscopic   Result Value Ref Range    WBC, Urine 1-5 1-5, NONE /HPF    RBC, Urine 6-10 (A) NONE, 1-2, 3-5 /HPF    Bacteria, Urine 1+ (A) NONE SEEN /HPF    Mucus, Urine FEW Reference range not established. /LPF    Hyaline Casts, Urine 3+ (A) NONE /LPF    Fine Granular Casts, Urine 2+ (A) NONE /LPF      ECG 12 lead    Result Date: 11/19/2024  Normal sinus rhythm Normal ECG When compared with ECG of 19-NOV-2024 11:14, (unconfirmed) Borderline criteria for Inferior infarct are no longer Present Non-specific change in ST segment in Inferior leads T wave inversion no  longer evident in Inferior leads    ECG 12 lead    Result Date: 11/19/2024  Normal sinus rhythm Low voltage QRS Possible Inferior infarct , age undetermined Abnormal ECG When compared with ECG of 03-NOV-2024 19:11, QRS axis Shifted right Criteria for Septal infarct are no longer Present T wave inversion more evident in Inferior leads    XR sacrum coccyx 2+ views    Result Date: 11/19/2024  Interpreted By:  Mason Angulo, STUDY: XR SACRUM COCCYX 2+ VIEWS   INDICATION: Signs/Symptoms:fall, pain.   COMPARISON: None   ACCESSION NUMBER(S): [id number]   ORDERING CLINICIAN: ELIDA SCANLON   FINDINGS: No evidence of sacral or coccygeal fracture. Alignment normal.           Unremarkable radiographs sacrum and coccyx.   Signed by: Mason Angulo 11/19/2024 11:13 AM Dictation workstation:   [id number]    XR chest 1 view    Result Date: 11/19/2024  Interpreted By:  Mason Angulo, STUDY: XR CHEST 1 VIEW   INDICATION: Signs/Symptoms:Chest Pain.   COMPARISON: November 3   ACCESSION NUMBER(S): [id number]   ORDERING CLINICIAN: ELIDA SCANLON   FINDINGS: No consolidation, effusion, edema, or pneumothorax. Heart size within normal limits with athero sclerotic aorta.       No evidence of acute intrathoracic abnormality   Signed by: Mason Angulo 11/19/2024 11:12 AM Dictation workstation:   [id number]    CT cervical spine wo IV contrast    Result Date: 11/19/2024  Interpreted By:  Saanz Rosado, STUDY: CT CERVICAL SPINE WO IV CONTRAST;  11/19/2024 10:33 am   INDICATION: Signs/Symptoms:fall, injury.   COMPARISON: CT C-spine, CT CAP 11/14/2024.   ACCESSION NUMBER(S): [id number]   ORDERING CLINICIAN: ELIDA SCANLON   TECHNIQUE: Thin section axial images were obtained from the skull base down through the thoracic inlet. Sagittal and coronal reconstruction images were generated. Soft tissue, lung, and bone windows were reviewed.   FINDINGS: Prevertebral/Paraspinal Soft Tissues/Lung Apices: Redemonstrated 1.4 cm left apical irregular  spiculated nodular opacity.   CERVICAL SPINE: Hardware: None. Fracture: No acute fracture. Vertebral Body Heights: Maintained. Alignment: No traumatic listhesis. Spinal canal and neural foramina: Multilevel degenerative disc disease, most pronounced at C3-C4 with severe right-sided neural foraminal narrowing. Extensive ossification of posterior longitudinal ligament with high-grade canal stenosis at C3-C4 and C6-C7.       No acute fracture or traumatic malalignment.   Redemonstrated 1.4 cm left apical irregular spiculated nodule. Follow-up per recommendation on recent CT chest, abdomen and pelvis 11/14/2024.   MACRO None   Signed by: Sanaz Rosado 11/19/2024 11:10 AM Dictation workstation:   [id number]    CT head wo IV contrast    Result Date: 11/19/2024  Interpreted By:  Sanaz Rosado, STUDY: CT HEAD WO IV CONTRAST;  11/19/2024 10:33 am   INDICATION: Signs/Symptoms:fall, injury.   COMPARISON: CT head 11/14/2024.   ACCESSION NUMBER(S): [id number]   ORDERING CLINICIAN: ELIDA SCANLON   TECHNIQUE: Noncontrast axial CT scan of head was performed.   FINDINGS: Parenchyma: No intracranial hemorrhage. The grey-white differentiation is intact. No mass effect or midline shift. Patchy periventricular white matter hypodensities, likely chronic microvascular ischemic change. Moderate parenchymal atrophy.   CSF Spaces: The ventricles, sulci and basal cisterns are proportional to degree of parenchymal volume loss.   Extra-Axial Fluid: Apparent increased size of simple attenuation fluid predominantly along the bilateral frontoparietal convexities measuring up to 8 mm on the right, previously 6 mm on 11/14/2024.   Calvarium: No acute depressed fracture.   Paranasal sinuses: Moderate ethmoid and sphenoid sinus mucosal thickening, new compared to 11/14/2024.   Mastoids: Clear.   Orbits: No acute abnormality.   Soft tissues: No acute abnormality.       Apparent mildly increased size of nonspecific biconvexity extra-axial simple  attenuation fluid compared to 11/14/2024, age-indeterminate subdural hygroma not excluded. No new mass effect, ventricular effacement or midline shift. Continue attention on follow-up exam. No acute intracranial hemorrhage or depressed calvarial fracture otherwise.   Chronic microvascular ischemic change and parenchymal atrophy.   MACRO None   Signed by: Sanaz Alonzo 11/19/2024 11:03 AM Dictation workstation:   [id number]    XR ankle left 3+ views    Result Date: 11/15/2024  STUDY: Ankle Radiographs;  11/15/2024  1:18AM INDICATION: Left ankle pain and swelling. COMPARISON: None Available. ACCESSION NUMBER(S): [id number] ORDERING CLINICIAN: NIKKI MARCELINO TECHNIQUE:  Three view(s) of the left ankle. FINDINGS:  There is no displaced fracture.  The alignment is anatomic. Moderate-sized Achilles enthesophyte is noted.  There is extensive soft tissue swelling surrounding the ankle.    No acute osseous abnormality identified. Extensive soft tissue swelling surrounding the ankle. Signed by Oz Dial    CT cervical spine wo IV contrast    Result Date: 11/14/2024  Interpreted By:  Kameron Quiros, STUDY: CT CERVICAL SPINE WO IV CONTRAST;  11/14/2024 10:58 pm   INDICATION: Signs/Symptoms:Fall.   COMPARISON: None.   ACCESSION NUMBER(S): [id number]   ORDERING CLINICIAN: ALISE ESCALANTE   TECHNIQUE: Contiguous axial images of the cervical spine were obtained without intravenous contrast. Coronal and sagittal reformatted images were obtained from the axial images.   FINDINGS: No acute fracture of the cervical spine. There is multilevel degenerative change of the cervical spine. There is multilevel intervertebral disc space narrowing and degenerative disc disease and multilevel anterior osseous spurring. There is extensive multilevel ossification of posterior longitudinal ligament resulting in multilevel spinal canal stenosis. There is also degenerative facet and uncovertebral arthropathy. No significant prevertebral soft  tissue edema.       No evidence of acute fracture of the cervical spine.   Multilevel degenerative change of the cervical spine with extensive ossification of the posterior longitudinal ligament resulting in multilevel spinal canal stenosis.   MACRO: None   Signed by: Kameron Quiros 11/14/2024 11:55 PM Dictation workstation:   [id number]    CT head wo IV contrast    Result Date: 11/14/2024  Interpreted By:  Kameron Quiros, STUDY: CT HEAD WO IV CONTRAST;  11/14/2024 10:58 pm   INDICATION: Signs/Symptoms:Fall, injury.   COMPARISON: None   ACCESSION NUMBER(S): [id number]   ORDERING CLINICIAN: ALISE ESCALANTE   TECHNIQUE: Contiguous axial images of the head were obtained without intravenous contrast.   FINDINGS: BRAIN PARENCHYMA:  There is cerebral atrophy and chronic periventricular white matter small vessel ischemic change. The gray white matter differentiation is preserved.  No mass effect or midline shift.   HEMORRHAGE:  No evidence of acute intracranial hemorrhage. VENTRICLES AND EXTRA-AXIAL SPACES:  The ventricles are within normal limits in size for brain volume.  No evidence of abnormal extraaxial fluid collection. EXTRACRANIAL SOFT TISSUES:  Within normal limits. PARANASAL SINUSES/MASTOIDS:  Mild mucosal thickening of the maxillary sinus. CALVARIUM:  No evidence of depressed calvarial fracture.   OTHER FINDINGS:  Irregularity and apparent defect in the left anterior nasal soft tissues.       No evidence of acute intracranial hemorrhage or depressed calvarial fracture.   Cerebral atrophy and chronic periventricular white matter small vessel ischemic change.   Irregularity and apparent defect in the left anterior nasal soft tissues; clinical correlation with physical examination recommended..   MACRO: None   Signed by: Kameron Quiros 11/14/2024 11:53 PM Dictation workstation:   [id number]    CT lumbar spine wo IV contrast    Result Date: 11/14/2024  STUDY: CT Chest, Abdomen, and Pelvis with IV Contrast, CT  Thoracic Spine and Lumbar Spine without IV Contrast; 11/14/2024 11:12 PM INDICATION: Pain status post fall. COMPARISON: XR chest 11/03/2024. ACCESSION NUMBER(S): [id number], [id number], [id number] ORDERING CLINICIAN: EDER SAENZ TECHNIQUE: CT of the chest, abdomen, and pelvis was performed.  Contiguous axial images were obtained at 3 mm slice thickness through the chest, abdomen, and pelvis.  Coronal and sagittal reconstructions at 3 mm slice thickness were performed.  100 mL Omnipaque-350 was administered intravenously. FINDINGS: CHEST: MEDIASTINUM: The heart is normal in size without pericardial effusion.  Mild calcified atheromatous plaque is seen in the thoracic aorta.  Mild to moderate calcified coronary plaque is noted.  LUNGS/PLEURA: There is no pleural effusion, pleural thickening, or pneumothorax. The airways are patent. There is an irregularly-shaped spiculated nodule in the left upper lobe measuring up 1.4 cm in diameter on image 45 of series 204.  4 mm nodule is seen in the medial aspect of the right apex on image 18 of series 201.  LYMPH NODES: Thoracic lymph nodes are not enlarged. ABDOMEN:  LIVER: No hepatomegaly.  Smooth surface contour.  Normal attenuation.  BILE DUCTS: No intrahepatic or extrahepatic biliary ductal dilatation.  GALLBLADDER: Gallbladder is unremarkable.  STOMACH: There is question of mild wall thickening of the gastric antrum.   PANCREAS: Moderate pancreatic atrophy is noted.  SPLEEN: There is a focus of arterial enhancement within the spleen measuring 7 mm, likely hemangioma.   ADRENAL GLANDS: No thickening or nodules.  KIDNEYS AND URETERS: Mild perinephric stranding is noted bilaterally.  No renal or ureteral calculi.  PELVIS:  BLADDER: No abnormalities identified.  REPRODUCTIVE ORGANS: No abnormalities identified.  BOWEL: No abnormalities identified.  Appendix appears normal.  Terminal ileum is unremarkable.  VESSELS: There is mild to moderate calcified atheromatous  plaque in the abdominal aorta and iliac arteries.   PERITONEUM/RETROPERITONEUM/LYMPH NODES: No free fluid.  No pneumoperitoneum. No lymphadenopathy.  ABDOMINAL WALL: No abnormalities identified. SOFT TISSUES: No abnormalities identified.  BONES: No acute fracture or aggressive osseous lesion. THORACIC SPINE: Slightly pronounced thoracic kyphosis is noted with mild to moderate osteophyte formation throughout the thoracic spine as well as the mid and lower lumbar spine.   There is no fracture or traumatic subluxation. Mild multilevel disc space narrowing is present in the mid thoracic spine.  No significant central canal stenosis is demonstrated.  The neural foramina are patent throughout.  The paravertebral soft tissues are within normal limits. LUMBAR SPINE: There is a minimal dextroconvex curvature of the lower thoracic and lumbar spine.  There is no fracture or traumatic subluxation. The vertebral body heights are well maintained.  Disc spaces are preserved.  No significant central canal stenosis is demonstrated. The neural foramina are patent throughout.  The paravertebral soft tissues are within normal limits.    No evidence of acute thoracic, abdominal, or pelvic trauma identified. Spiculated 1.4 cm left upper lobe pulmonary nodule, suspicious for possible malignancy.  Further evaluation is recommended with PET/CT. 4 mm right apical pulmonary nodule. 7 mm enhancing focus in the spleen, likely hemangioma but incompletely characterized on this exam. Mild wall thickening of the gastric antrum which may indicate mild gastritis in the appropriate clinical setting. Signed by Oz Dial    CT thoracic spine wo IV contrast    Result Date: 11/14/2024  STUDY: CT Chest, Abdomen, and Pelvis with IV Contrast, CT Thoracic Spine and Lumbar Spine without IV Contrast; 11/14/2024 11:12 PM INDICATION: Pain status post fall. COMPARISON: XR chest 11/03/2024. ACCESSION NUMBER(S): [id number], [id number], [id number] ORDERING  CLINICIAN: EDER SAENZ TECHNIQUE: CT of the chest, abdomen, and pelvis was performed.  Contiguous axial images were obtained at 3 mm slice thickness through the chest, abdomen, and pelvis.  Coronal and sagittal reconstructions at 3 mm slice thickness were performed.  100 mL Omnipaque-350 was administered intravenously. FINDINGS: CHEST: MEDIASTINUM: The heart is normal in size without pericardial effusion.  Mild calcified atheromatous plaque is seen in the thoracic aorta.  Mild to moderate calcified coronary plaque is noted.  LUNGS/PLEURA: There is no pleural effusion, pleural thickening, or pneumothorax. The airways are patent. There is an irregularly-shaped spiculated nodule in the left upper lobe measuring up 1.4 cm in diameter on image 45 of series 204.  4 mm nodule is seen in the medial aspect of the right apex on image 18 of series 201.  LYMPH NODES: Thoracic lymph nodes are not enlarged. ABDOMEN:  LIVER: No hepatomegaly.  Smooth surface contour.  Normal attenuation.  BILE DUCTS: No intrahepatic or extrahepatic biliary ductal dilatation.  GALLBLADDER: Gallbladder is unremarkable.  STOMACH: There is question of mild wall thickening of the gastric antrum.   PANCREAS: Moderate pancreatic atrophy is noted.  SPLEEN: There is a focus of arterial enhancement within the spleen measuring 7 mm, likely hemangioma.   ADRENAL GLANDS: No thickening or nodules.  KIDNEYS AND URETERS: Mild perinephric stranding is noted bilaterally.  No renal or ureteral calculi.  PELVIS:  BLADDER: No abnormalities identified.  REPRODUCTIVE ORGANS: No abnormalities identified.  BOWEL: No abnormalities identified.  Appendix appears normal.  Terminal ileum is unremarkable.  VESSELS: There is mild to moderate calcified atheromatous plaque in the abdominal aorta and iliac arteries.   PERITONEUM/RETROPERITONEUM/LYMPH NODES: No free fluid.  No pneumoperitoneum. No lymphadenopathy.  ABDOMINAL WALL: No abnormalities identified. SOFT TISSUES: No  abnormalities identified.  BONES: No acute fracture or aggressive osseous lesion. THORACIC SPINE: Slightly pronounced thoracic kyphosis is noted with mild to moderate osteophyte formation throughout the thoracic spine as well as the mid and lower lumbar spine.   There is no fracture or traumatic subluxation. Mild multilevel disc space narrowing is present in the mid thoracic spine.  No significant central canal stenosis is demonstrated.  The neural foramina are patent throughout.  The paravertebral soft tissues are within normal limits. LUMBAR SPINE: There is a minimal dextroconvex curvature of the lower thoracic and lumbar spine.  There is no fracture or traumatic subluxation. The vertebral body heights are well maintained.  Disc spaces are preserved.  No significant central canal stenosis is demonstrated. The neural foramina are patent throughout.  The paravertebral soft tissues are within normal limits.    No evidence of acute thoracic, abdominal, or pelvic trauma identified. Spiculated 1.4 cm left upper lobe pulmonary nodule, suspicious for possible malignancy.  Further evaluation is recommended with PET/CT. 4 mm right apical pulmonary nodule. 7 mm enhancing focus in the spleen, likely hemangioma but incompletely characterized on this exam. Mild wall thickening of the gastric antrum which may indicate mild gastritis in the appropriate clinical setting. Signed by Oz Dial    CT chest abdomen pelvis w IV contrast    Result Date: 11/14/2024  STUDY: CT Chest, Abdomen, and Pelvis with IV Contrast, CT Thoracic Spine and Lumbar Spine without IV Contrast; 11/14/2024 11:12 PM INDICATION: Pain status post fall. COMPARISON: XR chest 11/03/2024. ACCESSION NUMBER(S): [id number], [id number], [id number] ORDERING CLINICIAN: EDER SAENZ TECHNIQUE: CT of the chest, abdomen, and pelvis was performed.  Contiguous axial images were obtained at 3 mm slice thickness through the chest, abdomen, and pelvis.  Coronal and  sagittal reconstructions at 3 mm slice thickness were performed.  100 mL Omnipaque-350 was administered intravenously. FINDINGS: CHEST: MEDIASTINUM: The heart is normal in size without pericardial effusion.  Mild calcified atheromatous plaque is seen in the thoracic aorta.  Mild to moderate calcified coronary plaque is noted.  LUNGS/PLEURA: There is no pleural effusion, pleural thickening, or pneumothorax. The airways are patent. There is an irregularly-shaped spiculated nodule in the left upper lobe measuring up 1.4 cm in diameter on image 45 of series 204.  4 mm nodule is seen in the medial aspect of the right apex on image 18 of series 201.  LYMPH NODES: Thoracic lymph nodes are not enlarged. ABDOMEN:  LIVER: No hepatomegaly.  Smooth surface contour.  Normal attenuation.  BILE DUCTS: No intrahepatic or extrahepatic biliary ductal dilatation.  GALLBLADDER: Gallbladder is unremarkable.  STOMACH: There is question of mild wall thickening of the gastric antrum.   PANCREAS: Moderate pancreatic atrophy is noted.  SPLEEN: There is a focus of arterial enhancement within the spleen measuring 7 mm, likely hemangioma.   ADRENAL GLANDS: No thickening or nodules.  KIDNEYS AND URETERS: Mild perinephric stranding is noted bilaterally.  No renal or ureteral calculi.  PELVIS:  BLADDER: No abnormalities identified.  REPRODUCTIVE ORGANS: No abnormalities identified.  BOWEL: No abnormalities identified.  Appendix appears normal.  Terminal ileum is unremarkable.  VESSELS: There is mild to moderate calcified atheromatous plaque in the abdominal aorta and iliac arteries.   PERITONEUM/RETROPERITONEUM/LYMPH NODES: No free fluid.  No pneumoperitoneum. No lymphadenopathy.  ABDOMINAL WALL: No abnormalities identified. SOFT TISSUES: No abnormalities identified.  BONES: No acute fracture or aggressive osseous lesion. THORACIC SPINE: Slightly pronounced thoracic kyphosis is noted with mild to moderate osteophyte formation throughout the  thoracic spine as well as the mid and lower lumbar spine.   There is no fracture or traumatic subluxation. Mild multilevel disc space narrowing is present in the mid thoracic spine.  No significant central canal stenosis is demonstrated.  The neural foramina are patent throughout.  The paravertebral soft tissues are within normal limits. LUMBAR SPINE: There is a minimal dextroconvex curvature of the lower thoracic and lumbar spine.  There is no fracture or traumatic subluxation. The vertebral body heights are well maintained.  Disc spaces are preserved.  No significant central canal stenosis is demonstrated. The neural foramina are patent throughout.  The paravertebral soft tissues are within normal limits.    No evidence of acute thoracic, abdominal, or pelvic trauma identified. Spiculated 1.4 cm left upper lobe pulmonary nodule, suspicious for possible malignancy.  Further evaluation is recommended with PET/CT. 4 mm right apical pulmonary nodule. 7 mm enhancing focus in the spleen, likely hemangioma but incompletely characterized on this exam. Mild wall thickening of the gastric antrum which may indicate mild gastritis in the appropriate clinical setting. Signed by Oz Dial    ECG 12 lead    Result Date: 11/4/2024  Normal sinus rhythm Left axis deviation Cannot rule out Septal infarct , age undetermined Nonspecific ST elevation Abnormal ECG No previous ECGs available Confirmed by Molina Shell ([id number]) on 11/4/2024 5:44:02 PM    XR sacrum coccyx 2+ views    Result Date: 11/3/2024  Interpreted By:  Anayeli Balderrama, STUDY: XR SACRUM COCCYX 2+ VIEWS; ;  11/3/2024 8:08 pm   INDICATION: Signs/Symptoms:fall, posterior pain.   COMPARISON: None.   ACCESSION NUMBER(S): [id number]   ORDERING CLINICIAN: QUOC TRUONG   FINDINGS: Three views sacrum-coccyx. No displaced sacrococcygeal fracture or visualized nondisplaced fracture. No pubic symphysis or sacroiliac diastasis. Atherosclerotic vascular calcifications are  noted.       No sacrococcygeal fracture.     MACRO: None   Signed by: Anayeli Balderrama 11/3/2024 8:32 PM Dictation workstation:   [id number]    XR chest 1 view    Result Date: 11/3/2024  Interpreted By:  Anayeli Balderrama, STUDY: XR CHEST 1 VIEW;  11/3/2024 8:08 pm   INDICATION: Signs/Symptoms:Generalized weakness.   COMPARISON: None.   ACCESSION NUMBER(S): [id number]   ORDERING CLINICIAN: QUOC TRUONG   FINDINGS:     CARDIOMEDIASTINAL SILHOUETTE: Cardiomediastinal silhouette is normal in size and configuration.   LUNGS: No pulmonary consolidation, pleural effusion or pneumothorax.   ABDOMEN: No remarkable upper abdominal findings.   BONES: No acute osseous abnormality.       No acute cardiopulmonary process.   MACRO: None   Signed by: Anayeli Balderrama 11/3/2024 8:30 PM Dictation workstation:   [id number]    CT soft tissue neck wo IV contrast    Result Date: 10/26/2024  EXAMINATION: CT NECK W/O CONTRAST 10/26/2024 04:18 PM CLINICAL HISTORY: Neck soft tissue neoplasm suspected ASSOCIATED DIAGNOSIS: Neck soft tissue neoplasm suspected ORDERING PROVIDER: VALERY HUFFMAN TECHNOLOGISTS NOTE: COMPARISON: Neck CT from 8/27/2024, PET/CT from 4/10/2024, and neck CT from 4/2/2024 TECHNIQUE: Thin axial images were obtained through the neck without intravenous contrast. 2D sagittal and coronal reconstructions were obtained from the axial data. FINDINGS: Aerodigestive tract:  Patient is edentulous. Evaluation is degraded in the absence of intravenous contrast. There is an ulcerated confluent soft tissue mass at the tip of the nose with extension inferiorly to the right naris and premaxillary soft tissues corresponding to known treated malignancy, approximately 3.7 x 4.1 x 7.3 cm (AP, TR, CC). There is no evidence of a progressive confluent soft tissue mass within the nasopharynx, oropharynx, oral cavity, or tongue base within constraints of the acquisition. Thickening of the epiglottis may relate to radiation. The aryepiglottic  folds and true vocal cords are within normal limits. No subglottic stenosis. Lymph nodes: There has been mild enlargement of the metastatic lymph node at the right parotid tail and right level IIA station, now 2.8 cm in long axis. Regions of central hypoattenuation suggestive of necrosis have minimally increased from prior. The previously hypermetabolic left subpectoral lymph node is slightly smaller, now 0.5 cm. No emerging cervical lymphadenopathy within constraints of the acquisition. Major salivary glands:  Metastatic lymph node at the right parotid tail as detailed. Remaining major salivary glands are within normal limits. Thyroid gland:  Normal. Carotid space:  Patency is not assessed in the absence of intravenous contrast. Atheromatous calcifications of the left carotid bifurcation.   Intracranial contents:  Moderate generalized brain parenchymal volume loss with commensurate ventricular caliber. Paranasal sinuses, middle ears, mastoids:  Minimal scattered paranasal sinus mucosal thickening. The tympanomastoid cavities are unopacified. Orbits:  Within normal limits.     Bones:  No suspicious osseous lesions involving the imaged calvarium, skull base, or spine.  Cervicothoracic alignment is within normal limits. Advanced multilevel degenerative disc space narrowing with endplate spurring greatest at C5-6 and C6-7. Segmental ossification of posterior longitudinal ligament with severe spinal canal stenosis and cervical cord flattening greatest at C3-4 and C6-7, similar to prior. Temporomandibular joints are maintained.     Lungs:  Spiculated part solid nodule in the left lung apex measures 12 mm in average axial dimension, consistent with primary pulmonary malignancy. IMPRESSION: Ulcerated nasal mass suggestive of squamous cell carcinoma exhibits progressive ulceration since 8/27/2024. There has been mild enlargement of a metastatic lymph node at the right parotid tail and level IIA station, 2.8 cm. No emerging  cervical lymphadenopathy otherwise. No clear evidence of a second primary malignancy along the course of the aerodigestive tract or neck soft tissue infection within constraints of an unenhanced exam. OPLL with cervical cord compression, similar to prior. Similar appearance of left upper lobe adenocarcinoma. MACRO: None     Encounter Date: 11/19/24   ECG 12 lead   Result Value    Ventricular Rate 72    Atrial Rate 72    CO Interval 154    QRS Duration 70    QT Interval 380    QTC Calculation(Bazett) 416    P Axis 83    R Axis 4    T Axis 77    QRS Count 12    Q Onset 226    P Onset 149    P Offset 192    T Offset 416    QTC Fredericia 404    Narrative    Normal sinus rhythm  Normal ECG  When compared with ECG of 19-NOV-2024 11:14, (unconfirmed)  Borderline criteria for Inferior infarct are no longer Present  Non-specific change in ST segment in Inferior leads  T wave inversion no longer evident in Inferior leads       Home Medications  Prior to Admission medications    Medication Sig Start Date End Date Taking? Authorizing Provider   gabapentin (Neurontin) 800 mg tablet Take 1 tablet (800 mg) by mouth 3 times a day.   Yes Historical Provider, MD   lidocaine 4 % patch Place 2 patches over 12 hours on the skin once daily. Remove & discard patch within 12 hours or as directed by MD. 11/17/24  Yes Loreto Yoon, APRN-CNP   lidocaine-diphenhydrAMINE-Maalox 1:1:1 Magic Mouthwash Swish and spit 10 mL 4 times a day as needed (pain).   Yes Historical Provider, MD   lisinopril 2.5 mg tablet Take 1 tablet (2.5 mg) by mouth once daily.   Yes Historical Provider, MD   methylPREDNISolone (Medrol Dospak) 4 mg tablets Follow schedule on package instructions 11/19/24 11/26/24 Yes Marc Christian, DO   nystatin (Mycostatin) 100,000 unit/mL suspension 5 mL (500,000 Units) 4 times a day.   Yes Historical Provider, MD   SITagliptin phosphate (Januvia) 100 mg tablet Take 1 tablet (100 mg) by mouth once daily.   Yes Historical  Provider, MD   tamsulosin (Flomax) 0.4 mg 24 hr capsule Take 1 capsule (0.4 mg) by mouth once daily. 11/16/24 12/16/24 Yes ARTEMIO Kong   tiZANidine (Zanaflex) 4 mg tablet Take 1 tablet (4 mg) by mouth 2 times a day as needed for muscle spasms.   Yes Historical Provider, MD   nicotine (Nicoderm CQ) 14 mg/24 hr patch Place 1 patch over 24 hours on the skin once daily. 11/17/24   ARTEMIO Kong   glipiZIDE XL (Glucotrol XL) 10 mg 24 hr tablet Take 2 tablets (20 mg) by mouth once daily.  11/16/24  Historical Provider, MD       Medications  Scheduled medications  lidocaine, 1 Application, urethral, Once  lidocaine, , ,       Continuous medications     PRN medications  PRN medications: lidocaine      Assessment/Plan   Assessment & Plan  Gait disorder  Consult PT/OT and case management for skilled nursing placement.  Patient family prefer [facility].   Severe protein-calorie malnutrition (Multi)  Chronic issue due to mouth sores, continued home nystatin. Consult nutrition.   Adenocarcinoma of left lung (Multi)  Last chemo 3 weeks ago.   Type 2 diabetes mellitus  Continue home meds and SSI  Squamous cell carcinoma of nose  Wound care ordered.   Chronic anemia  Likely contributing to his weakness, appears at baseline  Gait abnormality  PT OT.                CODE STATUS: Had discussion with patient and his daughter at the bedside.  It appears they have had a discussion in the past but at this time he is saying he would want to be full code we did discuss the risk with him being on chemotherapy and his frail state that CPR could cause trauma and rib fractures and be ineffective and possibly harmful.  He tells me he would not want to be connected to a machine and when I explained that that is what would happen if he arrested and we needed to put a breathing tube in and he did not immediately respond to treatment.  He also tells me he does not think he actually has lung cancer only  skin cancer but his daughter disagrees with this.  At this time he will remain full code and we will consult palliative care for a discussion that does not take place in the hallway of the emergency room i.e. a more calming environment that could possibly be more beneficial.     Further evaluation and management per attending and consulting physicians.      This note has been transcribed using Dragon voice recognition system and there is a possibility of unintentional typing misprints.  Any information found to be copied from previous providers is done in the best interest of the patient to provide accurate, quality, and continuity of care.     I spent 35 minutes in the professional and overall care of this patient.      Elina Jacob, APRN-CNP  [facility]

## 2024-11-23 LAB
ANION GAP SERPL CALC-SCNC: 12 MMOL/L (ref 10–20)
BUN SERPL-MCNC: 17 MG/DL (ref 6–23)
CALCIUM SERPL-MCNC: 8.4 MG/DL (ref 8.6–10.3)
CHLORIDE SERPL-SCNC: 96 MMOL/L (ref 98–107)
CO2 SERPL-SCNC: 29 MMOL/L (ref 21–32)
CREAT SERPL-MCNC: 0.92 MG/DL (ref 0.5–1.3)
EGFRCR SERPLBLD CKD-EPI 2021: 87 ML/MIN/1.73M*2
ERYTHROCYTE [DISTWIDTH] IN BLOOD BY AUTOMATED COUNT: 17.6 % (ref 11.5–14.5)
GLUCOSE BLD MANUAL STRIP-MCNC: 100 MG/DL (ref 74–99)
GLUCOSE BLD MANUAL STRIP-MCNC: 209 MG/DL (ref 74–99)
GLUCOSE SERPL-MCNC: 129 MG/DL (ref 74–99)
HCT VFR BLD AUTO: 27.9 % (ref 41–52)
HGB BLD-MCNC: 8.9 G/DL (ref 13.5–17.5)
HOLD SPECIMEN: NORMAL
MCH RBC QN AUTO: 28.7 PG (ref 26–34)
MCHC RBC AUTO-ENTMCNC: 31.9 G/DL (ref 32–36)
MCV RBC AUTO: 90 FL (ref 80–100)
NRBC BLD-RTO: 0 /100 WBCS (ref 0–0)
PLATELET # BLD AUTO: 232 X10*3/UL (ref 150–450)
POTASSIUM SERPL-SCNC: 3.7 MMOL/L (ref 3.5–5.3)
RBC # BLD AUTO: 3.1 X10*6/UL (ref 4.5–5.9)
SODIUM SERPL-SCNC: 133 MMOL/L (ref 136–145)
WBC # BLD AUTO: 9.5 X10*3/UL (ref 4.4–11.3)

## 2024-11-23 PROCEDURE — 2500000005 HC RX 250 GENERAL PHARMACY W/O HCPCS: Performed by: NURSE PRACTITIONER

## 2024-11-23 PROCEDURE — 97161 PT EVAL LOW COMPLEX 20 MIN: CPT | Mod: GP

## 2024-11-23 PROCEDURE — 85027 COMPLETE CBC AUTOMATED: CPT | Performed by: NURSE PRACTITIONER

## 2024-11-23 PROCEDURE — 2500000002 HC RX 250 W HCPCS SELF ADMINISTERED DRUGS (ALT 637 FOR MEDICARE OP, ALT 636 FOR OP/ED): Performed by: NURSE PRACTITIONER

## 2024-11-23 PROCEDURE — 97530 THERAPEUTIC ACTIVITIES: CPT | Mod: GP

## 2024-11-23 PROCEDURE — 2500000002 HC RX 250 W HCPCS SELF ADMINISTERED DRUGS (ALT 637 FOR MEDICARE OP, ALT 636 FOR OP/ED): Performed by: UROLOGY

## 2024-11-23 PROCEDURE — G0378 HOSPITAL OBSERVATION PER HR: HCPCS

## 2024-11-23 PROCEDURE — S4991 NICOTINE PATCH NONLEGEND: HCPCS | Performed by: NURSE PRACTITIONER

## 2024-11-23 PROCEDURE — 2500000001 HC RX 250 WO HCPCS SELF ADMINISTERED DRUGS (ALT 637 FOR MEDICARE OP): Performed by: NURSE PRACTITIONER

## 2024-11-23 PROCEDURE — 36415 COLL VENOUS BLD VENIPUNCTURE: CPT | Performed by: NURSE PRACTITIONER

## 2024-11-23 PROCEDURE — 80048 BASIC METABOLIC PNL TOTAL CA: CPT | Performed by: NURSE PRACTITIONER

## 2024-11-23 PROCEDURE — 82947 ASSAY GLUCOSE BLOOD QUANT: CPT

## 2024-11-23 RX ORDER — ACETAMINOPHEN 160 MG/5ML
650 SOLUTION ORAL EVERY 4 HOURS PRN
Status: DISCONTINUED | OUTPATIENT
Start: 2024-11-23 | End: 2024-11-29 | Stop reason: HOSPADM

## 2024-11-23 RX ORDER — METHYLPREDNISOLONE 4 MG/1
12 TABLET ORAL ONCE
Status: DISCONTINUED | OUTPATIENT
Start: 2024-11-26 | End: 2024-11-23

## 2024-11-23 RX ORDER — NYSTATIN 100000 [USP'U]/ML
5 SUSPENSION ORAL 4 TIMES DAILY
Status: DISCONTINUED | OUTPATIENT
Start: 2024-11-23 | End: 2024-11-29 | Stop reason: HOSPADM

## 2024-11-23 RX ORDER — IBUPROFEN 200 MG
1 TABLET ORAL DAILY
Status: DISCONTINUED | OUTPATIENT
Start: 2024-11-23 | End: 2024-11-29 | Stop reason: HOSPADM

## 2024-11-23 RX ORDER — METHYLPREDNISOLONE 4 MG/1
8 TABLET ORAL ONCE
Status: DISCONTINUED | OUTPATIENT
Start: 2024-11-24 | End: 2024-11-24

## 2024-11-23 RX ORDER — TAMSULOSIN HYDROCHLORIDE 0.4 MG/1
0.4 CAPSULE ORAL DAILY
Status: DISCONTINUED | OUTPATIENT
Start: 2024-11-23 | End: 2024-11-23

## 2024-11-23 RX ORDER — GABAPENTIN 400 MG/1
800 CAPSULE ORAL 3 TIMES DAILY
Status: DISCONTINUED | OUTPATIENT
Start: 2024-11-23 | End: 2024-11-27

## 2024-11-23 RX ORDER — ACETAMINOPHEN 325 MG/1
650 TABLET ORAL EVERY 4 HOURS PRN
Status: DISCONTINUED | OUTPATIENT
Start: 2024-11-23 | End: 2024-11-29 | Stop reason: HOSPADM

## 2024-11-23 RX ORDER — METHYLPREDNISOLONE 4 MG/1
8 TABLET ORAL ONCE
Status: DISCONTINUED | OUTPATIENT
Start: 2024-11-27 | End: 2024-11-23

## 2024-11-23 RX ORDER — ACETAMINOPHEN 650 MG/1
650 SUPPOSITORY RECTAL EVERY 4 HOURS PRN
Status: DISCONTINUED | OUTPATIENT
Start: 2024-11-23 | End: 2024-11-29 | Stop reason: HOSPADM

## 2024-11-23 RX ORDER — METHYLPREDNISOLONE 4 MG/1
12 TABLET ORAL ONCE
Status: COMPLETED | OUTPATIENT
Start: 2024-11-24 | End: 2024-11-24

## 2024-11-23 RX ORDER — METHYLPREDNISOLONE 4 MG/1
4 TABLET ORAL ONCE
Status: DISCONTINUED | OUTPATIENT
Start: 2024-11-28 | End: 2024-11-23

## 2024-11-23 RX ORDER — TIZANIDINE 4 MG/1
4 TABLET ORAL 2 TIMES DAILY PRN
Status: DISCONTINUED | OUTPATIENT
Start: 2024-11-23 | End: 2024-11-27

## 2024-11-23 RX ORDER — METHYLPREDNISOLONE 4 MG/1
24 TABLET ORAL ONCE
Status: DISCONTINUED | OUTPATIENT
Start: 2024-11-23 | End: 2024-11-23

## 2024-11-23 RX ORDER — LISINOPRIL 2.5 MG/1
2.5 TABLET ORAL DAILY
Status: DISCONTINUED | OUTPATIENT
Start: 2024-11-23 | End: 2024-11-29 | Stop reason: HOSPADM

## 2024-11-23 RX ORDER — METHYLPREDNISOLONE 4 MG/1
20 TABLET ORAL ONCE
Status: DISCONTINUED | OUTPATIENT
Start: 2024-11-24 | End: 2024-11-23

## 2024-11-23 RX ORDER — TALC
3 POWDER (GRAM) TOPICAL NIGHTLY PRN
Status: DISCONTINUED | OUTPATIENT
Start: 2024-11-23 | End: 2024-11-29 | Stop reason: HOSPADM

## 2024-11-23 RX ORDER — METHYLPREDNISOLONE 4 MG/1
16 TABLET ORAL ONCE
Status: DISCONTINUED | OUTPATIENT
Start: 2024-11-25 | End: 2024-11-23

## 2024-11-23 RX ORDER — METHYLPREDNISOLONE 4 MG/1
4 TABLET ORAL
Status: DISCONTINUED | OUTPATIENT
Start: 2024-11-23 | End: 2024-11-23

## 2024-11-23 RX ORDER — METHYLPREDNISOLONE 4 MG/1
4 TABLET ORAL ONCE
Status: DISCONTINUED | OUTPATIENT
Start: 2024-11-25 | End: 2024-11-24

## 2024-11-23 RX ORDER — LIDOCAINE 560 MG/1
2 PATCH PERCUTANEOUS; TOPICAL; TRANSDERMAL DAILY
Status: DISCONTINUED | OUTPATIENT
Start: 2024-11-23 | End: 2024-11-29 | Stop reason: HOSPADM

## 2024-11-23 ASSESSMENT — COGNITIVE AND FUNCTIONAL STATUS - GENERAL
PERSONAL GROOMING: A LITTLE
DRESSING REGULAR UPPER BODY CLOTHING: A LITTLE
STANDING UP FROM CHAIR USING ARMS: A LITTLE
WALKING IN HOSPITAL ROOM: A LOT
HELP NEEDED FOR BATHING: A LITTLE
MOVING FROM LYING ON BACK TO SITTING ON SIDE OF FLAT BED WITH BEDRAILS: A LITTLE
WALKING IN HOSPITAL ROOM: A LITTLE
WALKING IN HOSPITAL ROOM: A LOT
MOVING TO AND FROM BED TO CHAIR: A LITTLE
CLIMB 3 TO 5 STEPS WITH RAILING: A LOT
MOBILITY SCORE: 16
TURNING FROM BACK TO SIDE WHILE IN FLAT BAD: A LITTLE
DRESSING REGULAR LOWER BODY CLOTHING: A LITTLE
MOBILITY SCORE: 18
CLIMB 3 TO 5 STEPS WITH RAILING: A LOT
DAILY ACTIVITIY SCORE: 18
CLIMB 3 TO 5 STEPS WITH RAILING: A LOT
HELP NEEDED FOR BATHING: A LITTLE
EATING MEALS: A LITTLE
PERSONAL GROOMING: A LITTLE
DRESSING REGULAR LOWER BODY CLOTHING: A LITTLE
STANDING UP FROM CHAIR USING ARMS: A LITTLE
MOBILITY SCORE: 16
MOVING FROM LYING ON BACK TO SITTING ON SIDE OF FLAT BED WITH BEDRAILS: A LITTLE
EATING MEALS: A LITTLE
MOVING TO AND FROM BED TO CHAIR: A LITTLE
TOILETING: A LITTLE
MOVING TO AND FROM BED TO CHAIR: A LITTLE
TOILETING: A LITTLE
DRESSING REGULAR UPPER BODY CLOTHING: A LITTLE
TURNING FROM BACK TO SIDE WHILE IN FLAT BAD: A LITTLE
TURNING FROM BACK TO SIDE WHILE IN FLAT BAD: A LITTLE
DAILY ACTIVITIY SCORE: 18
STANDING UP FROM CHAIR USING ARMS: A LITTLE

## 2024-11-23 ASSESSMENT — PAIN - FUNCTIONAL ASSESSMENT
PAIN_FUNCTIONAL_ASSESSMENT: 0-10

## 2024-11-23 ASSESSMENT — PAIN SCALES - GENERAL
PAINLEVEL_OUTOF10: 0 - NO PAIN
PAINLEVEL_OUTOF10: 0 - NO PAIN
PAINLEVEL_OUTOF10: 9
PAINLEVEL_OUTOF10: 0 - NO PAIN

## 2024-11-23 ASSESSMENT — PAIN SCALES - WONG BAKER: WONGBAKER_NUMERICALRESPONSE: NO HURT

## 2024-11-23 NOTE — PROGRESS NOTES
"This patient was referred to social work on this date for the following:    \"Frequent falls, everyday since last seen here in the ED, no traumatic injuries, unable to care for himself. Has basal cell carcinoma on radiation therapy with plan for eventual excision. Needs placement for inability to care for self. We can place therapy orders\"    Patient has [employer] insurance and will need a precert as well as physical therapy and occupational therapy orders. I was informed they have been placed.     Upon completion of evaluations will need arrange disposition according to recommended skill level.       "

## 2024-11-23 NOTE — CONSULTS
Urology Consultation      Patient:  Josias Ramirez  MRN: [id number]  YOB: 1950    CHIEF COMPLAINT:  Urinary retention, gross hematuria    HISTORY OF PRESENT ILLNESS:   The patient is a 74 y.o. male admitted after presenting to the Emergency Department with failure to thrive.  Patient has had multiple recent falls.  During recent admission, patient was noted to have urinary retention and a Manning catheter was placed.  He traumatically removed his catheter at home 4 days ago.  Patient was noted to be retaining approximately 300 mL of urine in the emergency department and was only voiding very small amounts.  Multiple attempts were made by the emergency department team to place a Manning catheter without success.  Blood was noted per the meatus and urology is consulted for assistance.  Patient reports feeling of bladder distention and penile pain.  He denies difficulty urinating prior to recent admission.    Patient's old records, notes and chart reviewed and summarized above.    Past Medical History:    Past Medical History:   Diagnosis Date    Adenocarcinoma of left lung (Multi)     Cancer (Multi)     Cataracts, bilateral     Chronic anemia     Metastasis (Multi)     Mixed hyperlipidemia     Nonmelanoma skin cancer     Of face, head, right forearm    Parotid lymphadenopathy     Pedal edema     Peptic ulcer disease     Recurrent falls     Squamous cell carcinoma of nose     Tobacco use disorder     Type 2 diabetes mellitus     Vitamin B12 deficiency        Past Surgical History:    Past Surgical History:   Procedure Laterality Date    OTHER SURGICAL HISTORY      Multiple Mohs procedures    PILONIDAL CYST DRAINAGE      SINUS SURGERY      SKIN BIOPSY         Medications:      Current Facility-Administered Medications:     dextrose 50 % injection 12.5 g, 12.5 g, intravenous, q15 min PRN, ARTEMIO Davis    dextrose 50 % injection 25 g, 25 g, intravenous, q15 min PRN, ARTEMIO Davis    flu  vaccine, trivalent, preservative free, HIGH-DOSE, age 65y+ (Fluzone), 0.5 mL, intramuscular, During hospitalization, RAMILA Gordon MD    glucagon (Glucagen) injection 1 mg, 1 mg, intramuscular, q15 min PRN, ARTEMIO Davis    glucagon (Glucagen) injection 1 mg, 1 mg, intramuscular, q15 min PRN, ARTEMIO Davis    [START ON 11/23/2024] insulin lispro injection 0-5 Units, 0-5 Units, subcutaneous, TID AC, ARTEMIO Davis    lidocaine 2 % mucosal jelly (Uro-Jet) 1 Application, 1 Application, urethral, Once, Melvin Lyons MD    sodium chloride 0.9 % irrigation solution 3,000 mL, 3,000 mL, irrigation, Continuous, Melvin Lyons MD    Allergies:    Allergies   Allergen Reactions    Penicillins Unknown    Tramadol Itching       Social History:   Social History     Socioeconomic History    Marital status: Single     Spouse name: Not on file    Number of children: Not on file    Years of education: Not on file    Highest education level: Not on file   Occupational History    Not on file   Tobacco Use    Smoking status: Every Day     Current packs/day: 0.50     Types: Cigarettes    Smokeless tobacco: Never   Vaping Use    Vaping status: Never Used   Substance and Sexual Activity    Alcohol use: Never    Drug use: Never    Sexual activity: Defer   Other Topics Concern    Not on file   Social History Narrative    Not on file     Social Drivers of Health     Financial Resource Strain: Low Risk  (11/22/2024)    Overall Financial Resource Strain (CARDIA)     Difficulty of Paying Living Expenses: Not very hard   Food Insecurity: No Food Insecurity (11/22/2024)    Hunger Vital Sign     Worried About Running Out of Food in the Last Year: Never true     Ran Out of Food in the Last Year: Never true   Transportation Needs: No Transportation Needs (11/22/2024)    PRAPARE - Transportation     Lack of Transportation (Medical): No     Lack of Transportation (Non-Medical): No   Physical Activity: Inactive  (11/15/2024)    Exercise Vital Sign     Days of Exercise per Week: 0 days     Minutes of Exercise per Session: 0 min   Stress: No Stress Concern Present (11/22/2024)    [employer] - Occupational Stress Questionnaire     Feeling of Stress : Not at all   Social Connections: Feeling Socially Integrated (11/20/2024)    OASIS : Social Isolation     Frequency of experiencing loneliness or isolation: Never   Recent Concern: Social Connections - Moderately Isolated (11/15/2024)    Social Connection and Isolation Panel [NHANES]     Frequency of Communication with Friends and Family: More than three times a week     Frequency of Social Gatherings with Friends and Family: Three times a week     Attends Voodoo Services: More than 4 times per year     Active Member of Clubs or Organizations: No     Attends Club or Organization Meetings: Never     Marital Status:    Intimate Partner Violence: Not At Risk (11/22/2024)    Humiliation, Afraid, Rape, and Kick questionnaire     Fear of Current or Ex-Partner: No     Emotionally Abused: No     Physically Abused: No     Sexually Abused: No   Housing Stability: Low Risk  (11/22/2024)    Housing Stability Vital Sign     Unable to Pay for Housing in the Last Year: No     Number of Times Moved in the Last Year: 1     Homeless in the Last Year: No       Family History:    Family History   Problem Relation Name Age of Onset    Other (Diabetes mellitus) Mother      Other (Diabetes mellitus) Sister      Other (Liver transplant) Brother         REVIEW OF SYSTEMS:  A comprehensive 14 point review of systems was obtained.  Constitutional: + fatigue  Eyes: No blurry vision  Ears, nose, mouth, throat, face: No ringing in the ears; no facial droop  Respiratory: No shortness of breath  Cardiovascular: No palpitations  Gastrointestinal: No diarrhea  Genitourinary: See HPI  Integument/Skin: No rashes  Hematologic/Lymphatic: No easy bruising  Musculoskeletal:  "+ muscle pains  Neurologic: +weakness in the extremities.   Psychiatric: No depression or suicidal thoughts.  Endocrine: No heat or cold intolerances.  Allergic/Immunologic: No current seasonal allergies; no skin hives.        Physical Exam:    This a 74 y.o. male   Vitals:    11/22/24 1353 11/22/24 1705 11/22/24 1851   BP: 141/64 155/65 164/67   BP Location: Right arm Right arm Right arm   Patient Position: Sitting Sitting Lying   Pulse: 81 82 88   Resp: 18 16 17   Temp: 36 °C (96.8 °F)  37 °C (98.6 °F)   TempSrc: Temporal  Temporal   SpO2: 98% 99% 98%   Weight: 59.7 kg (131 lb 9.8 oz)     Height: 1.803 m (5' 11\")        Constitutional: Patient in no acute distress.  Neuro: alert and oriented to person place and time.   Head: Atraumatic and normocephalic.  Neck: Trachea midline   Ext: 1+ radial pulses bilaterally.  Psych: Mood and affect normal.  Skin: No rashes or bruising present.  Lungs: Respiratory effort normal.  Cardiovascular:  Regular rate.  Abdomen: Soft, mild lower abdominal tenderness.  Mild distention.  CVA tenderness not present  Bladder mildly tender, distended.   Penis: Blood per meatus.  No calf tenderness to palpation bilaterally.    Labs:  Results from last 7 days   Lab Units 11/22/24  1432 11/19/24  1018 11/16/24  0549   SODIUM mmol/L 131* 135* 137   POTASSIUM mmol/L 4.1 3.6 3.9   CHLORIDE mmol/L 94* 98 101   CO2 mmol/L 29 30 28   BUN mg/dL 23 19 15   CREATININE mg/dL 1.04 1.09 0.99   GLUCOSE mg/dL 265* 164* 71*   CALCIUM mg/dL 8.5* 7.9* 8.0*     Results from last 7 days   Lab Units 11/22/24  1432 11/19/24  1018 11/16/24  0549   WBC AUTO x10*3/uL 8.0 5.7 5.7   HEMOGLOBIN g/dL 8.7* 8.3* 8.3*   HEMATOCRIT % 28.0* 26.0* 26.5*   PLATELETS AUTO x10*3/uL 230 158 131*       Lab Results   Component Value Date    APPEARANCEU Clear 11/22/2024    COLORU Yellow 11/22/2024    KETONESU TRACE (A) 11/22/2024    PROTUR 20 (TRACE) 11/22/2024    SPECGRAVU 1.019 11/22/2024    UROBILINOGEN Normal 11/22/2024    WBCU " "1-5 11/22/2024       No results found for: \"PSA\"    Culture Results:  Pending      -----------------------------------------------------------------  Imaging Results:  11/14/2024 CT abdomen pelvis with IV contrast  Images reviewed and report read.  No hydronephrosis.  No definitive kidney stones.  Bladder appears large capacity but not significantly distended.  Enlarged prostate.    Assessment and Plan   Impression:    74-year-old male with urinary retention, benign prostatic hyperplasia with outflow obstruction, gross hematuria, failure to thrive        Plan:   Urinary retention likely secondary to benign prostatic hyperplasia with outflow obstruction as well as possible neurogenic component.  Nursing staff unable to place Manning catheter despite multiple attempts.  Patient was prepped and draped in a sterile fashion.  He was agreeable to catheter placement.  Uro-Jet injected.  A 24 Scottish coudé tip hematuria catheter was unable to be advanced beyond the prostate and into the bladder.  And angled guidewire was then advanced through the urethra and into the bladder.  A 5 Scottish catheter was advanced over the wire and into the bladder.  The wire was removed and urine drained through the catheter confirming good position.  The wire was replaced through the 5 Scottish catheter and the 5 Scottish catheter was removed.  The 24 Scottish coudé tip hematuria catheter again would not pass into the bladder due to resistance at the level of the prostate.  A 20 Scottish three-way catheter was then advanced over the guidewire and into the bladder without further difficulty.  Approximately 400 mL of pink urine immediately drained.  Patient felt immediate relief.  Maintain Manning catheter upon discharge.  Start Flomax.  The patient may follow-up with me in approximately 1 week for void trial.  Hematuria secondary to catheter trauma. Will not plan for full evaluation at this time.  Wean continuous bladder irrigation to clear then off.  Hand " irrigate with 50 to 100 mL normal saline as needed for clot obstruction.    Melvin Lyons MD  8:41 PM 11/22/2024

## 2024-11-23 NOTE — CARE PLAN
The patient's goals for the shift include sleep, comfort    The clinical goals for the shift include urine will be less bloody by am      Problem: Pain - Adult  Goal: Verbalizes/displays adequate comfort level or baseline comfort level  Outcome: Progressing     Problem: Safety - Adult  Goal: Free from fall injury  Outcome: Progressing     Problem: Discharge Planning  Goal: Discharge to home or other facility with appropriate resources  Outcome: Progressing     Problem: Chronic Conditions and Co-morbidities  Goal: Patient's chronic conditions and co-morbidity symptoms are monitored and maintained or improved  Outcome: Progressing     Problem: Skin  Goal: Decreased wound size/increased tissue granulation at next dressing change  Outcome: Progressing  Goal: Participates in plan/prevention/treatment measures  Outcome: Progressing  Goal: Prevent/manage excess moisture  Outcome: Progressing  Flowsheets (Taken 11/23/2024 1152)  Prevent/manage excess moisture:   Moisturize dry skin   Monitor for/manage infection if present   Cleanse incontinence/protect with barrier cream  Goal: Prevent/minimize sheer/friction injuries  Outcome: Progressing  Goal: Promote/optimize nutrition  Outcome: Progressing  Goal: Promote skin healing  Outcome: Progressing     Problem: Diabetes  Goal: Achieve decreasing blood glucose levels by end of shift  Outcome: Progressing  Goal: Increase stability of blood glucose readings by end of shift  Outcome: Progressing  Goal: Decrease in ketones present in urine by end of shift  Outcome: Progressing  Goal: Maintain electrolyte levels within acceptable range throughout shift  Outcome: Progressing  Goal: Maintain glucose levels >70mg/dl to <250mg/dl throughout shift  Outcome: Progressing  Goal: No changes in neurological exam by end of shift  Outcome: Progressing  Goal: Learn about and adhere to nutrition recommendations by end of shift  Outcome: Progressing  Goal: Vital signs within normal range for age by  end of shift  Outcome: Progressing  Goal: Increase self care and/or family involovement by end of shift  Outcome: Progressing  Goal: Receive DSME education by end of shift  Outcome: Progressing

## 2024-11-23 NOTE — PROGRESS NOTES
Josias Ramirez is a 74 y.o. male on day 0 of admission presenting with Gait disorder.    Subjective   No cp       Objective         Current Facility-Administered Medications:     acetaminophen (Tylenol) tablet 650 mg, 650 mg, oral, q4h PRN **OR** acetaminophen (Tylenol) oral liquid 650 mg, 650 mg, oral, q4h PRN **OR** acetaminophen (Tylenol) suppository 650 mg, 650 mg, rectal, q4h PRN, Elina Jacob APRN-CNP    dextrose 50 % injection 12.5 g, 12.5 g, intravenous, q15 min PRN, Elina Jacob APRN-CNP    dextrose 50 % injection 25 g, 25 g, intravenous, q15 min PRN, Elina Jacob APRN-CNP    flu vaccine, trivalent, preservative free, HIGH-DOSE, age 65y+ (Fluzone), 0.5 mL, intramuscular, During hospitalization, M H MD Heidi    gabapentin (Neurontin) capsule 800 mg, 800 mg, oral, TID, VICTOR M Davis-CNP, 800 mg at 11/23/24 1402    glucagon (Glucagen) injection 1 mg, 1 mg, intramuscular, q15 min PRN, Elina Jacob APRN-CNP    glucagon (Glucagen) injection 1 mg, 1 mg, intramuscular, q15 min PRN, Elina Jacob APRN-CNP    insulin lispro injection 0-5 Units, 0-5 Units, subcutaneous, TID AC, VICTOR M Davis-CNP, 2 Units at 11/23/24 1715    lidocaine 4 % patch 2 patch, 2 patch, transdermal, Daily, Elina Jacob APRN-CNP, 2 patch at 11/23/24 1402    lisinopril tablet 2.5 mg, 2.5 mg, oral, Daily, VICTOR M Davis-CNP, 2.5 mg at 11/23/24 1402    melatonin tablet 3 mg, 3 mg, oral, Nightly PRN, Elina Jacob APRN-CNP    [START ON 11/24/2024] methylPREDNISolone (Medrol) tablet 12 mg, 12 mg, oral, Once **FOLLOWED BY** [START ON 11/24/2024] methylPREDNISolone (Medrol) tablet 8 mg, 8 mg, oral, Once **FOLLOWED BY** [START ON 11/25/2024] methylPREDNISolone (Medrol) tablet 4 mg, 4 mg, oral, Once, VICTOR M Wiley-CNP    neomycin-bacitracnZn-polymyxnB (Neosporin Original) ointment, , Topical, q8h, VICTOR M Brooks-CNP, Given at 11/23/24 1722    nicotine (Nicoderm CQ) 14 mg/24 hr patch 1  "patch, 1 patch, transdermal, Daily, Elina M VICTOR M Jacob-CNP, 1 patch at 11/23/24 1402    nystatin (Mycostatin) 100,000 unit/mL suspension 500,000 Units, 5 mL, Swish & Swallow, 4x daily, Elina M VICTOR M Jacob-CNP, 500,000 Units at 11/23/24 1715    SITagliptin phosphate (Januvia) tablet 100 mg, 100 mg, oral, Daily, ARTEMIO Davis, 100 mg at 11/23/24 1402    sodium chloride 0.9 % irrigation solution 3,000 mL, 3,000 mL, irrigation, Continuous, Melvin Lyons MD, 3,000 mL at 11/22/24 2320    tamsulosin (Flomax) 24 hr capsule 0.4 mg, 0.4 mg, oral, q PM, Melvin Lyons MD, 0.4 mg at 11/22/24 2313    tiZANidine (Zanaflex) tablet 4 mg, 4 mg, oral, BID PRN, CHERIE DavisCNP, 4 mg at 11/23/24 1401       Physical Exam  HENT:      Head: Normocephalic.   Eyes:      Conjunctiva/sclera: Conjunctivae normal.   Cardiovascular:      Rate and Rhythm: Regular rhythm.   Pulmonary:      Breath sounds: Normal breath sounds.   Abdominal:      General: Bowel sounds are normal.      Palpations: Abdomen is soft.   Musculoskeletal:         General: Normal range of motion.   Skin:     General: Skin is warm and dry.   Neurological:      General: No focal deficit present.      Mental Status: He is alert.   Psychiatric:         Behavior: Behavior normal.           Last Recorded Vitals  Blood pressure 117/58, pulse 85, temperature 36.4 °C (97.5 °F), temperature source Temporal, resp. rate 18, height 1.803 m (5' 11\"), weight 59.7 kg (131 lb 9.8 oz), SpO2 98%.  Intake/Output last 3 Shifts:  I/O last 3 completed shifts:  In: 360 (6 mL/kg) [P.O.:360]  Out: 1200 (20.1 mL/kg) [Urine:1200 (0.6 mL/kg/hr)]  Weight: 59.7 kg     Labs:       Results for orders placed or performed during the hospital encounter of 11/22/24 (from the past 24 hours)   POCT GLUCOSE   Result Value Ref Range    POCT Glucose 100 (H) 74 - 99 mg/dL   Basic Metabolic Panel   Result Value Ref Range    Glucose 129 (H) 74 - 99 mg/dL    Sodium 133 (L) 136 - 145 mmol/L    " Potassium 3.7 3.5 - 5.3 mmol/L    Chloride 96 (L) 98 - 107 mmol/L    Bicarbonate 29 21 - 32 mmol/L    Anion Gap 12 10 - 20 mmol/L    Urea Nitrogen 17 6 - 23 mg/dL    Creatinine 0.92 0.50 - 1.30 mg/dL    eGFR 87 >60 mL/min/1.73m*2    Calcium 8.4 (L) 8.6 - 10.3 mg/dL   CBC   Result Value Ref Range    WBC 9.5 4.4 - 11.3 x10*3/uL    nRBC 0.0 0.0 - 0.0 /100 WBCs    RBC 3.10 (L) 4.50 - 5.90 x10*6/uL    Hemoglobin 8.9 (L) 13.5 - 17.5 g/dL    Hematocrit 27.9 (L) 41.0 - 52.0 %    MCV 90 80 - 100 fL    MCH 28.7 26.0 - 34.0 pg    MCHC 31.9 (L) 32.0 - 36.0 g/dL    RDW 17.6 (H) 11.5 - 14.5 %    Platelets 232 150 - 450 x10*3/uL   SST TOP   Result Value Ref Range    Extra Tube Hold for add-ons.    POCT GLUCOSE   Result Value Ref Range    POCT Glucose 209 (H) 74 - 99 mg/dL              Assessment/Plan   Principal Problem:    Gait disorder  Active Problems:    Severe protein-calorie malnutrition (Multi)    Adenocarcinoma of left lung (Multi)    Type 2 diabetes mellitus    Squamous cell carcinoma of nose    Chronic anemia    Gait abnormality      PLAN: Patient's hematuria is slowly improving continue with the bladder irrigation, monitor CBC, med list and labs reviewed, for now continue with current Rx, appreciate input of urology.               Silvestre Lubin MD

## 2024-11-23 NOTE — PROGRESS NOTES
"  Urology Progress Note     Subjective:   Minimal pain.  Hematuria improving.      Current Diet: Adult diet Regular       Patient Vitals for the past 24 hrs:   BP Temp Temp src Pulse Resp SpO2 Height Weight   11/23/24 0800 135/64 36.9 °C (98.4 °F) Temporal 78 16 97 % -- --   11/23/24 0021 143/64 36.8 °C (98.2 °F) Temporal 79 20 98 % -- --   11/22/24 2000 149/62 37.6 °C (99.7 °F) Temporal 90 18 97 % -- --   11/22/24 1851 164/67 37 °C (98.6 °F) Temporal 88 17 98 % -- --   11/22/24 1705 155/65 -- -- 82 16 99 % -- --   11/22/24 1353 141/64 36 °C (96.8 °F) Temporal 81 18 98 % 1.803 m (5' 11\") 59.7 kg (131 lb 9.8 oz)       Intake/Output Summary (Last 24 hours) at 11/23/2024 1146  Last data filed at 11/23/2024 0600  Gross per 24 hour   Intake 360 ml   Output 1200 ml   Net -840 ml       Physical Exam:   NAD  AOx3  Peripheral pulses palpable  Regular rate  Respirations nonlabored, symmetric chest rise bilaterally  Soft, ND, nontender  Manning catheter draining pink urine on slow drip CBI.  No calf tenderness to palpation bilaterally    Lab results  Results from last 7 days   Lab Units 11/23/24  1100 11/22/24  1432 11/19/24  1018   SODIUM mmol/L 133* 131* 135*   POTASSIUM mmol/L 3.7 4.1 3.6   CHLORIDE mmol/L 96* 94* 98   CO2 mmol/L 29 29 30   BUN mg/dL 17 23 19   CREATININE mg/dL 0.92 1.04 1.09   GLUCOSE mg/dL 129* 265* 164*   CALCIUM mg/dL 8.4* 8.5* 7.9*     Results from last 7 days   Lab Units 11/23/24  1100 11/22/24  1432 11/19/24  1018   WBC AUTO x10*3/uL 9.5 8.0 5.7   HEMOGLOBIN g/dL 8.9* 8.7* 8.3*   HEMATOCRIT % 27.9* 28.0* 26.0*   PLATELETS AUTO x10*3/uL 232 230 158       Lab Results   Component Value Date    APPEARANCEU Clear 11/22/2024    COLORU Yellow 11/22/2024    KETONESU TRACE (A) 11/22/2024    PROTUR 20 (TRACE) 11/22/2024    SPECGRAVU 1.019 11/22/2024    UROBILINOGEN Normal 11/22/2024    WBCU 1-5 11/22/2024       Interval Imaging Findings:    Impression:    74-year-old male with urinary retention, benign prostatic " hyperplasia with outflow obstruction, gross hematuria, failure to thrive      Plan:   Hematuria improving.  Wean to off.  Hand irrigate as needed for clot obstruction.  Plan for eventual discharge with catheter in place.  Patient may follow-up with me in 1 week for void trial.    Melvin Lyons MD  11/23/2024  11:46 AM  Pager#: [phone]

## 2024-11-23 NOTE — PROGRESS NOTES
"Nutrition Initial Assessment:   Nutrition Assessment    Reason for Assessment: Enteral assessment/recommendation (TF), Provider consult order    Patient is a 74 y.o. male presenting from home with leg weakness and multiple falls. Had recent hospitalization here on 11/15 after a fall. Plan is for pt to discharge to rehab facility. Pt is undergoing treatment for adenocarcinoma of left lung at [facility].    Past Medical History:   Diagnosis Date    Adenocarcinoma of left lung (Multi)     Cancer (Multi)     Cataracts, bilateral     Chronic anemia     Metastasis (Multi)     Mixed hyperlipidemia     Nonmelanoma skin cancer     Of face, head, right forearm    Parotid lymphadenopathy     Pedal edema     Peptic ulcer disease     Recurrent falls     Squamous cell carcinoma of nose     Tobacco use disorder     Type 2 diabetes mellitus     Vitamin B12 deficiency           Nutrition History:  Energy Intake: Good > 75 %  Food and Nutrient History: Met with pt, up sititng in chair at time of assessment. pt is stating he is very cold and wanting to get back in bed. Pt was seen by dietitian on previous stay (11/15) as well. Nurse stated pt ate well for breakfast this morning. Pt did complain of some mouth pain/mouth sores from cancer treatment. States he is tolerating liquids better; chicken broth, water, juices, boost. States he tries to drink boost a lease twice daily. Pt did receive magic cups on last visit and would like to try those again.  Vitamin/Herbal Supplement Use: None  Food Allergies/Intolerances:  None  GI Symptoms:  none  Oral Problems:  mouth sores from cancer treatment       Anthropometrics:  Height: 180.3 cm (5' 11\")   Weight: 59.7 kg (131 lb 9.8 oz)   BMI (Calculated): 18.36  IBW/kg (Dietitian Calculated): 78.02 kg  Percent of IBW: 76.57 %       Weight History:   11/22/24: 59.7 kg (131 lb 9.8 oz)  11/14/24: 61.2 kg (135 lb)  11/3/24: 67.1 kg (148 lb)  10/2/24: 67.1 kg (148 lb)  9/4/24: 68.6 kg (151 lb)    Weight " Change %:  Weight History / % Weight Change: 3.4% weight loss in 1 week. 11% weight loss over 7 weeks.  Significant Weight Loss: Yes  Interpretation of Weight Loss: >2% in 1 week    Nutrition Focused Physical Exam Findings:  defer: Deferred at pt is wrapped in a blanket stating he is too cold.  Subcutaneous Fat Loss:      Muscle Wasting:     Edema:  Edema Location: +2 RLE, +3 LLE  Physical Findings:       Nutrition Significant Labs:  CBC Trend:   Results from last 7 days   Lab Units 11/23/24  1100 11/22/24  1432 11/19/24  1018   WBC AUTO x10*3/uL 9.5 8.0 5.7   RBC AUTO x10*6/uL 3.10* 3.12* 2.92*   HEMOGLOBIN g/dL 8.9* 8.7* 8.3*   HEMATOCRIT % 27.9* 28.0* 26.0*   MCV fL 90 90 89   PLATELETS AUTO x10*3/uL 232 230 158    , BMP Trend:   Results from last 7 days   Lab Units 11/23/24  1100 11/22/24  1432 11/19/24  1018   GLUCOSE mg/dL 129* 265* 164*   CALCIUM mg/dL 8.4* 8.5* 7.9*   SODIUM mmol/L 133* 131* 135*   POTASSIUM mmol/L 3.7 4.1 3.6   CO2 mmol/L 29 29 30   CHLORIDE mmol/L 96* 94* 98   BUN mg/dL 17 23 19   CREATININE mg/dL 0.92 1.04 1.09        Nutrition Specific Medications:  Scheduled medications  influenza, 0.5 mL, intramuscular, During hospitalization  gabapentin, 800 mg, oral, TID  insulin lispro, 0-5 Units, subcutaneous, TID AC  lidocaine, 2 patch, transdermal, Daily  lisinopril, 2.5 mg, oral, Daily  [START ON 11/24/2024] methylPREDNISolone, 12 mg, oral, Once   Followed by  [START ON 11/24/2024] methylPREDNISolone, 8 mg, oral, Once   Followed by  [START ON 11/25/2024] methylPREDNISolone, 4 mg, oral, Once  neomycin-bacitracnZn-polymyxnB, , Topical, q8h  nicotine, 1 patch, transdermal, Daily  nystatin, 5 mL, Swish & Swallow, 4x daily  SITagliptin phosphate, 100 mg, oral, Daily  tamsulosin, 0.4 mg, oral, q PM      Continuous medications  sodium chloride, 3,000 mL      PRN medications  PRN medications: acetaminophen **OR** acetaminophen **OR** acetaminophen, dextrose, dextrose, glucagon, glucagon, melatonin,  tiZANidine      I/O:    ;      Dietary Orders (From admission, onward)       Start     Ordered    11/23/24 1048  Adult diet Regular  Diet effective now        Question:  Diet type  Answer:  Regular    11/23/24 1047    11/22/24 1858  May Participate in Room Service  ( ROOM SERVICE MAY PARTICIPATE)  Once        Question:  .  Answer:  Yes    11/22/24 1857                     Estimated Needs:   Total Energy Estimated Needs (kCal):  (8082-6511 kcal)  Method for Estimating Needs: 30-35kcal/kg  Total Protein Estimated Needs (g):  (72-90)  Method for Estimating Needs: 1.2-1.5g/kg     Method for Estimating Needs: 1mL/kcal or per physician        Nutrition Diagnosis   Malnutrition Diagnosis  Patient has Malnutrition Diagnosis: Yes  Diagnosis Status: Ongoing  Malnutrition Diagnosis: Severe malnutrition related to acute disease or injury  As Evidenced by: Significant weight loss of 3.4% over 1 week, 11% over 7 weeks and estimate energy intake is meeting <50% of needs            Nutrition Interventions/Recommendations         Nutrition Prescription:  Individualized Nutrition Prescription Provided for : Continue regular diet to optimize oral intakes. Will provide oral supplements of Ensure + HP and magic cups.. If weight loss continues and enteral nutrition is part of patient POC please see below for recommendations.        Nutrition Interventions:   Interventions: Meals and snacks, Enteral intake, Medical food supplement  Meals and Snacks: General healthful diet  Goal: Consume >75% of meals  Enteral Intake: Other (Comment)  Goal: If enteral nutrition is indicated, suggest providing Jevity 1.5 at 50mL/hr x 24 hours per day to provide 1800kcal, 76.5g pro, 912mL fluid. Consider TF flush of 100mL q6hr to provide additional 400mL fluid, adjust pending oral fluid intake/fluid needs.  Medical Food Supplement: Commercial beverage, Commercial food  Goal: Provide Ensure + HP with meals (350kcal, 20g pro each), magic cup with lunch and  dinner (290kcal, 9g pro each)    Collaboration and Referral of Nutrition Care: Collaboration by nutrition professional with other providers  Goal: REBECCA Lantigua    Nutrition Education:          Nutrition Monitoring and Evaluation   Food/Nutrient Related History Monitoring  Monitoring and Evaluation Plan: Energy intake  Energy Intake: Estimated energy intake  Criteria: Consuming >75% of meals and supplements    Body Composition/Growth/Weight History  Monitoring and Evaluation Plan: Weight  Weight: Measured weight  Criteria: Monitor weight weekly for weight changes    Biochemical Data, Medical Tests and Procedures  Monitoring and Evaluation Plan: Electrolyte/renal panel, Glucose/endocrine profile  Electrolyte and Renal Panel: BUN, Creatinine, Magnesium, Potassium, Sodium  Criteria: WNL  Glucose/Endocrine Profile: Glucose, casual, Glucose, fasting  Criteria: BG 70-180mg/dL              Time Spent (min): 45 minutes

## 2024-11-23 NOTE — DISCHARGE INSTRUCTIONS
Please follow-up with Dr. Melvin Lyons in 1 weeks in his office for void trial. Call [phone] for an appointment.

## 2024-11-23 NOTE — PROGRESS NOTES
Physical Therapy    Physical Therapy Evaluation & Treatment    Patient Name: Josias Ramirez  MRN: [id number]  Department: [facility]  Room: 310/3106-A  Today's Date: 11/23/2024   Time Calculation  Start Time: 1012  Stop Time: 1050  Time Calculation (min): 38 min    Assessment/Plan   PT Assessment  PT Assessment Results: Decreased strength, Decreased range of motion, Decreased endurance, Impaired balance, Decreased mobility, Pain  End of Session Communication: Bedside nurse  End of Session Patient Position: Up in chair (call light and all needs in reach)   IP OR SWING BED PT PLAN  Inpatient or Swing Bed: Inpatient  PT Plan  Treatment/Interventions: Bed mobility, Transfer training, Gait training, Stair training, Balance training, Neuromuscular re-education, Strengthening, Endurance training, Therapeutic exercise, Therapeutic activity, Home exercise program  PT Plan: Ongoing PT  PT Frequency: 5 times per week  PT Discharge Recommendations: High intensity level of continued care (Pt mobility limited by pain B lateral thighs, but if pain able to be controlled, rec acute rehab, if pain not controllable, pt may only tolerate SNF)  Equipment Recommended upon Discharge: Wheeled walker  PT - OK to Discharge:  (OK to discharge from acute to next recommended level of care once cleared by medical team.)      Subjective     General Visit Information:  General  Reason for Referral: B LE weakness and multiple falls  Referred By: Elina Jacob, APRN-CNP  Past Medical History Relevant to Rehab: past medical history of Adenocarcinoma of left lung (Multi), Cancer (Multi), Cataracts, bilateral, Chronic anemia, Metastasis (Multi), Mixed hyperlipidemia, Nonmelanoma skin cancer, Parotid lymphadenopathy, Pedal edema, Peptic ulcer disease, Recurrent falls, Squamous cell carcinoma of nose, Tobacco use disorder, Type 2 diabetes mellitus, and Vitamin B12 deficiency.  Patient Position Received: Up in chair  Home Living:  Home Living  Type of  Home: [place]  Lives With:  (100 year old mother and sister)  Home Adaptive Equipment:  (He uses his mother's FWW)  Home Layout: One level  Home Access:  (1 TORI from garage with wall to hold onto)  Prior Level of Function:  Prior Function Per Pt/Caregiver Report  Receives Help From:  (sister and another sister who lives closeby; both are available 24/7)  Ambulatory Assistance:  (ambulates with FWW recently (his mother's FWW); pt reports his mother is 100 and she uses a rollator, so he uses her FWW)  Precautions:  Precautions  Medical Precautions: Fall precautions           Objective   Pain:  Pain Assessment  Pain Assessment: 0-10  0-10 (Numeric) Pain Score: 9 (no pain at rest, pain only with movement of legs)  Pain Location:  (pain B lateral LE with movement of legs/ankles-due to injuries from falls per pt)  Cognition:       General Assessments:  General Observation  General Observation: Kerri               Static Sitting Balance  Static Sitting-Balance Support: Feet supported, No upper extremity supported  Static Sitting-Level of Assistance: Modified independent  Functional Assessments:       Transfer 1  Transfer From 1: Sit to  Transfer to 1: Stand  Technique 1: Sit to stand  Transfer Device 1: Walker  Transfer Level of Assistance 1: Contact guard  Trials/Comments 1: vc's for safe hand placement    Ambulation/Gait Training 1  Surface 1: Level tile  Device 1: Rolling walker  Gait Support Devices: Gait belt  Assistance 1: Contact guard  Quality of Gait 1: Narrow base of support  Comments/Distance (ft) 1: ~15 feet (vc's to stay closer to walker)  Extremity/Trunk Assessments:  RUE AROM (degrees)  RUE AROM Comment: WFL  RUE Strength  RUE Overall Strength: Greater than or equal to 3/5 as evidenced by functional mobility  LUE AROM (degrees)  LUE AROM Comment: WFL  LUE Strength  LUE Overall Strength: Greater than or equal to 3/5 as evidenced by functional mobility  RLE   RLE :  (Limited AROM 2/2 9/10 lateral thigh pain with  all leg movements except hip abd/add-no pain)  LLE   LLE :  (Limited AROM 2/2 9/10 lateral thigh pain with all leg movements except hip abd/add-no pain)  Treatments:       Ambulation/Gait Training 1  Surface 1: Level tile  Device 1: Rolling walker  Gait Support Devices: Gait belt  Assistance 1: Contact guard  Quality of Gait 1: Narrow base of support  Comments/Distance (ft) 1: ~15 feet (vc's to stay closer to walker)  Transfer 1  Transfer From 1: Sit to  Transfer to 1: Stand  Technique 1: Sit to stand  Transfer Device 1: Walker  Transfer Level of Assistance 1: Contact guard  Trials/Comments 1: vc's for safe hand placement  Outcome Measures:  [facility] Basic Mobility  Turning from your back to your side while in a flat bed without using bedrails: None  Moving from lying on your back to sitting on the side of a flat bed without using bedrails: A little  Moving to and from bed to chair (including a wheelchair): A little  Standing up from a chair using your arms (e.g. wheelchair or bedside chair): A little  To walk in hospital room: A little  Climbing 3-5 steps with railing: A lot  Basic Mobility - Total Score: 18    Encounter Problems       Encounter Problems (Active)       Mobility       STG - Patient will ambulate level surface at least 150' using LRAD with supervision. (Progressing)       Start:  11/23/24    Expected End:  12/07/24            Pt will ascend/descend 1 stair without rail with LRAD to simulate entrance to home with supervision. (Progressing)       Start:  11/23/24    Expected End:  12/07/24               PT Transfers       STG - Transfer from bed to chair using LRAD with supervision (Progressing)       Start:  11/23/24    Expected End:  12/07/24            STG - Patient to transfer to and from sit to supine with bed flat, no rails, Vineet (Progressing)       Start:  11/23/24    Expected End:  12/07/24            STG - Patient will transfer sit to and from stand using LRAD [place]et (Progressing)       Start:   11/23/24    Expected End:  12/07/24               Pain - Adult              Education Documentation  Precautions, taught by Susie Garcia, PT at 11/23/2024  3:04 PM.  Learner: Patient  Readiness: Acceptance  Method: Explanation, Demonstration  Response: Needs Reinforcement    Body Mechanics, taught by Susie Garcia, PT at 11/23/2024  3:04 PM.  Learner: Patient  Readiness: Acceptance  Method: Explanation, Demonstration  Response: Needs Reinforcement    Home Exercise Program, taught by Susie Garcia, PT at 11/23/2024  3:04 PM.  Learner: Patient  Readiness: Acceptance  Method: Explanation, Demonstration  Response: Needs Reinforcement    Mobility Training, taught by Susie Garcia, PT at 11/23/2024  3:04 PM.  Learner: Patient  Readiness: Acceptance  Method: Explanation, Demonstration  Response: Needs Reinforcement    Education Comments  No comments found.

## 2024-11-23 NOTE — CARE PLAN
The patient's goals for the shift include      The clinical goals for the shift include monitor vitals, safety, pain cointrol      Problem: Pain - Adult  Goal: Verbalizes/displays adequate comfort level or baseline comfort level  Outcome: Progressing     Problem: Safety - Adult  Goal: Free from fall injury  Outcome: Progressing     Problem: Discharge Planning  Goal: Discharge to home or other facility with appropriate resources  Outcome: Progressing     Problem: Chronic Conditions and Co-morbidities  Goal: Patient's chronic conditions and co-morbidity symptoms are monitored and maintained or improved  Outcome: Progressing     Problem: Skin  Goal: Decreased wound size/increased tissue granulation at next dressing change  Outcome: Progressing  Goal: Participates in plan/prevention/treatment measures  Outcome: Progressing  Goal: Prevent/manage excess moisture  Outcome: Progressing  Flowsheets (Taken 11/22/2024 1900)  Prevent/manage excess moisture:   Moisturize dry skin   Monitor for/manage infection if present  Goal: Prevent/minimize sheer/friction injuries  Outcome: Progressing  Goal: Promote/optimize nutrition  Outcome: Progressing  Goal: Promote skin healing  Outcome: Progressing     Problem: Diabetes  Goal: Achieve decreasing blood glucose levels by end of shift  Outcome: Progressing  Goal: Increase stability of blood glucose readings by end of shift  Outcome: Progressing  Goal: Decrease in ketones present in urine by end of shift  Outcome: Progressing  Goal: Maintain electrolyte levels within acceptable range throughout shift  Outcome: Progressing  Goal: Maintain glucose levels >70mg/dl to <250mg/dl throughout shift  Outcome: Progressing  Goal: No changes in neurological exam by end of shift  Outcome: Progressing  Goal: Learn about and adhere to nutrition recommendations by end of shift  Outcome: Progressing  Goal: Vital signs within normal range for age by end of shift  Outcome: Progressing  Goal: Increase self  care and/or family involovement by end of shift  Outcome: Progressing  Goal: Receive DSME education by end of shift  Outcome: Progressing

## 2024-11-23 NOTE — CARE PLAN
The patient's goals for the shift include sleep, comfort    The clinical goals for the shift include urine will be less bloody by am    Over the shift, the patient did  make progress toward the following goals.  Pt stated he was been comfortable this shift.  Urine presently yasmine, no  clots noted.  Irrigation running at slow drip as per dr's request.  Safety measures maintained.

## 2024-11-23 NOTE — CARE PLAN
Problem: Mobility  Goal: STG - Patient will ambulate level surface at least 150' using LRAD with supervision.  Outcome: Progressing  Goal: Pt will ascend/descend 1 stair without rail with LRAD to simulate entrance to home with supervision.  Outcome: Progressing     Problem: PT Transfers  Goal: STG - Transfer from bed to chair using LRAD with supervision  Outcome: Progressing  Goal: STG - Patient to transfer to and from sit to supine with bed flat, no rails, Vineet  Outcome: Progressing  Goal: STG - Patient will transfer sit to and from stand using LRAD [place]et  Outcome: Progressing

## 2024-11-24 ENCOUNTER — HOME CARE VISIT (OUTPATIENT)
Dept: HOME HEALTH SERVICES | Facility: HOME HEALTH | Age: 74
End: 2024-11-24
Payer: COMMERCIAL

## 2024-11-24 VITALS
RESPIRATION RATE: 16 BRPM | SYSTOLIC BLOOD PRESSURE: 117 MMHG | OXYGEN SATURATION: 99 % | TEMPERATURE: 98.4 F | HEART RATE: 77 BPM | DIASTOLIC BLOOD PRESSURE: 68 MMHG | BODY MASS INDEX: 18.43 KG/M2 | WEIGHT: 131.61 LBS | HEIGHT: 71 IN

## 2024-11-24 PROBLEM — R62.7 FAILURE TO THRIVE IN ADULT: Status: ACTIVE | Noted: 2024-11-24

## 2024-11-24 LAB
ABO GROUP (TYPE) IN BLOOD: NORMAL
ABO GROUP (TYPE) IN BLOOD: NORMAL
ANION GAP SERPL CALC-SCNC: 9 MMOL/L (ref 10–20)
ANTIBODY SCREEN: NORMAL
BLOOD EXPIRATION DATE: NORMAL
BUN SERPL-MCNC: 14 MG/DL (ref 6–23)
CALCIUM SERPL-MCNC: 7.5 MG/DL (ref 8.6–10.3)
CHLORIDE SERPL-SCNC: 97 MMOL/L (ref 98–107)
CO2 SERPL-SCNC: 31 MMOL/L (ref 21–32)
CREAT SERPL-MCNC: 0.82 MG/DL (ref 0.5–1.3)
DISPENSE STATUS: NORMAL
EGFRCR SERPLBLD CKD-EPI 2021: >90 ML/MIN/1.73M*2
ERYTHROCYTE [DISTWIDTH] IN BLOOD BY AUTOMATED COUNT: 17.6 % (ref 11.5–14.5)
GLUCOSE BLD MANUAL STRIP-MCNC: 159 MG/DL (ref 74–99)
GLUCOSE BLD MANUAL STRIP-MCNC: 173 MG/DL (ref 74–99)
GLUCOSE BLD MANUAL STRIP-MCNC: 183 MG/DL (ref 74–99)
GLUCOSE SERPL-MCNC: 117 MG/DL (ref 74–99)
HCT VFR BLD AUTO: 20 % (ref 41–52)
HCT VFR BLD AUTO: 28.5 % (ref 41–52)
HGB BLD-MCNC: 6.5 G/DL (ref 13.5–17.5)
HGB BLD-MCNC: 9.2 G/DL (ref 13.5–17.5)
MCH RBC QN AUTO: 28.8 PG (ref 26–34)
MCHC RBC AUTO-ENTMCNC: 32.5 G/DL (ref 32–36)
MCV RBC AUTO: 89 FL (ref 80–100)
NRBC BLD-RTO: 0 /100 WBCS (ref 0–0)
PLATELET # BLD AUTO: 179 X10*3/UL (ref 150–450)
POTASSIUM SERPL-SCNC: 3.5 MMOL/L (ref 3.5–5.3)
PRODUCT BLOOD TYPE: 9500
PRODUCT CODE: NORMAL
RBC # BLD AUTO: 2.26 X10*6/UL (ref 4.5–5.9)
RH FACTOR (ANTIGEN D): NORMAL
RH FACTOR (ANTIGEN D): NORMAL
SODIUM SERPL-SCNC: 133 MMOL/L (ref 136–145)
UNIT ABO: NORMAL
UNIT NUMBER: NORMAL
UNIT RH: NORMAL
UNIT VOLUME: 350
WBC # BLD AUTO: 6.7 X10*3/UL (ref 4.4–11.3)
XM INTEP: NORMAL

## 2024-11-24 PROCEDURE — 85027 COMPLETE CBC AUTOMATED: CPT | Performed by: NURSE PRACTITIONER

## 2024-11-24 PROCEDURE — 82947 ASSAY GLUCOSE BLOOD QUANT: CPT

## 2024-11-24 PROCEDURE — 2500000002 HC RX 250 W HCPCS SELF ADMINISTERED DRUGS (ALT 637 FOR MEDICARE OP, ALT 636 FOR OP/ED): Performed by: UROLOGY

## 2024-11-24 PROCEDURE — 36415 COLL VENOUS BLD VENIPUNCTURE: CPT | Performed by: NURSE PRACTITIONER

## 2024-11-24 PROCEDURE — 36430 TRANSFUSION BLD/BLD COMPNT: CPT

## 2024-11-24 PROCEDURE — 2500000004 HC RX 250 GENERAL PHARMACY W/ HCPCS (ALT 636 FOR OP/ED): Performed by: NURSE PRACTITIONER

## 2024-11-24 PROCEDURE — S4991 NICOTINE PATCH NONLEGEND: HCPCS | Performed by: NURSE PRACTITIONER

## 2024-11-24 PROCEDURE — 2500000002 HC RX 250 W HCPCS SELF ADMINISTERED DRUGS (ALT 637 FOR MEDICARE OP, ALT 636 FOR OP/ED): Performed by: NURSE PRACTITIONER

## 2024-11-24 PROCEDURE — 1100000001 HC PRIVATE ROOM DAILY

## 2024-11-24 PROCEDURE — 86901 BLOOD TYPING SEROLOGIC RH(D): CPT | Performed by: NURSE PRACTITIONER

## 2024-11-24 PROCEDURE — 85014 HEMATOCRIT: CPT | Performed by: NURSE PRACTITIONER

## 2024-11-24 PROCEDURE — 2500000001 HC RX 250 WO HCPCS SELF ADMINISTERED DRUGS (ALT 637 FOR MEDICARE OP): Performed by: NURSE PRACTITIONER

## 2024-11-24 PROCEDURE — 86920 COMPATIBILITY TEST SPIN: CPT

## 2024-11-24 PROCEDURE — 2500000005 HC RX 250 GENERAL PHARMACY W/O HCPCS: Performed by: NURSE PRACTITIONER

## 2024-11-24 PROCEDURE — 82374 ASSAY BLOOD CARBON DIOXIDE: CPT | Performed by: NURSE PRACTITIONER

## 2024-11-24 PROCEDURE — P9016 RBC LEUKOCYTES REDUCED: HCPCS

## 2024-11-24 RX ORDER — METHYLPREDNISOLONE 4 MG/1
4 TABLET ORAL ONCE
Status: COMPLETED | OUTPATIENT
Start: 2024-11-26 | End: 2024-11-26

## 2024-11-24 RX ORDER — POTASSIUM CHLORIDE 20 MEQ/1
20 TABLET, EXTENDED RELEASE ORAL ONCE
Status: COMPLETED | OUTPATIENT
Start: 2024-11-24 | End: 2024-11-24

## 2024-11-24 RX ORDER — METHYLPREDNISOLONE 4 MG/1
8 TABLET ORAL ONCE
Status: COMPLETED | OUTPATIENT
Start: 2024-11-25 | End: 2024-11-25

## 2024-11-24 ASSESSMENT — COGNITIVE AND FUNCTIONAL STATUS - GENERAL
EATING MEALS: A LITTLE
DRESSING REGULAR LOWER BODY CLOTHING: A LITTLE
DAILY ACTIVITIY SCORE: 18
MOBILITY SCORE: 20
MOBILITY SCORE: 20
MOVING TO AND FROM BED TO CHAIR: A LITTLE
DRESSING REGULAR LOWER BODY CLOTHING: A LITTLE
STANDING UP FROM CHAIR USING ARMS: A LITTLE
WALKING IN HOSPITAL ROOM: A LITTLE
DRESSING REGULAR UPPER BODY CLOTHING: A LITTLE
DAILY ACTIVITIY SCORE: 18
TOILETING: A LITTLE
PERSONAL GROOMING: A LITTLE
DRESSING REGULAR UPPER BODY CLOTHING: A LITTLE
HELP NEEDED FOR BATHING: A LITTLE
CLIMB 3 TO 5 STEPS WITH RAILING: A LOT
WALKING IN HOSPITAL ROOM: A LITTLE
STANDING UP FROM CHAIR USING ARMS: A LITTLE
PERSONAL GROOMING: A LITTLE
CLIMB 3 TO 5 STEPS WITH RAILING: A LITTLE
EATING MEALS: A LITTLE
HELP NEEDED FOR BATHING: A LITTLE
TOILETING: A LITTLE

## 2024-11-24 ASSESSMENT — PAIN - FUNCTIONAL ASSESSMENT
PAIN_FUNCTIONAL_ASSESSMENT: 0-10
PAIN_FUNCTIONAL_ASSESSMENT: 0-10

## 2024-11-24 ASSESSMENT — PAIN DESCRIPTION - LOCATION
LOCATION: BACK
LOCATION: LEG

## 2024-11-24 ASSESSMENT — PAIN DESCRIPTION - ORIENTATION: ORIENTATION: OTHER (COMMENT)

## 2024-11-24 ASSESSMENT — PAIN SCALES - GENERAL
PAINLEVEL_OUTOF10: 3
PAINLEVEL_OUTOF10: 7
PAINLEVEL_OUTOF10: 0 - NO PAIN
PAINLEVEL_OUTOF10: 2

## 2024-11-24 NOTE — PROGRESS NOTES
Urology Progress Note     Subjective:   Continues to deny pain.  Hematuria resolved.    Current Diet: Adult diet Regular       Patient Vitals for the past 24 hrs:   BP Temp Temp src Pulse Resp SpO2   11/24/24 0420 149/67 37.2 °C (99 °F) Temporal 80 17 97 %   11/23/24 2000 110/53 36.9 °C (98.4 °F) Temporal 55 18 97 %   11/23/24 1600 117/58 36.4 °C (97.5 °F) Temporal 85 18 98 %       Intake/Output Summary (Last 24 hours) at 11/24/2024 0832  Last data filed at 11/24/2024 0500  Gross per 24 hour   Intake --   Output 3600 ml   Net -3600 ml       Physical Exam:   NAD  AOx3  Peripheral pulses palpable  Regular rate  Respirations nonlabored, symmetric chest rise bilaterally  Soft, ND, nontender  Manning catheter draining clear yellow urine off CBI.  No calf tenderness to palpation bilaterally    Lab results  Results from last 7 days   Lab Units 11/24/24  0550 11/23/24  1100 11/22/24  1432   SODIUM mmol/L 133* 133* 131*   POTASSIUM mmol/L 3.5 3.7 4.1   CHLORIDE mmol/L 97* 96* 94*   CO2 mmol/L 31 29 29   BUN mg/dL 14 17 23   CREATININE mg/dL 0.82 0.92 1.04   GLUCOSE mg/dL 117* 129* 265*   CALCIUM mg/dL 7.5* 8.4* 8.5*     Results from last 7 days   Lab Units 11/24/24  0550 11/23/24  1100 11/22/24  1432   WBC AUTO x10*3/uL 6.7 9.5 8.0   HEMOGLOBIN g/dL 6.5* 8.9* 8.7*   HEMATOCRIT % 20.0* 27.9* 28.0*   PLATELETS AUTO x10*3/uL 179 232 230       Lab Results   Component Value Date    APPEARANCEU Clear 11/22/2024    COLORU Yellow 11/22/2024    KETONESU TRACE (A) 11/22/2024    PROTUR 20 (TRACE) 11/22/2024    SPECGRAVU 1.019 11/22/2024    UROBILINOGEN Normal 11/22/2024    WBCU 1-5 11/22/2024       Interval Imaging Findings:    Impression:    74-year-old male with urinary retention, benign prostatic hyperplasia with outflow obstruction, gross hematuria, failure to thrive      Plan:   Hematuria resolved.  Clamp CBI.  Patient may follow-up in my office in 1 week for void trial.    Melvin Lyons MD  11/24/2024  8:32 AM  Pager#:  [phone]

## 2024-11-24 NOTE — CARE PLAN
Medical house staff was paged by nursing staff RN to consent the patient for a blood transfusion. The patients Hgb is 6.5 today. Dr. Lubin has ordered 1U of PRBC and has requested medical house staff to perform the informed consent.  I explain the benefits and risks of the transfusion including infection and reaction to the blood including death. The patient expresses an understanding of the risks and benefits and wishes to proceed with the transfusion. Consent for signed and placed on chart.

## 2024-11-24 NOTE — NURSING NOTE
1844: received report from bryan FERNANDEZ on 3 obs.     1854: pt arrived to unit in stable condition. Pt reports no pain, and situated in room. Bed alarm on and functioning. Pt alert and orientedx4.

## 2024-11-24 NOTE — CARE PLAN
The patient's goals for the shift include sleep, comfort    The clinical goals for the shift include therapy, urology follow up

## 2024-11-24 NOTE — CARE PLAN
The patient's goals for the shift include sleep, comfort    The clinical goals for the shift include continuous urinary irrigation with lessening blood in urine noted at shift end. Over the shift, the patient did make progress toward the previously stated goals. Barriers to progression include patient is weak. Recommendations to address these barriers include patient is to discharge to rehab unit.

## 2024-11-24 NOTE — PROGRESS NOTES
Josias Ramirez is a 74 y.o. male on day 0 of admission presenting with Gait disorder.    Subjective   No cp       Objective         Current Facility-Administered Medications:     acetaminophen (Tylenol) tablet 650 mg, 650 mg, oral, q4h PRN, 650 mg at 11/24/24 0420 **OR** acetaminophen (Tylenol) oral liquid 650 mg, 650 mg, oral, q4h PRN **OR** acetaminophen (Tylenol) suppository 650 mg, 650 mg, rectal, q4h PRN, Elina Jacob APRN-CNP    dextrose 50 % injection 12.5 g, 12.5 g, intravenous, q15 min PRN, Elina Jacob APRN-CNP    dextrose 50 % injection 25 g, 25 g, intravenous, q15 min PRN, Elina Jacob APRN-CNP    flu vaccine, trivalent, preservative free, HIGH-DOSE, age 65y+ (Fluzone), 0.5 mL, intramuscular, During hospitalization, RAMILA Gordon MD    gabapentin (Neurontin) capsule 800 mg, 800 mg, oral, TID, Elina Jacob APRN-CNP, 800 mg at 11/24/24 1751    glucagon (Glucagen) injection 1 mg, 1 mg, intramuscular, q15 min PRN, Elina Jacob APRN-CNP    glucagon (Glucagen) injection 1 mg, 1 mg, intramuscular, q15 min PRN, Elina Figueredorici, APRN-CNP    insulin lispro injection 0-5 Units, 0-5 Units, subcutaneous, TID AC, Elina Galvani APRN-CNP, 1 Units at 11/24/24 1752    lidocaine 4 % patch 2 patch, 2 patch, transdermal, Daily, Elina Jacob APRN-CNP, 2 patch at 11/24/24 1114    lisinopril tablet 2.5 mg, 2.5 mg, oral, Daily, Elina Jacob APRN-CNP, 2.5 mg at 11/24/24 1043    melatonin tablet 3 mg, 3 mg, oral, Nightly PRN, Elina Jacob APRN-CNP    [START ON 11/26/2024] methylPREDNISolone (Medrol) tablet 4 mg, 4 mg, oral, Once, Dottie Green, PharmD    [START ON 11/25/2024] methylPREDNISolone (Medrol) tablet 8 mg, 8 mg, oral, Once, Dottie Green, PharmD    neomycin-bacitracnZn-polymyxnB (Neosporin Original) ointment, , Topical, q8h, VICTOR M Brooks-CNP, Given at 11/24/24 4076    nicotine (Nicoderm CQ) 14 mg/24 hr patch 1 patch, 1 patch, transdermal, Daily, Elina Jacob,  "VICTOR M-CNP, 1 patch at 11/24/24 1015    nystatin (Mycostatin) 100,000 unit/mL suspension 500,000 Units, 5 mL, Swish & Swallow, 4x daily, CHERIE DavisCNP, 500,000 Units at 11/24/24 1751    SITagliptin phosphate (Januvia) tablet 100 mg, 100 mg, oral, Daily, ARTEMIO Davis, 100 mg at 11/24/24 1043    sodium chloride 0.9 % irrigation solution 3,000 mL, 3,000 mL, irrigation, Continuous, Melvin Lyons MD, 3,000 mL at 11/22/24 2320    tamsulosin (Flomax) 24 hr capsule 0.4 mg, 0.4 mg, oral, q PM, Melvin Lyons MD, 0.4 mg at 11/23/24 2052    tiZANidine (Zanaflex) tablet 4 mg, 4 mg, oral, BID PRN, ARTEMIO Davis, 4 mg at 11/24/24 0420       Physical Exam  HENT:      Head: Normocephalic.      Mouth/Throat:      Pharynx: Oropharynx is clear.   Eyes:      Conjunctiva/sclera: Conjunctivae normal.   Cardiovascular:      Rate and Rhythm: Regular rhythm.   Pulmonary:      Breath sounds: Normal breath sounds.   Abdominal:      General: Bowel sounds are normal.      Palpations: Abdomen is soft.   Musculoskeletal:         General: Normal range of motion.   Skin:     General: Skin is warm and dry.   Neurological:      General: No focal deficit present.      Mental Status: He is alert.   Psychiatric:         Behavior: Behavior normal.           Last Recorded Vitals  Blood pressure 142/65, pulse 76, temperature 36.8 °C (98.2 °F), temperature source Temporal, resp. rate 16, height 1.803 m (5' 11\"), weight 59.7 kg (131 lb 9.8 oz), SpO2 100%.  Intake/Output last 3 Shifts:  I/O last 3 completed shifts:  In: 360 (6 mL/kg) [P.O.:360]  Out: 4800 (80.4 mL/kg) [Urine:4800 (2.2 mL/kg/hr)]  Weight: 59.7 kg     Labs:       Results for orders placed or performed during the hospital encounter of 11/22/24 (from the past 24 hours)   Basic metabolic panel   Result Value Ref Range    Glucose 117 (H) 74 - 99 mg/dL    Sodium 133 (L) 136 - 145 mmol/L    Potassium 3.5 3.5 - 5.3 mmol/L    Chloride 97 (L) 98 - 107 mmol/L    " Bicarbonate 31 21 - 32 mmol/L    Anion Gap 9 (L) 10 - 20 mmol/L    Urea Nitrogen 14 6 - 23 mg/dL    Creatinine 0.82 0.50 - 1.30 mg/dL    eGFR >90 >60 mL/min/1.73m*2    Calcium 7.5 (L) 8.6 - 10.3 mg/dL   CBC   Result Value Ref Range    WBC 6.7 4.4 - 11.3 x10*3/uL    nRBC 0.0 0.0 - 0.0 /100 WBCs    RBC 2.26 (L) 4.50 - 5.90 x10*6/uL    Hemoglobin 6.5 (LL) 13.5 - 17.5 g/dL    Hematocrit 20.0 (L) 41.0 - 52.0 %    MCV 89 80 - 100 fL    MCH 28.8 26.0 - 34.0 pg    MCHC 32.5 32.0 - 36.0 g/dL    RDW 17.6 (H) 11.5 - 14.5 %    Platelets 179 150 - 450 x10*3/uL   Type and screen   Result Value Ref Range    ABO TYPE O     Rh TYPE POS     ANTIBODY SCREEN NEG    Prepare RBC: 1 Units   Result Value Ref Range    PRODUCT CODE D0869V46     Unit Number [id number]     Unit ABO O     Unit RH NEG     XM INTEP COMP     Dispense Status IS     Blood Expiration Date 12/8/2024 11:59:00 PM EST     PRODUCT BLOOD TYPE 9500     UNIT VOLUME 350    Verify ABO/Rh Group Test (VERAB)   Result Value Ref Range    ABO TYPE O     Rh TYPE POS    POCT GLUCOSE   Result Value Ref Range    POCT Glucose 173 (H) 74 - 99 mg/dL   POCT GLUCOSE   Result Value Ref Range    POCT Glucose 159 (H) 74 - 99 mg/dL   Hemoglobin and hematocrit, blood   Result Value Ref Range    Hemoglobin 9.2 (L) 13.5 - 17.5 g/dL    Hematocrit 28.5 (L) 41.0 - 52.0 %   POCT GLUCOSE   Result Value Ref Range    POCT Glucose 183 (H) 74 - 99 mg/dL              Assessment/Plan   Principal Problem:    Gait disorder  Active Problems:    Severe protein-calorie malnutrition (Multi)    Adenocarcinoma of left lung (Multi)    Type 2 diabetes mellitus    Squamous cell carcinoma of nose    Chronic anemia    Gait abnormality    Failure to thrive in adult        PLAN: Patient was noted to have drop in H&H, 1 unit of PRBC was ordered, monitor H&H, med list and labs reviewed, for now continue with other supportive care, rest with CNP.           Silvestre Lubin MD

## 2024-11-24 NOTE — PROGRESS NOTES
Occupational Therapy                 Therapy Communication Note    Patient Name: Josias Ramirez  MRN: [id number]  Department: [facility] 3 S OBS  Room: 3106/3106-A  Today's Date: 11/24/2024     Discipline: Occupational Therapy    Missed Visit Reason:  OT orders received and chart reviewed. Hemoglobin 6.5, will hold and attempt again as medically appropriate.     Missed Time: Attempt    Comment:

## 2024-11-25 ENCOUNTER — HOME CARE VISIT (OUTPATIENT)
Dept: HOME HEALTH SERVICES | Facility: HOME HEALTH | Age: 74
End: 2024-11-25
Payer: COMMERCIAL

## 2024-11-25 ENCOUNTER — APPOINTMENT (OUTPATIENT)
Dept: RADIOLOGY | Facility: HOSPITAL | Age: 74
End: 2024-11-25
Payer: COMMERCIAL

## 2024-11-25 LAB
ALBUMIN SERPL BCP-MCNC: 2.2 G/DL (ref 3.4–5)
ALP SERPL-CCNC: 60 U/L (ref 33–136)
ALT SERPL W P-5'-P-CCNC: 11 U/L (ref 10–52)
ANION GAP SERPL CALC-SCNC: 8 MMOL/L (ref 10–20)
ANION GAP SERPL CALC-SCNC: 9 MMOL/L (ref 10–20)
APPEARANCE UR: CLEAR
AST SERPL W P-5'-P-CCNC: 10 U/L (ref 9–39)
BILIRUB SERPL-MCNC: 0.3 MG/DL (ref 0–1.2)
BILIRUB UR STRIP.AUTO-MCNC: NEGATIVE MG/DL
BLOOD EXPIRATION DATE: NORMAL
BUN SERPL-MCNC: 11 MG/DL (ref 6–23)
BUN SERPL-MCNC: 13 MG/DL (ref 6–23)
CALCIUM SERPL-MCNC: 7.4 MG/DL (ref 8.6–10.3)
CALCIUM SERPL-MCNC: 7.6 MG/DL (ref 8.6–10.3)
CHLORIDE SERPL-SCNC: 98 MMOL/L (ref 98–107)
CHLORIDE SERPL-SCNC: 99 MMOL/L (ref 98–107)
CO2 SERPL-SCNC: 30 MMOL/L (ref 21–32)
CO2 SERPL-SCNC: 31 MMOL/L (ref 21–32)
COLOR UR: YELLOW
CREAT SERPL-MCNC: 0.73 MG/DL (ref 0.5–1.3)
CREAT SERPL-MCNC: 0.96 MG/DL (ref 0.5–1.3)
DISPENSE STATUS: NORMAL
EGFRCR SERPLBLD CKD-EPI 2021: 83 ML/MIN/1.73M*2
EGFRCR SERPLBLD CKD-EPI 2021: >90 ML/MIN/1.73M*2
ERYTHROCYTE [DISTWIDTH] IN BLOOD BY AUTOMATED COUNT: 16.6 % (ref 11.5–14.5)
ERYTHROCYTE [DISTWIDTH] IN BLOOD BY AUTOMATED COUNT: 16.9 % (ref 11.5–14.5)
GLUCOSE BLD MANUAL STRIP-MCNC: 141 MG/DL (ref 74–99)
GLUCOSE BLD MANUAL STRIP-MCNC: 226 MG/DL (ref 74–99)
GLUCOSE BLD MANUAL STRIP-MCNC: 287 MG/DL (ref 74–99)
GLUCOSE BLD MANUAL STRIP-MCNC: 291 MG/DL (ref 74–99)
GLUCOSE SERPL-MCNC: 143 MG/DL (ref 74–99)
GLUCOSE SERPL-MCNC: 222 MG/DL (ref 74–99)
GLUCOSE UR STRIP.AUTO-MCNC: ABNORMAL MG/DL
HCT VFR BLD AUTO: 22.8 % (ref 41–52)
HCT VFR BLD AUTO: 26.1 % (ref 41–52)
HGB BLD-MCNC: 7.5 G/DL (ref 13.5–17.5)
HGB BLD-MCNC: 8.5 G/DL (ref 13.5–17.5)
HYALINE CASTS #/AREA URNS AUTO: ABNORMAL /LPF
INR PPP: 1.1 (ref 0.9–1.1)
KETONES UR STRIP.AUTO-MCNC: ABNORMAL MG/DL
LACTATE SERPL-SCNC: 2 MMOL/L (ref 0.4–2)
LACTATE SERPL-SCNC: 2.6 MMOL/L (ref 0.4–2)
LEUKOCYTE ESTERASE UR QL STRIP.AUTO: NEGATIVE
MCH RBC QN AUTO: 28.7 PG (ref 26–34)
MCH RBC QN AUTO: 29.4 PG (ref 26–34)
MCHC RBC AUTO-ENTMCNC: 32.6 G/DL (ref 32–36)
MCHC RBC AUTO-ENTMCNC: 32.9 G/DL (ref 32–36)
MCV RBC AUTO: 88 FL (ref 80–100)
MCV RBC AUTO: 89 FL (ref 80–100)
MUCOUS THREADS #/AREA URNS AUTO: ABNORMAL /LPF
NITRITE UR QL STRIP.AUTO: NEGATIVE
NRBC BLD-RTO: 0 /100 WBCS (ref 0–0)
NRBC BLD-RTO: 0 /100 WBCS (ref 0–0)
PH UR STRIP.AUTO: 5.5 [PH]
PLATELET # BLD AUTO: 164 X10*3/UL (ref 150–450)
PLATELET # BLD AUTO: 172 X10*3/UL (ref 150–450)
POTASSIUM SERPL-SCNC: 3.6 MMOL/L (ref 3.5–5.3)
POTASSIUM SERPL-SCNC: 4.2 MMOL/L (ref 3.5–5.3)
PRODUCT BLOOD TYPE: 9500
PRODUCT CODE: NORMAL
PROT SERPL-MCNC: 3.9 G/DL (ref 6.4–8.2)
PROT UR STRIP.AUTO-MCNC: ABNORMAL MG/DL
PROTHROMBIN TIME: 12.5 SECONDS (ref 9.8–12.8)
RBC # BLD AUTO: 2.55 X10*6/UL (ref 4.5–5.9)
RBC # BLD AUTO: 2.96 X10*6/UL (ref 4.5–5.9)
RBC # UR STRIP.AUTO: ABNORMAL /UL
RBC #/AREA URNS AUTO: ABNORMAL /HPF
SODIUM SERPL-SCNC: 132 MMOL/L (ref 136–145)
SODIUM SERPL-SCNC: 135 MMOL/L (ref 136–145)
SP GR UR STRIP.AUTO: 1.02
UNIT ABO: NORMAL
UNIT NUMBER: NORMAL
UNIT RH: NORMAL
UNIT VOLUME: 350
UROBILINOGEN UR STRIP.AUTO-MCNC: NORMAL MG/DL
WBC # BLD AUTO: 6.1 X10*3/UL (ref 4.4–11.3)
WBC # BLD AUTO: 6.1 X10*3/UL (ref 4.4–11.3)
WBC #/AREA URNS AUTO: ABNORMAL /HPF
XM INTEP: NORMAL

## 2024-11-25 PROCEDURE — 36415 COLL VENOUS BLD VENIPUNCTURE: CPT | Performed by: PHYSICIAN ASSISTANT

## 2024-11-25 PROCEDURE — 97165 OT EVAL LOW COMPLEX 30 MIN: CPT | Mod: GO

## 2024-11-25 PROCEDURE — 85610 PROTHROMBIN TIME: CPT | Performed by: PHYSICIAN ASSISTANT

## 2024-11-25 PROCEDURE — 2500000004 HC RX 250 GENERAL PHARMACY W/ HCPCS (ALT 636 FOR OP/ED): Performed by: PHYSICIAN ASSISTANT

## 2024-11-25 PROCEDURE — 87040 BLOOD CULTURE FOR BACTERIA: CPT | Mod: STJLAB | Performed by: PHYSICIAN ASSISTANT

## 2024-11-25 PROCEDURE — 97116 GAIT TRAINING THERAPY: CPT | Mod: GP,CQ

## 2024-11-25 PROCEDURE — 82947 ASSAY GLUCOSE BLOOD QUANT: CPT

## 2024-11-25 PROCEDURE — 2500000005 HC RX 250 GENERAL PHARMACY W/O HCPCS: Performed by: NURSE PRACTITIONER

## 2024-11-25 PROCEDURE — 97110 THERAPEUTIC EXERCISES: CPT | Mod: GP,CQ

## 2024-11-25 PROCEDURE — 85027 COMPLETE CBC AUTOMATED: CPT | Performed by: PHYSICIAN ASSISTANT

## 2024-11-25 PROCEDURE — 2500000002 HC RX 250 W HCPCS SELF ADMINISTERED DRUGS (ALT 637 FOR MEDICARE OP, ALT 636 FOR OP/ED): Performed by: UROLOGY

## 2024-11-25 PROCEDURE — 83605 ASSAY OF LACTIC ACID: CPT | Performed by: PHYSICIAN ASSISTANT

## 2024-11-25 PROCEDURE — 80053 COMPREHEN METABOLIC PANEL: CPT | Performed by: NURSE PRACTITIONER

## 2024-11-25 PROCEDURE — 71045 X-RAY EXAM CHEST 1 VIEW: CPT | Performed by: RADIOLOGY

## 2024-11-25 PROCEDURE — 80048 BASIC METABOLIC PNL TOTAL CA: CPT | Mod: CCI | Performed by: PHYSICIAN ASSISTANT

## 2024-11-25 PROCEDURE — 2060000001 HC INTERMEDIATE ICU ROOM DAILY

## 2024-11-25 PROCEDURE — 2500000002 HC RX 250 W HCPCS SELF ADMINISTERED DRUGS (ALT 637 FOR MEDICARE OP, ALT 636 FOR OP/ED): Performed by: NURSE PRACTITIONER

## 2024-11-25 PROCEDURE — 71045 X-RAY EXAM CHEST 1 VIEW: CPT

## 2024-11-25 PROCEDURE — 36415 COLL VENOUS BLD VENIPUNCTURE: CPT | Performed by: NURSE PRACTITIONER

## 2024-11-25 PROCEDURE — 81001 URINALYSIS AUTO W/SCOPE: CPT | Performed by: PHYSICIAN ASSISTANT

## 2024-11-25 PROCEDURE — 2500000001 HC RX 250 WO HCPCS SELF ADMINISTERED DRUGS (ALT 637 FOR MEDICARE OP): Performed by: NURSE PRACTITIONER

## 2024-11-25 PROCEDURE — S4991 NICOTINE PATCH NONLEGEND: HCPCS | Performed by: NURSE PRACTITIONER

## 2024-11-25 PROCEDURE — 2500000004 HC RX 250 GENERAL PHARMACY W/ HCPCS (ALT 636 FOR OP/ED): Performed by: ADVANCED PRACTICE MIDWIFE

## 2024-11-25 PROCEDURE — 85027 COMPLETE CBC AUTOMATED: CPT | Performed by: NURSE PRACTITIONER

## 2024-11-25 RX ORDER — POTASSIUM CHLORIDE 20 MEQ/1
20 TABLET, EXTENDED RELEASE ORAL ONCE
Status: COMPLETED | OUTPATIENT
Start: 2024-11-25 | End: 2024-11-25

## 2024-11-25 RX ORDER — SODIUM CHLORIDE 9 MG/ML
100 INJECTION, SOLUTION INTRAVENOUS CONTINUOUS
Status: ACTIVE | OUTPATIENT
Start: 2024-11-26 | End: 2024-11-26

## 2024-11-25 RX ORDER — WATER
400 LIQUID (ML) MISCELLANEOUS
Status: DISCONTINUED | OUTPATIENT
Start: 2024-11-25 | End: 2024-11-26

## 2024-11-25 ASSESSMENT — COGNITIVE AND FUNCTIONAL STATUS - GENERAL
HELP NEEDED FOR BATHING: A LITTLE
TOILETING: A LITTLE
CLIMB 3 TO 5 STEPS WITH RAILING: A LOT
DRESSING REGULAR LOWER BODY CLOTHING: A LITTLE
STANDING UP FROM CHAIR USING ARMS: A LITTLE
STANDING UP FROM CHAIR USING ARMS: A LITTLE
DRESSING REGULAR UPPER BODY CLOTHING: A LITTLE
MOBILITY SCORE: 20
HELP NEEDED FOR BATHING: A LITTLE
PERSONAL GROOMING: A LITTLE
TURNING FROM BACK TO SIDE WHILE IN FLAT BAD: A LITTLE
DRESSING REGULAR UPPER BODY CLOTHING: A LITTLE
DAILY ACTIVITIY SCORE: 20
MOVING FROM LYING ON BACK TO SITTING ON SIDE OF FLAT BED WITH BEDRAILS: A LITTLE
WALKING IN HOSPITAL ROOM: A LITTLE
MOVING TO AND FROM BED TO CHAIR: A LITTLE
DAILY ACTIVITIY SCORE: 19
WALKING IN HOSPITAL ROOM: A LITTLE
MOVING TO AND FROM BED TO CHAIR: A LITTLE
MOBILITY SCORE: 17
DRESSING REGULAR LOWER BODY CLOTHING: A LITTLE
TOILETING: A LITTLE
CLIMB 3 TO 5 STEPS WITH RAILING: A LITTLE

## 2024-11-25 ASSESSMENT — ACTIVITIES OF DAILY LIVING (ADL): ADL_ASSISTANCE: INDEPENDENT

## 2024-11-25 ASSESSMENT — PAIN - FUNCTIONAL ASSESSMENT
PAIN_FUNCTIONAL_ASSESSMENT: 0-10

## 2024-11-25 ASSESSMENT — PAIN SCALES - GENERAL
PAINLEVEL_OUTOF10: 0 - NO PAIN
PAINLEVEL_OUTOF10: 0 - NO PAIN
PAINLEVEL_OUTOF10: 6
PAINLEVEL_OUTOF10: 0 - NO PAIN

## 2024-11-25 NOTE — NURSING NOTE
Four eye skin check completed with myself and Christine Mg RN. Patient noted to have cancerous lesions on nose and bilateral arms. Patient is receiving topical ointment as treatment.

## 2024-11-25 NOTE — PROGRESS NOTES
Physical Therapy    Physical Therapy Treatment    Patient Name: Josias Ramirez  MRN: [id number]  Today's Date: 11/25/2024  Time Calculation  Start Time: 1000  Stop Time: 1025  Time Calculation (min): 25 min     3011/3011-A         11/25/24 1000   PT  Visit   PT Received On 11/25/24   Response to Previous Treatment Patient with no complaints from previous session.   General   Reason for Referral B LE weakness and multiple falls   Referred By Elina Jacob, APRN-CNP   Past Medical History Relevant to Rehab past medical history of Adenocarcinoma of left lung (Multi), Cancer (Multi), Cataracts, bilateral, Chronic anemia, Metastasis (Multi), Mixed hyperlipidemia, Nonmelanoma skin cancer, Parotid lymphadenopathy, Pedal edema, Peptic ulcer disease, Recurrent falls, Squamous cell carcinoma of nose, Tobacco use disorder, Type 2 diabetes mellitus, and Vitamin B12 deficiency.   Prior to Session Communication Bedside nurse   Patient Position Received Bed, 3 rail up;Alarm on   Preferred Learning Style verbal;visual   Precautions   Medical Precautions Fall precautions   Pain Assessment   Pain Assessment 0-10   0-10 (Numeric) Pain Score 0 - No pain   Cognition   Overall Cognitive Status WFL   Orientation Level Oriented X4   Coordination   Movements are Fluid and Coordinated Yes   Postural Control   Postural Control WFL   Therapeutic Exercise   Therapeutic Exercise Performed Yes   Therapeutic Exercise Activity 1 PF/DF x15   Therapeutic Exercise Activity 2 marches x15   Therapeutic Exercise Activity 3 LAQ x15   Therapeutic Exercise Activity 4 resisted hip ABD x15   Therapeutic Exercise Activity 5 pillow squeeze x15   Bed Mobility   Bed Mobility Yes   Bed Mobility 1   Bed Mobility 1 Supine to sitting   Level of Assistance 1 Close supervision   Ambulation/Gait Training   Ambulation/Gait Training Performed Yes   Ambulation/Gait Training 1   Surface 1 Level tile   Device 1 Rolling walker   Gait Support Devices Gait belt   Assistance 1  Contact guard   Quality of Gait 1 NBOS;Inconsistent stride length;Forward flexed posture;Soft knee(s)   Comments/Distance (ft) 1 50' x2 cues to widen VEGA and to avoid cross-stepping with turns   Transfers   Transfer Yes   Transfer 1   Transfer From 1 Bed to   Transfer to 1 Stand   Technique 1 Sit to stand   Transfer Device 1 Walker;Gait belt   Transfer Level of Assistance 1 Contact guard;Minimal verbal cues   Transfers 2   Transfer From 2 Stand to   Transfer to 2 Chair with arms   Technique 2 Stand to sit   Transfer Device 2 Walker;Gait belt   Transfer Level of Assistance 2 Contact guard;Minimal verbal cues   Trials/Comments 2 for hand placement to chair   Activity Tolerance   Endurance Endurance does not limit participation in activity   Early Mobility/Exercise Safety Screen Proceed with mobilization - No exclusion criteria met   PT Assessment   PT Assessment Results Decreased strength;Impaired balance;Decreased mobility   Rehab Prognosis Excellent   Evaluation/Treatment Tolerance Patient tolerated treatment well   End of Session Communication Bedside nurse   End of Session Patient Position Up in chair;Alarm on   Outpatient Education   Individual(s) Educated Patient   Education Provided Fall Risk;Body Mechanics;POC;Posture   Risk and Benefits Discussed with Patient/Caregiver/Other yes   Patient/Caregiver Demonstrated Understanding yes   Plan of Care Discussed and Agreed Upon yes   Patient Response to Education Patient/Caregiver Verbalized Understanding of Information;Patient/Caregiver Performed Return Demonstration of Exercises/Activities   PT Plan   Inpatient/Swing Bed or Outpatient Inpatient   PT Plan   Treatment/Interventions Bed mobility;Transfer training;Gait training;Therapeutic exercise   PT Plan Ongoing PT   PT Frequency 5 times per week   PT Discharge Recommendations High intensity level of continued care   Equipment Recommended upon Discharge Wheeled walker   PT Recommended Transfer Status Contact  guard;Assistive device       Outcome Measures:  [facility] Basic Mobility  Turning from your back to your side while in a flat bed without using bedrails: A little  Moving from lying on your back to sitting on the side of a flat bed without using bedrails: A little  Moving to and from bed to chair (including a wheelchair): A little  Standing up from a chair using your arms (e.g. wheelchair or bedside chair): A little  To walk in hospital room: A little  Climbing 3-5 steps with railing: A lot  Basic Mobility - Total Score: 17          EDUCATION:  Outpatient Education  Individual(s) Educated: Patient  Education Provided: Fall Risk, Body Mechanics, POC, Posture  Risk and Benefits Discussed with Patient/Caregiver/Other: yes  Patient/Caregiver Demonstrated Understanding: yes  Plan of Care Discussed and Agreed Upon: yes  Patient Response to Education: Patient/Caregiver Verbalized Understanding of Information, Patient/Caregiver Performed Return Demonstration of Exercises/Activities    GOALS:  Encounter Problems       Encounter Problems (Active)       Mobility       STG - Patient will ambulate level surface at least 150' using LRAD with supervision. (Progressing)       Start:  11/23/24    Expected End:  12/07/24            Pt will ascend/descend 1 stair without rail with LRAD to simulate entrance to home with supervision. (Progressing)       Start:  11/23/24    Expected End:  12/07/24               PT Transfers       STG - Transfer from bed to chair using LRAD with supervision (Progressing)       Start:  11/23/24    Expected End:  12/07/24            STG - Patient to transfer to and from sit to supine with bed flat, no rails, [place] (Progressing)       Start:  11/23/24    Expected End:  12/07/24            STG - Patient will transfer sit to and from stand using LRAD [place] (Progressing)       Start:  11/23/24    Expected End:  12/07/24               Pain - Adult

## 2024-11-25 NOTE — NURSING NOTE
Transfused  1 unit  prbc  today.  Pt. Changed  to  admit  in  patient  and report  was  given  to jase  and pt.  Transferred  to  room  3111.

## 2024-11-25 NOTE — CARE PLAN
The patient's goals for the shift include sleep, comfort    The clinical goals for the shift include see care plan      Problem: Pain - Adult  Goal: Verbalizes/displays adequate comfort level or baseline comfort level  Outcome: Progressing     Problem: Safety - Adult  Goal: Free from fall injury  Outcome: Progressing     Problem: Discharge Planning  Goal: Discharge to home or other facility with appropriate resources  Outcome: Progressing     Problem: Chronic Conditions and Co-morbidities  Goal: Patient's chronic conditions and co-morbidity symptoms are monitored and maintained or improved  Outcome: Progressing     Problem: Skin  Goal: Decreased wound size/increased tissue granulation at next dressing change  Outcome: Progressing  Flowsheets (Taken 11/25/2024 1728)  Decreased wound size/increased tissue granulation at next dressing change: Promote sleep for wound healing  Goal: Participates in plan/prevention/treatment measures  Outcome: Progressing  Flowsheets (Taken 11/25/2024 1728)  Participates in plan/prevention/treatment measures: Increase activity/out of bed for meals  Goal: Prevent/manage excess moisture  Outcome: Progressing  Flowsheets (Taken 11/23/2024 1152 by Rhina Goncalves RN)  Prevent/manage excess moisture:   Moisturize dry skin   Monitor for/manage infection if present   Cleanse incontinence/protect with barrier cream  Goal: Prevent/minimize sheer/friction injuries  Outcome: Progressing  Flowsheets (Taken 11/25/2024 1728)  Prevent/minimize sheer/friction injuries: Complete micro-shifts as needed if patient unable. Adjust patient position to relieve pressure points, not a full turn  Goal: Promote/optimize nutrition  Outcome: Progressing  Flowsheets (Taken 11/25/2024 1728)  Promote/optimize nutrition: Consume > 50% meals/supplements  Goal: Promote skin healing  Outcome: Progressing  Flowsheets (Taken 11/25/2024 0443 by Abigail Ward RN)  Promote skin healing:   Protective dressings over bony  prominences   Turn/reposition every 2 hours/use positioning/transfer devices     Problem: Diabetes  Goal: Achieve decreasing blood glucose levels by end of shift  Outcome: Progressing  Goal: Increase stability of blood glucose readings by end of shift  Outcome: Progressing  Flowsheets (Taken 11/25/2024 1728)  Increase stability of blood glucose readings by end of shift: Med administration/monitoring of effect  Goal: Decrease in ketones present in urine by end of shift  Outcome: Progressing  Goal: Maintain electrolyte levels within acceptable range throughout shift  Outcome: Progressing  Flowsheets (Taken 11/25/2024 1728)  Maintain electrolyte levels within acceptable range throughout shift: Monitor urine output  Goal: Maintain glucose levels >70mg/dl to <250mg/dl throughout shift  Outcome: Progressing  Flowsheets (Taken 11/25/2024 1728)  Maintain glucose levels >70mg/dl to <250mg/dl throughout shift: Med administration/monitoring of effect  Goal: No changes in neurological exam by end of shift  Outcome: Progressing  Goal: Learn about and adhere to nutrition recommendations by end of shift  Outcome: Progressing  Flowsheets (Taken 11/25/2024 1728)  Learn about and adhere to nutrition recommendations by end of shift: Ensure/encourage compliance with appropriate diet  Goal: Vital signs within normal range for age by end of shift  Outcome: Progressing  Flowsheets (Taken 11/25/2024 1728)  Vital signs within normal range for age by end of shift: Med administration/monitoring of effect  Goal: Increase self care and/or family involovement by end of shift  Outcome: Progressing  Goal: Receive DSME education by end of shift  Outcome: Progressing     Problem: Nutrition  Goal: Oral intake greater 75%  Outcome: Progressing  Goal: Consume prescribed supplement  Outcome: Progressing  Goal: Adequate PO fluid intake  Outcome: Progressing  Goal: BG  mg/dL  Outcome: Progressing  Goal: Lab values WNL  Outcome: Progressing  Goal:  Electrolytes WNL  Outcome: Progressing  Goal: Gradual weight gain  Outcome: Progressing     Problem: Dressings Lower Extremities  Goal: STG - Patient to complete lower body dressing MOD I  Outcome: Progressing     Problem: Grooming  Goal: STG - Patient completes grooming MOD I  Outcome: Progressing       EOS note: pt oriented x4 but forgetful. Incontinent of stool. Manning catheter is in place, CHG bath given. No reports of pain. Ambulates as a x1 with the walker, sat up in chair for meals. Protective mepilex changed on sacrum, band aid changed on nose, little drainage. Pt would like to go to [facility], awaiting to hear of acceptance. VSS this shift. Family at bedside today.     Pt resting at this time. Bed is in lowest position and locked with alarm on. Call light and personal possesions are within reach.

## 2024-11-25 NOTE — CONSULTS
"Reason For Consult    goals of care w daughters participation   History Of Present Illness  Josias Ramirez is a 74 y.o. male presenting with .     Past Medical History  He has a past medical history of Adenocarcinoma of left lung (Multi), Cancer (Multi), Cataracts, bilateral, Chronic anemia, Metastasis (Multi), Mixed hyperlipidemia, Nonmelanoma skin cancer, Parotid lymphadenopathy, Pedal edema, Peptic ulcer disease, Recurrent falls, Squamous cell carcinoma of nose, Tobacco use disorder, Type 2 diabetes mellitus, and Vitamin B12 deficiency.    Surgical History  He has a past surgical history that includes Sinus surgery; Pilonidal cyst drainage; Skin biopsy; and Other surgical history.     Social History  He reports that he has been smoking cigarettes. He has never used smokeless tobacco. He reports that he does not drink alcohol and does not use drugs.    Family History  Family History   Problem Relation Name Age of Onset    Other (Diabetes mellitus) Mother      Other (Diabetes mellitus) Sister      Other (Liver transplant) Brother          Allergies  Penicillins and Tramadol       Last Recorded Vitals  Blood pressure 122/58, pulse 71, temperature 36.3 °C (97.3 °F), temperature source Temporal, resp. rate 18, height 1.803 m (5' 11\"), weight 59.7 kg (131 lb 9.8 oz), SpO2 100%.         Assessment/Plan     Pt is from home. Pt's code status is Full Code. Pt's contact is sister Sheila Castillo [phone]. Pt with pmhx of PMHX  advanced cutaneous squamous cell carcinoma of the nose, right-sided parotid adenopathy, adenocarcinoma of left lung currently on chemoradiation therapy (follows with heme-onc/radiation[facility]onc [facility]), tobacco use disorder,non-insulin-dependent type 2 diabetes mellitus (A1c 9.2 4/2024), chronic anemia (baseline Hgb between 9-11), mixed hyperlipidemia, peptic ulcer disease, vitamin B12 deficiency and self reported chronic pedal edema  presenting to [facility] from home on 11/22/2024 with leg weakess and " multiple falls. He was recently hospitalized at [facility] on 1115 after falling on 1112.  He was recommended to go to rehab but decided he would prefer to go home and was discharged 11/16.  He fell again on 1119 but also decided he wanted to go home with his daughter.  He has not been able to manage and has fallen every day since he is left the hospital.   Pt is familiar to Palliative Care Team from previous admissions, referral has been placed with [facility] previously. Plan to readdress goals of care with pt/family.    12p  Met with pt and spoke with sister by phone. Pt lives at home with his sister who is his caretaker and his 100 yr old mother who they also care for. Pt has two daughters that live [place] in and are not really involved in his healthcare. Pt wishes to remain Full Code, and continue with CA treatments. He is still agreeable to outpatient Palliative Care with [facility], notified their team he is inpatient. Pt is now agreeable to rehab at SNF and would like [facility]. Pts sister requests pt have a Nicotine patch as he is a heavy smoker and she states the reason he is falling is because he is always trying to get outside to smoke. Plan for discharge to SNF with Palliative Care following, no additional needs at this time, we will sign off, please re-consult should situation change.       Nikky Vazquez LCSW

## 2024-11-25 NOTE — NURSING NOTE
Patient awake through most of night; incontinent of stool x2. All meds taken without difficulty. PRN Zanaflex given at 0400 for muscle spasms in legs. Call light in reach, safety maintained.

## 2024-11-25 NOTE — PROGRESS NOTES
Occupational Therapy    Occupational Therapy    Evaluation    Patient Name: Josias Ramirez  MRN: [id number]  Today's Date: 11/25/2024  Time Calculation  Start Time: 1005  Stop Time: 1018  Time Calculation (min): 13 min  3011/3011-A    Assessment  IP OT Assessment  OT Assessment:  (Pt. presents with decreased balance and endurance impacting pt. ability to complete ADLs and mobility independently)  Prognosis: Good  Evaluation/Treatment Tolerance: Patient tolerated treatment well  End of Session Communication: Bedside nurse  End of Session Patient Position: Up in chair, Alarm on    Plan:  Treatment Interventions: ADL retraining, Functional transfer training  OT Frequency: 5 times per week  OT Discharge Recommendations: High intensity level of continued care  Equipment Recommended upon Discharge: Wheeled walker  OT Recommended Transfer Status:  (CGA)  OT - OK to Discharge: Yes (Next level of care when cleared by medical team)    Subjective   Per EMR:   Josias Ramirez is a 74 y.o. 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      He was recently hospitalized at [facility] on 1115 after falling on 1112.  He was recommended to go to rehab but decided he would prefer to go home and was discharged 11/16.  He fell again on 1119 but also decided he wanted to go home with his daughter.  He has not been able to manage and has fallen every day since he is left the hospital.  At this point he is agreeable to come in for rehab placement.  He tells me his legs  "are heavy and he has hard time walking thus causing him to fall.  He denies dizziness and has not hit his head or been syncopal.  He denies shortness of breath or chest pain.  He was discharged with a Manning catheter and tells me \"it filled up and therefore I pulled it out\".  He has had difficulty urinating since then.      In the emergency room he is retaining urine therefore Manning catheter will be replaced.  Chest x-ray was clear on 1119 and lungs are relatively clear sounding but diminished.  BUN is 23, creatinine 1.09, potassium 4.1, sodium chronically low at 131.  Hemoglobin 8.7.       Past Medical History  He has a past medical history of Adenocarcinoma of left lung (Multi), Cancer (Multi), Cataracts, bilateral, Chronic anemia, Metastasis (Multi), Mixed hyperlipidemia, Nonmelanoma skin cancer, Parotid lymphadenopathy, Pedal edema, Peptic ulcer disease, Recurrent falls, Squamous cell carcinoma of nose, Tobacco use disorder, Type 2 diabetes mellitus, and Vitamin B12 deficiency.     Surgical History  He has a past surgical history that includes Sinus surgery; Pilonidal cyst drainage; Skin biopsy; and Other surgical history.  Current Problem:  1. Failure to thrive in adult  Catheter Care    CANCELED: Catheter Care      2. Hyperglycemia        3. Urinary retention            General:  General  Reason for Referral: ADLs, discharge planning  Referred By: Dr. Lubin  Family/Caregiver Present: No  Co-Treatment: PT  Prior to Session Communication: Bedside nurse  Patient Position Received: Bed, 3 rail up, Alarm on    Precautions:  Medical Precautions: Fall precautions        Pain:  Pain Assessment  Pain Assessment: 0-10  0-10 (Numeric) Pain Score: 6  Pain Type: Acute pain  Pain Location:  (Tailbone)    Objective     Cognition:  Overall Cognitive Status: Within Functional Limits  Orientation Level: Oriented X4             Home Living:  Home Living Comments:  (Pt. lives with mother (100yr old) and sister in [facility] with 1 " entry step with first floor set up with walk in shower with grab bars and shower chair. PLOF MOD I with ww)     Prior Function:  Level of [place]: Independent with ADLs and functional transfers  ADL Assistance: Independent  Homemaking Assistance: Independent  Ambulatory Assistance: Independent        ADL:  Grooming Assistance: Stand by  LE Dressing Assistance: Stand by  LE Dressing Deficit: Don/doff R shoe, Don/doff L shoe, Don/doff R sock, Don/doff L sock  Toileting Deficit: Urinary Catheter    Activity Tolerance:  Endurance: Endurance does not limit participation in activity    Bed Mobility/Transfers:   Bed Mobility  Bed Mobility:  (supine to sit SUP)  Transfers  Transfer:  (sit<>stand CGA with cues for safe hand placement)    Ambulation/Gait Training:  Functional Mobility  Functional Mobility Performed:  (Pt. completes functional mobility with ww and gait belt CGA)    Sitting Balance:  Static Sitting Balance  Static Sitting-Balance Support: Bilateral upper extremity supported  Static Sitting-Level of Assistance: Close supervision    Standing Balance:  Static Standing Balance  Static Standing-Balance Support: Bilateral upper extremity supported  Static Standing-Level of Assistance: Contact guard        Sensation:  Sensation Comment: Denies numbness      Hand Function:  Hand Function  Gross Grasp: Functional  Coordination: Functional    Extremities: RUE   RUE : Within Functional Limits  RUE AROM (degrees)  RUE AROM Comment: w and      Outcome Measures: [facility] Daily Activity  Putting on and taking off regular lower body clothing: A little  Bathing (including washing, rinsing, drying): A little  Putting on and taking off regular upper body clothing: A little  Toileting, which includes using toilet, bedpan or urinal: A little  Taking care of personal grooming such as brushing teeth: A little  Eating Meals: None  Daily Activity - Total Score: 19                       EDUCATION:  Education  Individual(s) Educated:  Patient  Education Provided: Fall precautons, Risk and benefits of OT discussed with patient or other, POC discussed and agreed upon  Education Documentation  No documentation found.  Education Comments  No comments found.        Goals:   Encounter Problems       Encounter Problems (Active)       Dressings Lower Extremities       STG - Patient to complete lower body dressing MOD I       Start:  11/25/24    Expected End:  12/09/24               Functional Balance       LTG - Patient will maintain standing and sitting balance to allow for completion of daily activities       Start:  11/25/24    Expected End:  12/09/24               Grooming       STG - Patient completes grooming MOD I       Start:  11/25/24    Expected End:  12/09/24               OT Transfers       STG - Patient will perform toilet transfer MOD I       Start:  11/25/24    Expected End:  12/09/24

## 2024-11-25 NOTE — CARE PLAN
The patient's goals for the shift include sleep, comfort    The clinical goals for the shift include   Problem: Pain - Adult  Goal: Verbalizes/displays adequate comfort level or baseline comfort level  Outcome: Progressing     Problem: Safety - Adult  Goal: Free from fall injury  Outcome: Progressing     Problem: Discharge Planning  Goal: Discharge to home or other facility with appropriate resources  Outcome: Progressing     Problem: Chronic Conditions and Co-morbidities  Goal: Patient's chronic conditions and co-morbidity symptoms are monitored and maintained or improved  Outcome: Progressing     Problem: Skin  Goal: Decreased wound size/increased tissue granulation at next dressing change  Outcome: Progressing  Goal: Participates in plan/prevention/treatment measures  Outcome: Progressing  Goal: Prevent/manage excess moisture  Outcome: Progressing  Goal: Prevent/minimize sheer/friction injuries  Outcome: Progressing  Flowsheets (Taken 11/25/2024 0443)  Prevent/minimize sheer/friction injuries:   Use pull sheet   HOB 30 degrees or less   Turn/reposition every 2 hours/use positioning/transfer devices  Goal: Promote/optimize nutrition  Outcome: Progressing  Goal: Promote skin healing  Outcome: Progressing  Flowsheets (Taken 11/25/2024 0443)  Promote skin healing:   Protective dressings over bony prominences   Turn/reposition every 2 hours/use positioning/transfer devices     Problem: Diabetes  Goal: Achieve decreasing blood glucose levels by end of shift  Outcome: Progressing  Goal: Increase stability of blood glucose readings by end of shift  Outcome: Progressing  Goal: Decrease in ketones present in urine by end of shift  Outcome: Progressing  Goal: Maintain electrolyte levels within acceptable range throughout shift  Outcome: Progressing  Goal: Maintain glucose levels >70mg/dl to <250mg/dl throughout shift  Outcome: Progressing  Goal: No changes in neurological exam by end of shift  Outcome: Progressing  Goal:  Learn about and adhere to nutrition recommendations by end of shift  Outcome: Progressing  Goal: Vital signs within normal range for age by end of shift  Outcome: Progressing  Goal: Increase self care and/or family involovement by end of shift  Outcome: Progressing  Goal: Receive DSME education by end of shift  Outcome: Progressing     Problem: Nutrition  Goal: Oral intake greater 75%  Outcome: Progressing  Goal: Consume prescribed supplement  Outcome: Progressing  Goal: Adequate PO fluid intake  Outcome: Progressing  Goal: BG  mg/dL  Outcome: Progressing  Goal: Lab values WNL  Outcome: Progressing  Goal: Electrolytes WNL  Outcome: Progressing  Goal: Gradual weight gain  Outcome: Progressing

## 2024-11-25 NOTE — PROGRESS NOTES
11/25/24 1048   Discharge Planning   Home or Post Acute Services Post acute facilities (Rehab/SNF/etc)   Type of Post Acute Facility Services Skilled nursing   Expected Discharge Disposition SNF   Does the patient need discharge transport arranged? Yes   RoundTrip coordination needed? Yes   Patient Choice   Provider Choice list and CMS website (https://medicare.gov/care-compare#search) for post-acute Quality and Resource Measure Data were provided and reviewed with: Patient     Reviewed chart and met with pt. Discussed recommendation for SNF placement. Pt is in agreement and is requesting [facility]. A SNF list was provided in the event [facility] can not accept. Request sent to [facility] to send SNF referral. Sw to follow.

## 2024-11-26 LAB
ANION GAP SERPL CALC-SCNC: 9 MMOL/L (ref 10–20)
BUN SERPL-MCNC: 11 MG/DL (ref 6–23)
CALCIUM SERPL-MCNC: 7.4 MG/DL (ref 8.6–10.3)
CHLORIDE SERPL-SCNC: 101 MMOL/L (ref 98–107)
CO2 SERPL-SCNC: 28 MMOL/L (ref 21–32)
CREAT SERPL-MCNC: 0.76 MG/DL (ref 0.5–1.3)
EGFRCR SERPLBLD CKD-EPI 2021: >90 ML/MIN/1.73M*2
ERYTHROCYTE [DISTWIDTH] IN BLOOD BY AUTOMATED COUNT: 17.2 % (ref 11.5–14.5)
GLUCOSE BLD MANUAL STRIP-MCNC: 134 MG/DL (ref 74–99)
GLUCOSE BLD MANUAL STRIP-MCNC: 167 MG/DL (ref 74–99)
GLUCOSE BLD MANUAL STRIP-MCNC: 171 MG/DL (ref 74–99)
GLUCOSE BLD MANUAL STRIP-MCNC: 200 MG/DL (ref 74–99)
GLUCOSE SERPL-MCNC: 137 MG/DL (ref 74–99)
HCT VFR BLD AUTO: 26.9 % (ref 41–52)
HCT VFR BLD AUTO: 27.8 % (ref 41–52)
HGB BLD-MCNC: 8.4 G/DL (ref 13.5–17.5)
HGB BLD-MCNC: 9 G/DL (ref 13.5–17.5)
HOLD SPECIMEN: NORMAL
LACTATE SERPL-SCNC: 1.1 MMOL/L (ref 0.4–2)
MCH RBC QN AUTO: 28.8 PG (ref 26–34)
MCHC RBC AUTO-ENTMCNC: 31.2 G/DL (ref 32–36)
MCV RBC AUTO: 92 FL (ref 80–100)
NRBC BLD-RTO: 0 /100 WBCS (ref 0–0)
PLATELET # BLD AUTO: 181 X10*3/UL (ref 150–450)
POTASSIUM SERPL-SCNC: 4 MMOL/L (ref 3.5–5.3)
RBC # BLD AUTO: 2.92 X10*6/UL (ref 4.5–5.9)
SODIUM SERPL-SCNC: 134 MMOL/L (ref 136–145)
WBC # BLD AUTO: 6 X10*3/UL (ref 4.4–11.3)

## 2024-11-26 PROCEDURE — 85014 HEMATOCRIT: CPT | Performed by: PHYSICIAN ASSISTANT

## 2024-11-26 PROCEDURE — 2500000002 HC RX 250 W HCPCS SELF ADMINISTERED DRUGS (ALT 637 FOR MEDICARE OP, ALT 636 FOR OP/ED): Performed by: NURSE PRACTITIONER

## 2024-11-26 PROCEDURE — 2500000004 HC RX 250 GENERAL PHARMACY W/ HCPCS (ALT 636 FOR OP/ED)

## 2024-11-26 PROCEDURE — 36415 COLL VENOUS BLD VENIPUNCTURE: CPT | Performed by: NURSE PRACTITIONER

## 2024-11-26 PROCEDURE — 97535 SELF CARE MNGMENT TRAINING: CPT | Mod: GP,CQ

## 2024-11-26 PROCEDURE — 2060000001 HC INTERMEDIATE ICU ROOM DAILY

## 2024-11-26 PROCEDURE — 2500000004 HC RX 250 GENERAL PHARMACY W/ HCPCS (ALT 636 FOR OP/ED): Performed by: ADVANCED PRACTICE MIDWIFE

## 2024-11-26 PROCEDURE — 83605 ASSAY OF LACTIC ACID: CPT | Performed by: PHYSICIAN ASSISTANT

## 2024-11-26 PROCEDURE — S4991 NICOTINE PATCH NONLEGEND: HCPCS | Performed by: NURSE PRACTITIONER

## 2024-11-26 PROCEDURE — 2500000002 HC RX 250 W HCPCS SELF ADMINISTERED DRUGS (ALT 637 FOR MEDICARE OP, ALT 636 FOR OP/ED): Performed by: UROLOGY

## 2024-11-26 PROCEDURE — 36415 COLL VENOUS BLD VENIPUNCTURE: CPT | Performed by: PHYSICIAN ASSISTANT

## 2024-11-26 PROCEDURE — 97110 THERAPEUTIC EXERCISES: CPT | Mod: GP,CQ

## 2024-11-26 PROCEDURE — 85027 COMPLETE CBC AUTOMATED: CPT | Performed by: NURSE PRACTITIONER

## 2024-11-26 PROCEDURE — 2500000005 HC RX 250 GENERAL PHARMACY W/O HCPCS: Performed by: NURSE PRACTITIONER

## 2024-11-26 PROCEDURE — 82374 ASSAY BLOOD CARBON DIOXIDE: CPT | Performed by: NURSE PRACTITIONER

## 2024-11-26 PROCEDURE — 97116 GAIT TRAINING THERAPY: CPT | Mod: GP,CQ

## 2024-11-26 PROCEDURE — 82947 ASSAY GLUCOSE BLOOD QUANT: CPT

## 2024-11-26 PROCEDURE — 2500000001 HC RX 250 WO HCPCS SELF ADMINISTERED DRUGS (ALT 637 FOR MEDICARE OP): Performed by: NURSE PRACTITIONER

## 2024-11-26 RX ORDER — ACETAMINOPHEN 325 MG/1
650 TABLET ORAL EVERY 4 HOURS PRN
Start: 2024-11-26

## 2024-11-26 ASSESSMENT — COGNITIVE AND FUNCTIONAL STATUS - GENERAL
TURNING FROM BACK TO SIDE WHILE IN FLAT BAD: A LITTLE
STANDING UP FROM CHAIR USING ARMS: A LITTLE
MOVING TO AND FROM BED TO CHAIR: A LITTLE
WALKING IN HOSPITAL ROOM: A LITTLE
DAILY ACTIVITIY SCORE: 24
CLIMB 3 TO 5 STEPS WITH RAILING: A LOT
MOBILITY SCORE: 17
MOBILITY SCORE: 24
MOVING FROM LYING ON BACK TO SITTING ON SIDE OF FLAT BED WITH BEDRAILS: A LITTLE

## 2024-11-26 ASSESSMENT — PAIN SCALES - GENERAL
PAINLEVEL_OUTOF10: 0 - NO PAIN

## 2024-11-26 ASSESSMENT — PAIN - FUNCTIONAL ASSESSMENT
PAIN_FUNCTIONAL_ASSESSMENT: 0-10

## 2024-11-26 NOTE — DOCUMENTATION CLARIFICATION NOTE
"    PATIENT:               JAMEY GOODWIN  ACCT #:                  [id number]  MRN:                       [id number]  :                       1950  ADMIT DATE:       2024 3:20 PM  DISCH DATE:  RESPONDING PROVIDER #:        [id number]          PROVIDER RESPONSE TEXT:    Acute blood loss anemia    CDI QUERY TEXT:    Clarification    Instruction:    Based on your assessment of the patient and the clinical information, please provide the requested documentation by clicking on the appropriate radio button and enter any additional information if prompted.    Question: Is there a diagnosis indicative of the lab values and clinical indicators    When answering this query, please exercise your independent professional judgment. The fact that a question is being asked, does not imply that any particular answer is desired or expected.    The patient's clinical indicators include:  Clinical Information:  74M presents for FTT, found to have hematuria and urinary retention    Clinical Indicators:  - Hgb, -: 8.7-8.9-6.5-9.2-8.5  - ED, , Leo: \"nursing attempted to obtain a Manning catheter, did have a lot of blood, at which point we attempted to irrigate, three-way Manning was attempted...urology consultation as an inpatient for acute retention in the setting of suspected traumatic hemorrhage and blood byproducts within the bladder itself.\"  - Uro, , Eloy: \"patient was noted to have urinary retention and a Manning catheter was placed...Hematuria secondary to catheter trauma.\"  - Med, , Tristian: \"Patient's hematuria is slowly improving continue with the bladder irrigation\"    Treatment:  1u PRBCs, serial CBC, CBI with three-way catheter placement by Uro    Risk Factors:  BPH with urinary retention/outflow obstruction  Options provided:  -- Acute blood loss anemia  -- Anemia due to chronic disease, Please specify chronic disease below  -- Other - I will add my own diagnosis  -- Refer to Clinical " Documentation Reviewer    Query created by: Eric Li on 11/25/2024 4:58 PM      Electronically signed by:  RUT HUTSON MD 11/26/2024 12:34 AM

## 2024-11-26 NOTE — CARE PLAN
Problem: Pain - Adult  Goal: Verbalizes/displays adequate comfort level or baseline comfort level  Outcome: Met     Problem: Safety - Adult  Goal: Free from fall injury  Outcome: Met     Problem: Discharge Planning  Goal: Discharge to home or other facility with appropriate resources  Outcome: Progressing     Problem: Chronic Conditions and Co-morbidities  Goal: Patient's chronic conditions and co-morbidity symptoms are monitored and maintained or improved  Outcome: Met     Problem: Skin  Goal: Decreased wound size/increased tissue granulation at next dressing change  Outcome: Progressing  Goal: Participates in plan/prevention/treatment measures  Outcome: Progressing  Goal: Prevent/manage excess moisture  Outcome: Progressing  Goal: Prevent/minimize sheer/friction injuries  Outcome: Progressing  Goal: Promote/optimize nutrition  Outcome: Progressing  Goal: Promote skin healing  Outcome: Progressing     Problem: Diabetes  Goal: Achieve decreasing blood glucose levels by end of shift  Outcome: Met  Goal: Increase stability of blood glucose readings by end of shift  Outcome: Met  Goal: Maintain glucose levels >70mg/dl to <250mg/dl throughout shift  Outcome: Met  Goal: No changes in neurological exam by end of shift  Outcome: Met  Goal: Learn about and adhere to nutrition recommendations by end of shift  Outcome: Met  Goal: Vital signs within normal range for age by end of shift  Outcome: Met     Problem: Nutrition  Goal: Adequate PO fluid intake  Outcome: Progressing     The patient's goals for the shift include sleep, comfort.    The clinical goals for the shift include Pt will remain HDS throughout shift. Goal met. Patient is stable at this time with no acute changes.

## 2024-11-26 NOTE — CARE PLAN
The clinical goals for the shift include Pt will remain HDS this shift.      Problem: Discharge Planning  Goal: Discharge to home or other facility with appropriate resources  Outcome: Progressing     Problem: Skin  Goal: Decreased wound size/increased tissue granulation at next dressing change  Outcome: Progressing  Flowsheets (Taken 11/26/2024 1756)  Decreased wound size/increased tissue granulation at next dressing change:   Promote sleep for wound healing   Protective dressings over bony prominences   Utilize specialty bed per algorithm  Goal: Participates in plan/prevention/treatment measures  Outcome: Progressing  Flowsheets (Taken 11/26/2024 1756)  Participates in plan/prevention/treatment measures:   Discuss with provider PT/OT consult   Increase activity/out of bed for meals   Elevate heels  Goal: Prevent/manage excess moisture  Outcome: Progressing  Flowsheets (Taken 11/26/2024 1756)  Prevent/manage excess moisture:   Cleanse incontinence/protect with barrier cream   Moisturize dry skin   Monitor for/manage infection if present   Follow provider orders for dressing changes  Goal: Prevent/minimize sheer/friction injuries  Outcome: Progressing  Flowsheets (Taken 11/26/2024 1756)  Prevent/minimize sheer/friction injuries:   Complete micro-shifts as needed if patient unable. Adjust patient position to relieve pressure points, not a full turn   Increase activity/out of bed for meals   Use pull sheet   HOB 30 degrees or less   Turn/reposition every 2 hours/use positioning/transfer devices   Utilize specialty bed per algorithm  Goal: Promote/optimize nutrition  Outcome: Progressing  Flowsheets (Taken 11/26/2024 1756)  Promote/optimize nutrition:   Monitor/record intake including meals   Offer water/supplements/favorite foods  Goal: Promote skin healing  Outcome: Progressing  Flowsheets (Taken 11/26/2024 1756)  Promote skin healing:   Assess skin/pad under line(s)/device(s)   Protective dressings over bony  prominences   Turn/reposition every 2 hours/use positioning/transfer devices   Ensure correct size (line/device) and apply per  instructions   Rotate device position/do not position patient on device     Problem: Diabetes  Goal: Decrease in ketones present in urine by end of shift  Outcome: Progressing  Goal: Maintain electrolyte levels within acceptable range throughout shift  Outcome: Progressing  Goal: Increase self care and/or family involovement by end of shift  Outcome: Progressing  Goal: Receive DSME education by end of shift  Outcome: Progressing     Problem: Nutrition  Goal: Oral intake greater 75%  Outcome: Progressing  Goal: Consume prescribed supplement  Outcome: Progressing  Goal: Adequate PO fluid intake  Outcome: Progressing  Goal: BG  mg/dL  Outcome: Progressing  Goal: Lab values WNL  Outcome: Progressing  Goal: Electrolytes WNL  Outcome: Progressing  Goal: Gradual weight gain  Outcome: Progressing     Problem: Dressings Lower Extremities  Goal: STG - Patient to complete lower body dressing MOD I  Outcome: Progressing     Problem: Grooming  Goal: STG - Patient completes grooming MOD I  Outcome: Progressing

## 2024-11-26 NOTE — PROGRESS NOTES
Spiritual Care Visit    Clinical Encounter Type  Visited With: Patient  Routine Visit: Introduction              Sacramental Encounters  Sacrament of Sick-Anointing: Anointed                             Taxonomy  Intended Effects: Establish rapport and connectedness, Preserve dignity and respect, Demonstrate caring and concern  Methods: Offer spiritual/Mormon support  Interventions: Active listening, Ask guided questions, Perform a Mormon rite or ritual, Share words of hope and inspiration

## 2024-11-26 NOTE — PROGRESS NOTES
Physical Therapy    Physical Therapy Treatment    Patient Name: Josias Ramirez  MRN: [id number]  Today's Date: 11/26/2024  Time Calculation  Start Time: 1210  Stop Time: 1300  Time Calculation (min): 50 min     2116/2116-A     11/26/24 1210   PT  Visit   PT Received On 11/26/24   Response to Previous Treatment Patient with no complaints from previous session.   General   Reason for Referral B LE weakness and multiple falls   Referred By Elina Jacob, APRN-CNP   Past Medical History Relevant to Rehab past medical history of Adenocarcinoma of left lung (Multi), Cancer (Multi), Cataracts, bilateral, Chronic anemia, Metastasis (Multi), Mixed hyperlipidemia, Nonmelanoma skin cancer, Parotid lymphadenopathy, Pedal edema, Peptic ulcer disease, Recurrent falls, Squamous cell carcinoma of nose, Tobacco use disorder, Type 2 diabetes mellitus, and Vitamin B12 deficiency.   Prior to Session Communication Bedside nurse   Patient Position Received Bed, 3 rail up;Alarm on   Preferred Learning Style verbal;visual   General Comment Pt. was in bed when I arrived he was agreeable to PT.  He stated he wanted to get OOB and walk. Pt. trasnfer to [facility] last evening due to low BP's Pt cleared for PT at this time. Nurse requested orthostatic BP, IV. Tele lines, Manning Cath.   Precautions   Medical Precautions Fall precautions   Precautions Comment Bed/Chair Check   Vital Signs   Heart Rate 80   Vital Signs Comment Orthostatic BP Left Arm  Supine     HR 80,    /61  Sitting      HR 87,    /56  Standing  HR 88,    /56   Pain Assessment   0-10 (Numeric) Pain Score 0 - No pain   Cognition   Orientation Level Oriented X4   Therapeutic Exercise   Therapeutic Exercise Performed Yes   Therapeutic Exercise Activity 1 Supine Ex's AP, QS, Heel slides, Hip abd/add  x10   Therapeutic Activity   Therapeutic Activity Performed Yes   Bed Mobility   Bed Mobility Yes   Bed Mobility 1   Bed Mobility 1 Supine to sitting   Level of Assistance 1  Contact guard;Minimum assistance   Bed Mobility Comments 1 CGA/min assist x1. Assist to prevent tangling of lines and tubes. PT was steady in sitting.   Ambulation/Gait Training   Ambulation/Gait Training Performed Yes   Ambulation/Gait Training 1   Surface 1 Level tile   Device 1 Rolling walker   Gait Support Devices Gait belt   Assistance 1 Contact guard   Quality of Gait 1 NBOS;Inconsistent stride length;Forward flexed posture   Comments/Distance (ft) 1 Pt. ambulated 40'x4 with the wheeled walker. No LOB but was unsteady with turning aournd. As pt.fatigued, his steps becamne more inconsistent and had difficulty maintaining a straight path.   Transfers   Transfer Yes   Transfer 1   Technique 1 Sit to stand;Stand to sit   Transfer Device 1 Walker;Gait belt   Transfer Level of Assistance 1 Contact guard   Trials/Comments 1 Verbal cues for pacing and to prevent tangling of lines. Pt. was bowel incontinent upon standing. Standing BP taken.   Transfers 2   Transfer From 2 Bed to   Transfer to 2 Commode-standard   Transfer Device 2 Walker;Gait belt   Transfer Level of Assistance 2 Contact guard;Minimal verbal cues   Trials/Comments 2 Pt. stood from the [facility] CGA. Cues for hand placement. Nurse in to assist with pt. care.   Activity Tolerance   Endurance Tolerates 30 min exercise with multiple rests   PT Assessment   PT Assessment Results Decreased strength;Impaired balance;Decreased mobility   Rehab Prognosis Excellent   End of Session Communication Bedside nurse   End of Session Patient Position Up in chair;Alarm on   PT Plan   Inpatient/Swing Bed or Outpatient Inpatient   PT Plan   PT Plan Ongoing PT   PT Frequency 5 times per week   PT Discharge Recommendations High intensity level of continued care   Equipment Recommended upon Discharge Wheeled walker         Assessment/Plan   PT Assessment  PT Assessment Results: Decreased strength, Impaired balance, Decreased mobility  Rehab Prognosis: Excellent  End of Session  Communication: Bedside nurse  End of Session Patient Position: Up in chair, Alarm on  PT Plan  Inpatient/Swing Bed or Outpatient: Inpatient  PT Plan  Treatment/Interventions: Bed mobility, Transfer training, Gait training, Therapeutic exercise  PT Plan: Ongoing PT  PT Frequency: 5 times per week  PT Discharge Recommendations: High intensity level of continued care  Equipment Recommended upon Discharge: Wheeled walker  PT Recommended Transfer Status: Contact guard, Assistive device  PT - OK to Discharge:  (OK to discharge from acute to next recommended level of care once cleared by medical team.)    Outcome Measures:  [facility] Basic Mobility  Turning from your back to your side while in a flat bed without using bedrails: A little  Moving from lying on your back to sitting on the side of a flat bed without using bedrails: A little  Moving to and from bed to chair (including a wheelchair): A little  Standing up from a chair using your arms (e.g. wheelchair or bedside chair): A little  To walk in hospital room: A little  Climbing 3-5 steps with railing: A lot  Basic Mobility - Total Score: 17                             EDUCATION:  Outpatient Education  Individual(s) Educated: Patient  Education Provided: Fall Risk, Body Mechanics, POC, Posture  Risk and Benefits Discussed with Patient/Caregiver/Other: yes  Patient/Caregiver Demonstrated Understanding: yes  Plan of Care Discussed and Agreed Upon: yes  Patient Response to Education: Patient/Caregiver Verbalized Understanding of Information, Patient/Caregiver Performed Return Demonstration of Exercises/Activities  Education Documentation  Precautions, taught by Peter Ball PTA at 11/26/2024  3:10 PM.  Learner: Patient  Readiness: Acceptance  Method: Explanation, Demonstration  Response: Needs Reinforcement, Verbalizes Understanding    Body Mechanics, taught by Peter Ball PTA at 11/26/2024  3:10 PM.  Learner: Patient  Readiness: Acceptance  Method: Explanation,  Demonstration  Response: Needs Reinforcement, Verbalizes Understanding    Home Exercise Program, taught by Peter Ball PTA at 11/26/2024  3:10 PM.  Learner: Patient  Readiness: Acceptance  Method: Explanation, Demonstration  Response: Needs Reinforcement, Verbalizes Understanding    Mobility Training, taught by Peter Ball PTA at 11/26/2024  3:10 PM.  Learner: Patient  Readiness: Acceptance  Method: Explanation, Demonstration  Response: Needs Reinforcement, Verbalizes Understanding    Education Comments  No comments found.        GOALS:  Encounter Problems       Encounter Problems (Active)       Mobility       STG - Patient will ambulate level surface at least 150' using LRAD with supervision. (Progressing)       Start:  11/23/24    Expected End:  12/07/24            Pt will ascend/descend 1 stair without rail with LRAD to simulate entrance to home with supervision. (Progressing)       Start:  11/23/24    Expected End:  12/07/24               PT Transfers       STG - Transfer from bed to chair using LRAD with supervision (Progressing)       Start:  11/23/24    Expected End:  12/07/24            STG - Patient to transfer to and from sit to supine with bed flat, no rails, [place] (Progressing)       Start:  11/23/24    Expected End:  12/07/24            STG - Patient will transfer sit to and from stand using LRAD [place] (Progressing)       Start:  11/23/24    Expected End:  12/07/24               Pain - Adult

## 2024-11-26 NOTE — PROGRESS NOTES
Met with pt and his niece. Notified him that [facility] can accept, bu that he would need to put his cancer treatments on hold while he their. Pt stated he was done with his cancer treatments, so that should not be an issues. Facility notifed that they are [facility]. Request to start pre-cert was send.     KARISSA Ryder

## 2024-11-26 NOTE — NURSING NOTE
Patient awake at Aox4 at beginning of shift. Day shift nurse gave zanaflex at 1906. Vitals taken at 2000. BP 88/30 manual. Pt lethargic at that time and Greda MARTINEZ jennifer galvan was notified. Gerda at bedside by 2030. Pt's blood pressure was 62/40 and hard to wake. Patient placed in trendelenburg and bolus 1000ml LR. Pt's blood pressure increased to 78/53 at 2110. Stat labs were drawn. Awaiting results. Patient became more alert by end of fluid bolus. Patient taken to CCU d/t low blood pressure at 2125.

## 2024-11-26 NOTE — PROGRESS NOTES
Josias Ramirez is a 74 y.o. male on day 2 of admission presenting with Gait disorder.    Subjective   Lethargic       Objective         Current Facility-Administered Medications:     acetaminophen (Tylenol) tablet 650 mg, 650 mg, oral, q4h PRN, 650 mg at 11/24/24 0420 **OR** acetaminophen (Tylenol) oral liquid 650 mg, 650 mg, oral, q4h PRN **OR** acetaminophen (Tylenol) suppository 650 mg, 650 mg, rectal, q4h PRN, Elina Jacob APRN-CNP    dextrose 50 % injection 12.5 g, 12.5 g, intravenous, q15 min PRN, Elina Jacob APRN-CNP    dextrose 50 % injection 25 g, 25 g, intravenous, q15 min PRN, Elina Jacob APRN-CNP    flu vaccine, trivalent, preservative free, HIGH-DOSE, age 65y+ (Fluzone), 0.5 mL, intramuscular, During hospitalization, RAMILA Gordon MD    [Held by provider] gabapentin (Neurontin) capsule 800 mg, 800 mg, oral, TID, Elina Jacob APRN-CNP, 800 mg at 11/25/24 1551    glucagon (Glucagen) injection 1 mg, 1 mg, intramuscular, q15 min PRN, Elina Jacob APRN-CNP    glucagon (Glucagen) injection 1 mg, 1 mg, intramuscular, q15 min PRN, Elina Jacob APRN-CNP    insulin lispro injection 0-5 Units, 0-5 Units, subcutaneous, TID AC, Elina Jacob APRN-CNP, 3 Units at 11/25/24 1754    lidocaine 4 % patch 2 patch, 2 patch, transdermal, Daily, Elina Jacob APRN-CNP, 2 patch at 11/26/24 0856    [Held by provider] lisinopril tablet 2.5 mg, 2.5 mg, oral, Daily, Elina Jacob APRN-CNP, 2.5 mg at 11/25/24 0915    melatonin tablet 3 mg, 3 mg, oral, Nightly PRN, Elina Jacob APRN-CNP    neomycin-bacitracnZn-polymyxnB (Neosporin Original) ointment, , Topical, q8h, Lakshmi Ventura, APRN-CNP, 1 Application at 11/26/24 0857    nicotine (Nicoderm CQ) 14 mg/24 hr patch 1 patch, 1 patch, transdermal, Daily, ARTEMIO Davis, 1 patch at 11/26/24 0856    nystatin (Mycostatin) 100,000 unit/mL suspension 500,000 Units, 5 mL, Swish & Swallow, 4x daily, ARTEMIO Davis, 500,000 Units at  "11/26/24 0606    oral hydration solution 400 mL, 400 mL, oral, q4h LASHELL, Val FlemingVICTOR M-CNP, 400 mL at 11/26/24 0929    SITagliptin phosphate (Januvia) tablet 100 mg, 100 mg, oral, Daily, ARTEMIO Davis, 100 mg at 11/26/24 0857    sodium chloride 0.9 % irrigation solution 3,000 mL, 3,000 mL, irrigation, Continuous, Melvin Lyons MD, 3,000 mL at 11/22/24 2320    tamsulosin (Flomax) 24 hr capsule 0.4 mg, 0.4 mg, oral, q PM, Melvin Lyons MD, 0.4 mg at 11/25/24 2227    [Held by provider] tiZANidine (Zanaflex) tablet 4 mg, 4 mg, oral, BID PRN, ARTEMIO Davis, 4 mg at 11/25/24 1906       Physical Exam  HENT:      Head: Normocephalic.   Eyes:      Conjunctiva/sclera: Conjunctivae normal.   Cardiovascular:      Rate and Rhythm: Regular rhythm.   Pulmonary:      Breath sounds: Normal breath sounds.   Abdominal:      General: Bowel sounds are normal.      Palpations: Abdomen is soft.   Musculoskeletal:         General: Normal range of motion.   Skin:     General: Skin is warm and dry.   Neurological:      General: No focal deficit present.      Mental Status: He is alert.   Psychiatric:         Behavior: Behavior normal.           Last Recorded Vitals  Blood pressure 117/58, pulse 84, temperature 36.3 °C (97.3 °F), temperature source Temporal, resp. rate 22, height 1.803 m (5' 11\"), weight 59.7 kg (131 lb 9.8 oz), SpO2 99%.  Intake/Output last 3 Shifts:  I/O last 3 completed shifts:  In: 7683.6 (128.7 mL/kg) [P.O.:720; Blood:3947.9; IV Piggyback:3015.7]  Out: 2050 (34.3 mL/kg) [Urine:2050 (1 mL/kg/hr)]  Weight: 59.7 kg     Labs:       Results for orders placed or performed during the hospital encounter of 11/22/24 (from the past 24 hours)   POCT GLUCOSE   Result Value Ref Range    POCT Glucose 287 (H) 74 - 99 mg/dL   POCT GLUCOSE   Result Value Ref Range    POCT Glucose 291 (H) 74 - 99 mg/dL   POCT GLUCOSE   Result Value Ref Range    POCT Glucose 226 (H) 74 - 99 mg/dL   Comprehensive Metabolic Panel "   Result Value Ref Range    Glucose 222 (H) 74 - 99 mg/dL    Sodium 132 (L) 136 - 145 mmol/L    Potassium 4.2 3.5 - 5.3 mmol/L    Chloride 98 98 - 107 mmol/L    Bicarbonate 30 21 - 32 mmol/L    Anion Gap 8 (L) 10 - 20 mmol/L    Urea Nitrogen 13 6 - 23 mg/dL    Creatinine 0.96 0.50 - 1.30 mg/dL    eGFR 83 >60 mL/min/1.73m*2    Calcium 7.4 (L) 8.6 - 10.3 mg/dL    Albumin 2.2 (L) 3.4 - 5.0 g/dL    Alkaline Phosphatase 60 33 - 136 U/L    Total Protein 3.9 (L) 6.4 - 8.2 g/dL    AST 10 9 - 39 U/L    Bilirubin, Total 0.3 0.0 - 1.2 mg/dL    ALT 11 10 - 52 U/L   Protime-INR   Result Value Ref Range    Protime 12.5 9.8 - 12.8 seconds    INR 1.1 0.9 - 1.1   Lactate   Result Value Ref Range    Lactate 2.6 (H) 0.4 - 2.0 mmol/L   CBC   Result Value Ref Range    WBC 6.1 4.4 - 11.3 x10*3/uL    nRBC 0.0 0.0 - 0.0 /100 WBCs    RBC 2.55 (L) 4.50 - 5.90 x10*6/uL    Hemoglobin 7.5 (L) 13.5 - 17.5 g/dL    Hematocrit 22.8 (L) 41.0 - 52.0 %    MCV 89 80 - 100 fL    MCH 29.4 26.0 - 34.0 pg    MCHC 32.9 32.0 - 36.0 g/dL    RDW 16.9 (H) 11.5 - 14.5 %    Platelets 164 150 - 450 x10*3/uL   Lactate   Result Value Ref Range    Lactate 2.0 0.4 - 2.0 mmol/L   Blood Culture    Specimen: Peripheral Venipuncture; Blood culture   Result Value Ref Range    Blood Culture Loaded on Instrument - Culture in progress    Blood Culture    Specimen: Peripheral Venipuncture; Blood culture   Result Value Ref Range    Blood Culture Loaded on Instrument - Culture in progress    Urinalysis with Reflex Culture and Microscopic   Result Value Ref Range    Color, Urine Yellow Light-Yellow, Yellow, Dark-Yellow    Appearance, Urine Clear Clear    Specific Gravity, Urine 1.019 1.005 - 1.035    pH, Urine 5.5 5.0, 5.5, 6.0, 6.5, 7.0, 7.5, 8.0    Protein, Urine 30 (1+) (A) NEGATIVE, 10 (TRACE), 20 (TRACE) mg/dL    Glucose, Urine 500 (3+) (A) Normal mg/dL    Blood, Urine OVER (3+) (A) NEGATIVE    Ketones, Urine TRACE (A) NEGATIVE mg/dL    Bilirubin, Urine NEGATIVE NEGATIVE     Urobilinogen, Urine Normal Normal mg/dL    Nitrite, Urine NEGATIVE NEGATIVE    Leukocyte Esterase, Urine NEGATIVE NEGATIVE   Extra Urine Gray Tube   Result Value Ref Range    Extra Tube Hold for add-ons.    Urinalysis Microscopic   Result Value Ref Range    WBC, Urine 1-5 1-5, NONE /HPF    RBC, Urine 3-5 NONE, 1-2, 3-5 /HPF    Mucus, Urine FEW Reference range not established. /LPF    Hyaline Casts, Urine OCCASIONAL (A) NONE /LPF   Lactate   Result Value Ref Range    Lactate 1.1 0.4 - 2.0 mmol/L   Hemoglobin and Hematocrit, Blood   Result Value Ref Range    Hemoglobin 9.0 (L) 13.5 - 17.5 g/dL    Hematocrit 27.8 (L) 41.0 - 52.0 %   Basic metabolic panel   Result Value Ref Range    Glucose 137 (H) 74 - 99 mg/dL    Sodium 134 (L) 136 - 145 mmol/L    Potassium 4.0 3.5 - 5.3 mmol/L    Chloride 101 98 - 107 mmol/L    Bicarbonate 28 21 - 32 mmol/L    Anion Gap 9 (L) 10 - 20 mmol/L    Urea Nitrogen 11 6 - 23 mg/dL    Creatinine 0.76 0.50 - 1.30 mg/dL    eGFR >90 >60 mL/min/1.73m*2    Calcium 7.4 (L) 8.6 - 10.3 mg/dL   CBC   Result Value Ref Range    WBC 6.0 4.4 - 11.3 x10*3/uL    nRBC 0.0 0.0 - 0.0 /100 WBCs    RBC 2.92 (L) 4.50 - 5.90 x10*6/uL    Hemoglobin 8.4 (L) 13.5 - 17.5 g/dL    Hematocrit 26.9 (L) 41.0 - 52.0 %    MCV 92 80 - 100 fL    MCH 28.8 26.0 - 34.0 pg    MCHC 31.2 (L) 32.0 - 36.0 g/dL    RDW 17.2 (H) 11.5 - 14.5 %    Platelets 181 150 - 450 x10*3/uL   POCT GLUCOSE   Result Value Ref Range    POCT Glucose 134 (H) 74 - 99 mg/dL              Assessment/Plan   Principal Problem:    Gait disorder  Active Problems:    Severe protein-calorie malnutrition (Multi)    Adenocarcinoma of left lung (Multi)    Type 2 diabetes mellitus    Squamous cell carcinoma of nose    Chronic anemia    Gait abnormality    Failure to thrive in adult  Hypotension  AMS due to encephalopathy      PLAN: Pt was noted to be hypotensive hence was transferred to ICU, IVF bolus was given, monitor BP, slightly lethargic,, ? Adverse effect of  Zanaflex, monitor closely, d/w CNP, for now c/w supportive care.            Silvestre Lubin MD

## 2024-11-26 NOTE — SIGNIFICANT EVENT
@1951 hrs REBECCA Mcdonald messaged that patient's manual BP is 88/30 HR 75 , patient is tired and will barely stay awake to answer questions, he did get flexeril around 7pm per Tamara; she was advised to repeat vitals in 20 minutes and let me know what they are    2025hrs Per Tamara, patient is very lethargic, hard to arouse, BP 62/43 HR 70s; stat 500 ml LR bolus ordered over 2 hours, & I went to assess the patient     2030 hrs REBECCA Mcdonald and REBECCA Hayes who had the patient last night both in room; REBECCA Mcdonald informed me that he did not get Flexeril rather he got Zanaflex at 7pm, per Abigail patient was not like this yesterday, he was awake and asked for something to help him sleep around 3-4 am & was answering questions appropriately & got zanaflex at approx 5 am, at 8 am he had a low BP reading with systolic in the 80s              I gently shook the patient and said his name, he did open his eyes & speak to me; he appeared groggy and sleepy; He was in the reverse trendelenburg position; Systolic dropped to 50's so fluids changed to 1 Liter LR over 1 hour , Patient on room air, denying any complaints, stat labs (CMP, Pt/inr, lactate CBC), the patient was able to tell us all that he is in the hospital & that the year is 2024, transfer order for stepdown placed ; Dr Lubin updated ; prior to transfer to 2nd floor, vitals improved to 85/57 HR 60s; Zanaflex placed on hold ; urine is clear yellow in color, no obvious active bleeding     2119 Patient arrived to 2116, I did check on the patient again, he was asking if he could sleep now & was much more alert & awake; lactate 2.6, Hemoglobin 7.5, Sodium 132, albumin 2.2, glucose 222, stat UA, blood cxs, CXR    2156 REBECCA Pandya messages bp 76/47 ,map 60; Manual BP 83/51 ;map 61 hr 63; Val Flemnig NP went to assess the patient who was asymptomatic, awake & alert, asking to eat; I ordered another 1 liter bolus NS, repeat lactate & hemoglobin at 12:30am, then repeat H&H at 4am      2207  hrs lactate 2.0; held lisinopril & zanflex & gabapentin due to lethargy & hypotension     2212 BP 94/51    Test results: UA neg for nitrites or leukocytes- glucosuria, ketones, + blood and protein. Urine clear in color, CXR neg for acute process      Assessment ->**Possibly adverse rxn to Zanaflex?!    >60 minutes spent in the care of this patient

## 2024-11-27 ENCOUNTER — HOME CARE VISIT (OUTPATIENT)
Dept: HOME HEALTH SERVICES | Facility: HOME HEALTH | Age: 74
End: 2024-11-27
Payer: COMMERCIAL

## 2024-11-27 LAB
ANION GAP SERPL CALC-SCNC: 10 MMOL/L (ref 10–20)
BUN SERPL-MCNC: 8 MG/DL (ref 6–23)
CALCIUM SERPL-MCNC: 7.5 MG/DL (ref 8.6–10.3)
CHLORIDE SERPL-SCNC: 101 MMOL/L (ref 98–107)
CO2 SERPL-SCNC: 29 MMOL/L (ref 21–32)
CREAT SERPL-MCNC: 0.72 MG/DL (ref 0.5–1.3)
EGFRCR SERPLBLD CKD-EPI 2021: >90 ML/MIN/1.73M*2
ERYTHROCYTE [DISTWIDTH] IN BLOOD BY AUTOMATED COUNT: 17 % (ref 11.5–14.5)
GLUCOSE BLD MANUAL STRIP-MCNC: 108 MG/DL (ref 74–99)
GLUCOSE BLD MANUAL STRIP-MCNC: 127 MG/DL (ref 74–99)
GLUCOSE BLD MANUAL STRIP-MCNC: 141 MG/DL (ref 74–99)
GLUCOSE BLD MANUAL STRIP-MCNC: 207 MG/DL (ref 74–99)
GLUCOSE SERPL-MCNC: 100 MG/DL (ref 74–99)
HCT VFR BLD AUTO: 24.7 % (ref 41–52)
HGB BLD-MCNC: 8 G/DL (ref 13.5–17.5)
MCH RBC QN AUTO: 28.9 PG (ref 26–34)
MCHC RBC AUTO-ENTMCNC: 32.4 G/DL (ref 32–36)
MCV RBC AUTO: 89 FL (ref 80–100)
NRBC BLD-RTO: 0 /100 WBCS (ref 0–0)
PLATELET # BLD AUTO: 193 X10*3/UL (ref 150–450)
POTASSIUM SERPL-SCNC: 3.5 MMOL/L (ref 3.5–5.3)
RBC # BLD AUTO: 2.77 X10*6/UL (ref 4.5–5.9)
SODIUM SERPL-SCNC: 136 MMOL/L (ref 136–145)
WBC # BLD AUTO: 5.7 X10*3/UL (ref 4.4–11.3)

## 2024-11-27 PROCEDURE — 2500000005 HC RX 250 GENERAL PHARMACY W/O HCPCS: Performed by: NURSE PRACTITIONER

## 2024-11-27 PROCEDURE — 85027 COMPLETE CBC AUTOMATED: CPT | Performed by: NURSE PRACTITIONER

## 2024-11-27 PROCEDURE — 2500000004 HC RX 250 GENERAL PHARMACY W/ HCPCS (ALT 636 FOR OP/ED)

## 2024-11-27 PROCEDURE — 51702 INSERT TEMP BLADDER CATH: CPT

## 2024-11-27 PROCEDURE — 1100000001 HC PRIVATE ROOM DAILY

## 2024-11-27 PROCEDURE — 2500000002 HC RX 250 W HCPCS SELF ADMINISTERED DRUGS (ALT 637 FOR MEDICARE OP, ALT 636 FOR OP/ED): Performed by: UROLOGY

## 2024-11-27 PROCEDURE — 36415 COLL VENOUS BLD VENIPUNCTURE: CPT | Performed by: NURSE PRACTITIONER

## 2024-11-27 PROCEDURE — 97535 SELF CARE MNGMENT TRAINING: CPT | Mod: GO,CO

## 2024-11-27 PROCEDURE — 2500000002 HC RX 250 W HCPCS SELF ADMINISTERED DRUGS (ALT 637 FOR MEDICARE OP, ALT 636 FOR OP/ED): Performed by: NURSE PRACTITIONER

## 2024-11-27 PROCEDURE — S4991 NICOTINE PATCH NONLEGEND: HCPCS | Performed by: NURSE PRACTITIONER

## 2024-11-27 PROCEDURE — 2500000001 HC RX 250 WO HCPCS SELF ADMINISTERED DRUGS (ALT 637 FOR MEDICARE OP): Performed by: NURSE PRACTITIONER

## 2024-11-27 PROCEDURE — 82947 ASSAY GLUCOSE BLOOD QUANT: CPT

## 2024-11-27 PROCEDURE — 80048 BASIC METABOLIC PNL TOTAL CA: CPT | Performed by: NURSE PRACTITIONER

## 2024-11-27 RX ORDER — POTASSIUM CHLORIDE 20 MEQ/1
20 TABLET, EXTENDED RELEASE ORAL ONCE
Status: COMPLETED | OUTPATIENT
Start: 2024-11-27 | End: 2024-11-27

## 2024-11-27 RX ORDER — ONDANSETRON HYDROCHLORIDE 2 MG/ML
4 INJECTION, SOLUTION INTRAVENOUS EVERY 8 HOURS PRN
Status: DISCONTINUED | OUTPATIENT
Start: 2024-11-27 | End: 2024-11-29 | Stop reason: HOSPADM

## 2024-11-27 RX ORDER — GABAPENTIN 100 MG/1
200 CAPSULE ORAL 3 TIMES DAILY
Status: DISCONTINUED | OUTPATIENT
Start: 2024-11-27 | End: 2024-11-29 | Stop reason: HOSPADM

## 2024-11-27 ASSESSMENT — COGNITIVE AND FUNCTIONAL STATUS - GENERAL
DRESSING REGULAR LOWER BODY CLOTHING: A LITTLE
STANDING UP FROM CHAIR USING ARMS: A LITTLE
TOILETING: A LITTLE
WALKING IN HOSPITAL ROOM: A LITTLE
HELP NEEDED FOR BATHING: A LOT
DRESSING REGULAR UPPER BODY CLOTHING: A LITTLE
MOVING TO AND FROM BED TO CHAIR: A LITTLE
PERSONAL GROOMING: A LITTLE
MOVING FROM LYING ON BACK TO SITTING ON SIDE OF FLAT BED WITH BEDRAILS: A LITTLE
DAILY ACTIVITIY SCORE: 18
DRESSING REGULAR UPPER BODY CLOTHING: A LITTLE
TOILETING: A LITTLE
EATING MEALS: A LITTLE
MOBILITY SCORE: 18
DAILY ACTIVITIY SCORE: 18
STANDING UP FROM CHAIR USING ARMS: A LITTLE
DRESSING REGULAR LOWER BODY CLOTHING: A LITTLE
PERSONAL GROOMING: A LITTLE
CLIMB 3 TO 5 STEPS WITH RAILING: A LITTLE
TURNING FROM BACK TO SIDE WHILE IN FLAT BAD: A LITTLE
TURNING FROM BACK TO SIDE WHILE IN FLAT BAD: A LITTLE
MOVING TO AND FROM BED TO CHAIR: A LITTLE
MOBILITY SCORE: 18
HELP NEEDED FOR BATHING: A LITTLE
CLIMB 3 TO 5 STEPS WITH RAILING: A LITTLE
TOILETING: A LOT
WALKING IN HOSPITAL ROOM: A LITTLE
HELP NEEDED FOR BATHING: A LITTLE
MOVING FROM LYING ON BACK TO SITTING ON SIDE OF FLAT BED WITH BEDRAILS: A LITTLE
EATING MEALS: A LITTLE
DAILY ACTIVITIY SCORE: 16
PERSONAL GROOMING: A LITTLE
DRESSING REGULAR UPPER BODY CLOTHING: A LITTLE
DRESSING REGULAR LOWER BODY CLOTHING: A LOT

## 2024-11-27 ASSESSMENT — PAIN SCALES - GENERAL
PAINLEVEL_OUTOF10: 7
PAINLEVEL_OUTOF10: 0 - NO PAIN
PAINLEVEL_OUTOF10: 0 - NO PAIN
PAINLEVEL_OUTOF10: 5 - MODERATE PAIN
PAINLEVEL_OUTOF10: 0 - NO PAIN

## 2024-11-27 ASSESSMENT — PAIN DESCRIPTION - LOCATION: LOCATION: COCCYX

## 2024-11-27 ASSESSMENT — ACTIVITIES OF DAILY LIVING (ADL): HOME_MANAGEMENT_TIME_ENTRY: 38

## 2024-11-27 ASSESSMENT — PAIN - FUNCTIONAL ASSESSMENT
PAIN_FUNCTIONAL_ASSESSMENT: 0-10

## 2024-11-27 ASSESSMENT — PAIN DESCRIPTION - ORIENTATION: ORIENTATION: MID

## 2024-11-27 ASSESSMENT — PAIN DESCRIPTION - DESCRIPTORS: DESCRIPTORS: CRAMPING

## 2024-11-27 NOTE — PROGRESS NOTES
Occupational Therapy    OT Treatment    Patient Name: Josias Ramirez  MRN: [id number]  Today's Date: 11/27/2024  Time Calculation  Start Time: 1348  Stop Time: 1426  Time Calculation (min): 38 min       2116/2116-A    Assessment:  End of Session Communication: Bedside nurse  End of Session Patient Position: Up in chair, Alarm on (lunch arrived and pt setup with meal)       Plan:  OT Frequency: 5 times per week  OT Discharge Recommendations: High intensity level of continued care     Subjective      11/27/24 1348   OT Last Visit   OT Received On 11/27/24   General   Reason for Referral B LE weakness and multiple falls   Referred By Elina Jacob, APRN-CNP   Past Medical History Relevant to Rehab past medical history of Adenocarcinoma of left lung (Multi), Cancer (Multi), Cataracts, bilateral, Chronic anemia, Metastasis (Multi), Mixed hyperlipidemia, Nonmelanoma skin cancer, Parotid lymphadenopathy, Pedal edema, Peptic ulcer disease, Recurrent falls, Squamous cell carcinoma of nose, Tobacco use disorder, Type 2 diabetes mellitus, and Vitamin B12 deficiency.   Prior to Session Communication Bedside nurse   Patient Position Received Bed, 3 rail up;Alarm on   General Comment Pt is pleasant, cooperative and agreeable to tx; cleared for participation. Pt fatigues with activity, denies dizziness or feeling lightheaded.   Precautions   Medical Precautions Fall precautions   Vital Signs   Vitals Session During OT   Heart Rate 83   Heart Rate Source Monitor   /74   BP Location Left arm   BP Method Automatic   Patient Position Lying   Vital Signs Comment , /63 once sitting in chair following functional activity   Pain Assessment   0-10 (Numeric) Pain Score 0 - No pain   Cognition   Orientation Level Oriented X4   Grooming   Grooming Level of Assistance Contact guard   Grooming Where Assessed Standing sinkside   Grooming Comments Pt completed light grooming tasks in stance, cues for AD placement and safety in  stance with follow through, commode behind pt for safety and seated rest as needed - did not utilize. Pt tolerated ~4 minutes standing tolerance prior to fatigue and ambulating to recliner.   UE Dressing   UE Dressing Level of Assistance Minimum assistance   UE Dressing Where Assessed Edge of bed   UE Dressing Comments to don gown around back as robe   LE Dressing   LE Dressing Yes   Sock Level of Assistance Maximum assistance   Shoe Level of Assistance Maximum assistance   LE Dressing Where Assessed Edge of bed   LE Dressing Comments Pt reports sister has been helping with LB dressing at home, educated in option of AE to increase I, safety, EC. Plan to trial in further tx sessions.   Toileting   Toileting Comments pt declined   Functional Standing Tolerance   Time ~4 minutes   Activity dynamic standing during ADLs   Functional Standing Tolerance Comments CGA overall, cues for UE support   Bed Mobility 1   Bed Mobility 1 Supine to sitting   Level of Assistance 1 Close supervision   Bed Mobility Comments 1 HOB elevated, increased time   Functional Mobility   Functional Mobility Performed Pt ambulated short distances within room, bed>sink>recliner at fww level, CGA to Min A times as pt fatigues, cues for safe approach to sink/recliner with follow through.   Transfer 1   Technique 1 Sit to stand;Stand to sit   Transfer Device 1 Gait belt;Walker   Transfer Level of Assistance 1 Contact guard   Trials/Comments 1 STS from EOB to fww level with CGA; stand to sit into recliner with CGA, cues for hand placement prior to descent with poor follow through.   Static Sitting Balance   Static Sitting-Balance Support No upper extremity supported   Static Sitting-Level of Assistance Close supervision   Static Standing Balance   Static Standing-Balance Support Bilateral upper extremity supported   Static Standing-Level of Assistance Contact guard   Dynamic Standing Balance   Dynamic Standing-Balance Support   (varying UE support)    Dynamic Standing-Level of Assistance Contact guard   Dynamic Standing-Balance Reaching for objects;Reaching across midline;Turning   Dynamic Standing-Comments cues for UE support on during ADLs to maintain standing balance   IP OT Assessment   End of Session Communication Bedside nurse   End of Session Patient Position Up in chair;Alarm on  (lunch arrived and pt setup with meal)   Inpatient Plan   OT Frequency 5 times per week   OT Discharge Recommendations High intensity level of continued care     Outcome Measures:[facility] Daily Activity  Putting on and taking off regular lower body clothing: A lot  Bathing (including washing, rinsing, drying): A lot  Putting on and taking off regular upper body clothing: A little  Toileting, which includes using toilet, bedpan or urinal: A lot (finney catheter)  Taking care of personal grooming such as brushing teeth: A little  Eating Meals: None  Daily Activity - Total Score: 16  Education Documentation  No documentation found.  Education Comments  No comments found.            Goals:  Encounter Problems       Encounter Problems (Active)       Dressings Lower Extremities       STG - Patient to complete lower body dressing MOD I (Progressing)       Start:  11/25/24    Expected End:  11/30/24               Functional Balance       LTG - Patient will maintain standing and sitting balance to allow for completion of daily activities (Progressing)       Start:  11/25/24    Expected End:  12/09/24               Grooming       STG - Patient completes grooming MOD I (Progressing)       Start:  11/25/24    Expected End:  11/30/24               OT Transfers       STG - Patient will perform toilet transfer MOD I (Progressing)       Start:  11/25/24    Expected End:  12/09/24

## 2024-11-27 NOTE — CARE PLAN
The patient's goals for the shift include sleep, comfort    The clinical goals for the shift include see care plan      Problem: Discharge Planning  Goal: Discharge to home or other facility with appropriate resources  Outcome: Progressing     Problem: Skin  Goal: Decreased wound size/increased tissue granulation at next dressing change  Outcome: Progressing  Flowsheets (Taken 11/26/2024 1756 by Jesse Cabrera, RN)  Decreased wound size/increased tissue granulation at next dressing change:   Promote sleep for wound healing   Protective dressings over bony prominences   Utilize specialty bed per algorithm  Goal: Participates in plan/prevention/treatment measures  Outcome: Progressing  Flowsheets (Taken 11/26/2024 1756 by Jesse Cabrera, RN)  Participates in plan/prevention/treatment measures:   Discuss with provider PT/OT consult   Increase activity/out of bed for meals   Elevate heels  Goal: Prevent/manage excess moisture  Outcome: Progressing  Flowsheets (Taken 11/27/2024 1652)  Prevent/manage excess moisture:   Follow provider orders for dressing changes   Moisturize dry skin   Cleanse incontinence/protect with barrier cream  Goal: Prevent/minimize sheer/friction injuries  Outcome: Progressing  Flowsheets (Taken 11/27/2024 1652)  Prevent/minimize sheer/friction injuries: Complete micro-shifts as needed if patient unable. Adjust patient position to relieve pressure points, not a full turn  Goal: Promote skin healing  Outcome: Progressing  Flowsheets (Taken 11/26/2024 1756 by Jesse Cabrera, RN)  Promote skin healing:   Assess skin/pad under line(s)/device(s)   Protective dressings over bony prominences   Turn/reposition every 2 hours/use positioning/transfer devices   Ensure correct size (line/device) and apply per  instructions   Rotate device position/do not position patient on device     Problem: Diabetes  Goal: Decrease in ketones present in urine by end of shift  Outcome: Progressing  Goal:  Maintain electrolyte levels within acceptable range throughout shift  Outcome: Progressing  Goal: Increase self care and/or family involovement by end of shift  Outcome: Progressing     Problem: Nutrition  Goal: BG  mg/dL  Outcome: Progressing  Goal: Lab values WNL  Outcome: Progressing  Goal: Electrolytes WNL  Outcome: Progressing  Goal: Gradual weight gain  Outcome: Progressing     Problem: Dressings Lower Extremities  Goal: STG - Patient to complete lower body dressing MOD I  Outcome: Progressing     Problem: Grooming  Goal: STG - Patient completes grooming MOD I  Outcome: Progressing     EOS note: Pt transferred from CCU. No reports of pain at this time. Oriented x4 and pleasant. Pt remains with chronic finney in place, will discharge with this. Awaiting appeal to go to SNF. Ambulates as a x1 with the walker. ACHS sugar checks, insulin coverage as needed. VSS    Pt resting at this time. Bed is in lowest position and locked with alarm on. Call light and personal possesions are within reach.

## 2024-11-27 NOTE — DOCUMENTATION CLARIFICATION NOTE
"    PATIENT:               JAMEY GOODWIN  ACCT #:                  [id number]  MRN:                       [id number]  :                       1950  ADMIT DATE:       2024 3:20 PM  DISCH DATE:  RESPONDING PROVIDER #:        [id number]          PROVIDER RESPONSE TEXT:    Toxic encephalopathy 2/2 Zanaflex, hypotension    CDI QUERY TEXT:    Clarification    Instruction:    Based on your assessment of the patient and the clinical information, please provide the requested documentation by clicking on the appropriate radio button and enter any additional information if prompted.    Question: Please further clarify the most likely etiology of the altered mental status as    When answering this query, please exercise your independent professional judgment. The fact that a question is being asked, does not imply that any particular answer is desired or expected.    The patient's clinical indicators include:  Clinical Information:  74M presents for FTT, found to have BPH with outflow obstruction and hematuria    Clinical Indicators:  -  SE, , Andie-Aziz: \"patient's manual BP is 88/30 HR 75, patient is tired and will barely stay awake to answer question...patient is very lethargic, hard to arouse, BP 62/43 HR 70s; stat 500 ml LR bolus ordered over 2 hours...got zanaflex at approx 5 am, at 8 am he had a low BP reading with systolic in the 80s...vitals improved to 85/57 HR 60s; Zanaflex placed on hold...Possibly adverse rxn to Zanaflex\"    Treatment:  LR IVF bolus x1L (), NS IVF bolus x 1L (), NS IVF (), held Zanaflex    Risk Factors:  FTT, BPH with hematuria/outflow obstruction  Options provided:  -- Toxic encephalopathy 2/2 Zanaflex, hypotension  -- Other - I will add my own diagnosis  -- Refer to Clinical Documentation Reviewer    Query created by: Eric Li on 2024 9:40 AM      Electronically signed by:  RUT HUTSON MD 2024 8:55 PM          "

## 2024-11-27 NOTE — CARE PLAN
Problem: Skin  Goal: Decreased wound size/increased tissue granulation at next dressing change  Outcome: Progressing  Goal: Participates in plan/prevention/treatment measures  Outcome: Progressing  Goal: Prevent/manage excess moisture  Outcome: Progressing  Goal: Prevent/minimize sheer/friction injuries  Outcome: Progressing  Goal: Promote/optimize nutrition  Outcome: Met  Goal: Promote skin healing  Outcome: Progressing     Problem: Diabetes  Goal: Maintain electrolyte levels within acceptable range throughout shift  Outcome: Progressing  Goal: Increase self care and/or family involovement by end of shift  Outcome: Progressing  Goal: Receive DSME education by end of shift  Outcome: Met     Problem: Nutrition  Goal: Oral intake greater 75%  Outcome: Met  Goal: Consume prescribed supplement  Outcome: Met  Goal: Adequate PO fluid intake  Outcome: Met  Goal: Lab values WNL  Outcome: Progressing  Goal: Electrolytes WNL  Outcome: Progressing     The patient's goals for the shift include sleep. Patient was able to rest through the night.    The clinical goals for the shift include Pt will remain HDS this shift. Patient is stable and vital signs are wnl. Patient's safety was maintained throughout shift. Bed is in low/locked position and call light and personal possessions are within reach.

## 2024-11-27 NOTE — PROGRESS NOTES
Josias Ramirez is a 74 y.o. male on day 3 of admission presenting with Gait disorder.    Subjective   no cp/SOB       Objective         Current Facility-Administered Medications:     acetaminophen (Tylenol) tablet 650 mg, 650 mg, oral, q4h PRN, 650 mg at 11/24/24 0420 **OR** acetaminophen (Tylenol) oral liquid 650 mg, 650 mg, oral, q4h PRN **OR** acetaminophen (Tylenol) suppository 650 mg, 650 mg, rectal, q4h PRN, VICTOR M Davis-CNP    dextrose 50 % injection 12.5 g, 12.5 g, intravenous, q15 min PRN, VICTOR M Davis-CNP    dextrose 50 % injection 25 g, 25 g, intravenous, q15 min PRN, VICTOR M Davis-CNP    flu vaccine, trivalent, preservative free, HIGH-DOSE, age 65y+ (Fluzone), 0.5 mL, intramuscular, During hospitalization, RAMILA Gordon MD    [Held by provider] gabapentin (Neurontin) capsule 200 mg, 200 mg, oral, TID, VICTOR M Kong-CNP    glucagon (Glucagen) injection 1 mg, 1 mg, intramuscular, q15 min PRN, VICTOR M Davis-CNP    glucagon (Glucagen) injection 1 mg, 1 mg, intramuscular, q15 min PRN, VICTOR M Davis-CNP    insulin lispro injection 0-5 Units, 0-5 Units, subcutaneous, TID AC, ARTEMIO Davis, 1 Units at 11/26/24 1641    lidocaine 4 % patch 2 patch, 2 patch, transdermal, Daily, ARTEMIO Davis, 2 patch at 11/27/24 0939    lisinopril tablet 2.5 mg, 2.5 mg, oral, Daily, ARTEMIO Kong, 2.5 mg at 11/25/24 0915    melatonin tablet 3 mg, 3 mg, oral, Nightly PRN, VICTOR M Davis-CNP    neomycin-bacitracnZn-polymyxnB (Neosporin Original) ointment, , Topical, q8h, ARTEMIO Brooks, 1 Application at 11/26/24 2321    nicotine (Nicoderm CQ) 14 mg/24 hr patch 1 patch, 1 patch, transdermal, Daily, ARTEMIO Davis, 1 patch at 11/27/24 0940    nystatin (Mycostatin) 100,000 unit/mL suspension 500,000 Units, 5 mL, Swish & Swallow, 4x daily, ARTEMIO Davis, 500,000 Units at 11/27/24 0618    potassium  "chloride CR (Klor-Con M20) ER tablet 20 mEq, 20 mEq, oral, Once, Loretokris Yoon, APRN-CNP    SITagliptin phosphate (Januvia) tablet 100 mg, 100 mg, oral, Daily, Elina Jacob, APRN-CNP, 100 mg at 11/27/24 0939    sodium chloride 0.9 % irrigation solution 3,000 mL, 3,000 mL, irrigation, Continuous, Melvin Lyons MD, 3,000 mL at 11/22/24 2320    tamsulosin (Flomax) 24 hr capsule 0.4 mg, 0.4 mg, oral, q PM, Melvin Lyons MD, 0.4 mg at 11/26/24 2048       Physical Exam  HENT:      Head: Normocephalic.   Eyes:      Conjunctiva/sclera: Conjunctivae normal.   Cardiovascular:      Rate and Rhythm: Regular rhythm.   Pulmonary:      Breath sounds: Normal breath sounds.   Abdominal:      General: Bowel sounds are normal.      Palpations: Abdomen is soft.   Musculoskeletal:         General: Normal range of motion.   Skin:     General: Skin is warm and dry.   Neurological:      General: No focal deficit present.      Mental Status: He is alert.   Psychiatric:         Behavior: Behavior normal.           Last Recorded Vitals  Blood pressure 153/71, pulse 72, temperature 36.4 °C (97.5 °F), temperature source Temporal, resp. rate 11, height 1.803 m (5' 11\"), weight 59.7 kg (131 lb 9.8 oz), SpO2 97%.  Intake/Output last 3 Shifts:  I/O last 3 completed shifts:  In: 8293.6 (138.9 mL/kg) [P.O.:1680; Blood:3597.9; IV Piggyback:3015.7]  Out: 1075 (18 mL/kg) [Urine:1075 (0.5 mL/kg/hr)]  Weight: 59.7 kg     Labs:       Results for orders placed or performed during the hospital encounter of 11/22/24 (from the past 24 hours)   POCT GLUCOSE   Result Value Ref Range    POCT Glucose 171 (H) 74 - 99 mg/dL   POCT GLUCOSE   Result Value Ref Range    POCT Glucose 200 (H) 74 - 99 mg/dL   POCT GLUCOSE   Result Value Ref Range    POCT Glucose 167 (H) 74 - 99 mg/dL   Basic metabolic panel   Result Value Ref Range    Glucose 100 (H) 74 - 99 mg/dL    Sodium 136 136 - 145 mmol/L    Potassium 3.5 3.5 - 5.3 mmol/L    Chloride 101 98 - 107 mmol/L "    Bicarbonate 29 21 - 32 mmol/L    Anion Gap 10 10 - 20 mmol/L    Urea Nitrogen 8 6 - 23 mg/dL    Creatinine 0.72 0.50 - 1.30 mg/dL    eGFR >90 >60 mL/min/1.73m*2    Calcium 7.5 (L) 8.6 - 10.3 mg/dL   CBC   Result Value Ref Range    WBC 5.7 4.4 - 11.3 x10*3/uL    nRBC 0.0 0.0 - 0.0 /100 WBCs    RBC 2.77 (L) 4.50 - 5.90 x10*6/uL    Hemoglobin 8.0 (L) 13.5 - 17.5 g/dL    Hematocrit 24.7 (L) 41.0 - 52.0 %    MCV 89 80 - 100 fL    MCH 28.9 26.0 - 34.0 pg    MCHC 32.4 32.0 - 36.0 g/dL    RDW 17.0 (H) 11.5 - 14.5 %    Platelets 193 150 - 450 x10*3/uL   POCT GLUCOSE   Result Value Ref Range    POCT Glucose 127 (H) 74 - 99 mg/dL              Assessment/Plan   Principal Problem:    Gait disorder  Active Problems:    Severe protein-calorie malnutrition (Multi)    Adenocarcinoma of left lung (Multi)    Type 2 diabetes mellitus    Squamous cell carcinoma of nose    Chronic anemia    Gait abnormality    Failure to thrive in adult  Hypotension  AMS due to encephalopathy      PLAN: Pt is much more awake, BP is better, ok to transfer to [facility], [facility] and labs reviewed for now c/w current Rx,  may dc in a day or two, if stable, follow closely.               Silvestre Lubin MD

## 2024-11-27 NOTE — PROGRESS NOTES
Patient has been denied SNF by insurance. Patient is very malnourished with  failure to thrive. In speaking with sister, with whom patient lives, he has not been able to care for himself. He is having frequent falls due to unsteady gait and low blood pressure issues. Patient has a recent diagnosis of lung cancer and has gone through chemo and radiation treatments which has caused the patient to lose over 50 pounds and have profound weakness. Per sister, patient has become incontinent of both urine and stool and is not able to care for himself. Sister is very concerned because she feels patient is not safe to return home at this time. Case management team to file appeal for patient to go to SNF, Will follow for discharge needs.     1430-  Appeal started for patient to go to SNF [facility]. Forms and medical records faxed to [phone] [facility]. Will await response regarding decision. Patient to be transferred to 3N room 3027. Will follow for discharge needs.

## 2024-11-27 NOTE — PROGRESS NOTES
"Nutrition Follow Up Assessment:   Nutrition Assessment         Patient is a 74 y.o. male presenting from home with leg weakness and multiple falls. Had recent hospitalization here on 11/15 after a fall. Plan is for pt to discharge to rehab facility. Pt is undergoing treatment for adenocarcinoma of left lung at [facility].    11/27/2024 Follow up: Chart reviewed and events noted. Plan was for discharge however pt denied SNF stay--now in appeals process.     Past Medical History:   Diagnosis Date    Adenocarcinoma of left lung (Multi)     Cancer (Multi)     Cataracts, bilateral     Chronic anemia     Metastasis (Multi)     Mixed hyperlipidemia     Nonmelanoma skin cancer     Of face, head, right forearm    Parotid lymphadenopathy     Pedal edema     Peptic ulcer disease     Recurrent falls     Squamous cell carcinoma of nose     Tobacco use disorder     Type 2 diabetes mellitus     Vitamin B12 deficiency           Nutrition History:  Energy Intake: Good > 75 %  Food and Nutrient History: Met with pt, up sititng in chair at time of assessment. pt is stating he is very cold and wanting to get back in bed. Pt was seen by dietitian on previous stay (11/15) as well. Nurse stated pt ate well for breakfast this morning. Pt did complain of some mouth pain/mouth sores from cancer treatment. States he is tolerating liquids better; chicken broth, water, juices, boost. States he tries to drink boost a lease twice daily. Pt did receive magic cups on last visit and would like to try those again.  Vitamin/Herbal Supplement Use: none  Food Allergies/Intolerances:  None  GI Symptoms:  none  Oral Problems:  mouth sores from cancer treatment       Anthropometrics:  Height: 180.3 cm (5' 11\")   Weight: 59.7 kg (131 lb 9.8 oz)   BMI (Calculated): 18.36  IBW/kg (Dietitian Calculated): 78.02 kg  Percent of IBW: 76.57 %       Weight History:   11/22/24: 59.7 kg (131 lb 9.8 oz)  11/14/24: 61.2 kg (135 lb)  11/3/24: 67.1 kg (148 lb)  10/2/24: " 67.1 kg (148 lb)  9/4/24: 68.6 kg (151 lb)    Weight Change %:  Weight History / % Weight Change: 3.4% weight loss in 1 week. 11% weight loss over 7 weeks.  Significant Weight Loss: Yes  Interpretation of Weight Loss: >2% in 1 week    Nutrition Focused Physical Exam Findings:  defer: Deferred at pt is wrapped in a blanket and hat on his head stating he is too cold.  Subcutaneous Fat Loss:      Muscle Wasting:     Edema:  Edema: +3 moderate  Edema Location: BLE  Physical Findings:  Hair:  (wearing hat)  Skin: Negative    Nutrition Significant Labs:  CBC Trend:   Results from last 7 days   Lab Units 11/27/24  0557 11/26/24  0353 11/26/24  0033 11/25/24 2125 11/25/24  0528   WBC AUTO x10*3/uL 5.7 6.0  --  6.1 6.1   RBC AUTO x10*6/uL 2.77* 2.92*  --  2.55* 2.96*   HEMOGLOBIN g/dL 8.0* 8.4*   < > 7.5* 8.5*   HEMATOCRIT % 24.7* 26.9*   < > 22.8* 26.1*   MCV fL 89 92  --  89 88   PLATELETS AUTO x10*3/uL 193 181  --  164 172    < > = values in this interval not displayed.    , BMP Trend:   Results from last 7 days   Lab Units 11/27/24  0557 11/26/24  0353 11/25/24 2120 11/25/24  0528   GLUCOSE mg/dL 100* 137* 222* 143*   CALCIUM mg/dL 7.5* 7.4* 7.4* 7.6*   SODIUM mmol/L 136 134* 132* 135*   POTASSIUM mmol/L 3.5 4.0 4.2 3.6   CO2 mmol/L 29 28 30 31   CHLORIDE mmol/L 101 101 98 99   BUN mg/dL 8 11 13 11   CREATININE mg/dL 0.72 0.76 0.96 0.73        Nutrition Specific Medications:  Scheduled medications  influenza, 0.5 mL, intramuscular, During hospitalization  [Held by provider] gabapentin, 200 mg, oral, TID  insulin lispro, 0-5 Units, subcutaneous, TID AC  lidocaine, 2 patch, transdermal, Daily  lisinopril, 2.5 mg, oral, Daily  neomycin-bacitracnZn-polymyxnB, , Topical, q8h  nicotine, 1 patch, transdermal, Daily  nystatin, 5 mL, Swish & Swallow, 4x daily  SITagliptin phosphate, 100 mg, oral, Daily  tamsulosin, 0.4 mg, oral, q PM      Continuous medications  sodium chloride, 3,000 mL      PRN medications  PRN medications:  acetaminophen **OR** acetaminophen **OR** acetaminophen, dextrose, dextrose, glucagon, glucagon, melatonin      I/O:   Last BM Date: 11/26/24; Stool Appearance: Loose, Mucous (11/26/24 1200)    Dietary Orders (From admission, onward)       Start     Ordered    11/25/24 2220  Adult diet Consistent Carb; CCD 75 gm/meal  Diet effective now        Question Answer Comment   Diet type Consistent Carb    Carb diet selection: CCD 75 gm/meal        11/25/24 2220    11/23/24 1307  Oral nutritional supplements  Until discontinued        Comments: Chocolate, berry, or orange   Question Answer Comment   Deliver with Lunch    Deliver with Dinner    Select supplement: Magic Cup        11/23/24 1306    11/23/24 1306  Oral nutritional supplements  Until discontinued        Comments: Prefers strawberry   Question Answer Comment   Deliver with Breakfast    Deliver with Lunch    Deliver with Dinner    Select supplement: Ensure Plus High Protein        11/23/24 1305    11/22/24 1858  May Participate in Room Service  ( ROOM SERVICE MAY PARTICIPATE)  Once        Question:  .  Answer:  Yes    11/22/24 1857                     Estimated Needs:   Total Energy Estimated Needs (kCal):  (0286-1511 kcal)  Method for Estimating Needs: 30-35kcal/kg  Total Protein Estimated Needs (g):  (72-90)  Method for Estimating Needs: 1.2-1.5g/kg  Total Fluid Estimated Needs (mL):  (1mL/kcal/d or as per physician)  Method for Estimating Needs: 1mL/kcal or per physician        Nutrition Diagnosis   Malnutrition Diagnosis  Patient has Malnutrition Diagnosis: Yes  Diagnosis Status: Ongoing  Malnutrition Diagnosis: Severe malnutrition related to acute disease or injury  As Evidenced by: Significant weight loss of 3.4% over 1 week, 11% over 7 weeks and estimate energy intake is meeting <50% of needs            Nutrition Interventions/Recommendations         Nutrition Prescription:  Individualized Nutrition Prescription Provided for : Continue regular diet to  optimize oral intakes. Will provide oral supplements of Ensure + HP and magic cups.. If weight loss continues and enteral nutrition is part of patient POC please see below for recommendations.        Nutrition Interventions:   Interventions: Meals and snacks, Medical food supplement  Meals and Snacks: General healthful diet  Goal: consume >75% of consistent 75g CHO meals  Medical Food Supplement: Commercial beverage, Commercial food  Goal: Ensure + HP with meals (350kcal, 20g pro each), magic cup with lunch and dinner (290kcal, 9g pro each)    Collaboration and Referral of Nutrition Care: Collaboration by nutrition professional with other providers  Goal: REBECCA Stevenson    Nutrition Education:   11/27: Met with pt at bedside; states prefers the strawberry Ensure. Pt states ordering his own meals; denies further questions at present time. Pt hoping for discharge soon.        Nutrition Monitoring and Evaluation   Food/Nutrient Related History Monitoring  Monitoring and Evaluation Plan: Energy intake  Energy Intake: Estimated energy intake  Criteria: consuming >75% of meals and ONS    Body Composition/Growth/Weight History  Monitoring and Evaluation Plan: Weight  Weight: Measured weight  Criteria: maintenance and/or gradual gain    Biochemical Data, Medical Tests and Procedures  Monitoring and Evaluation Plan: Electrolyte/renal panel, Glucose/endocrine profile  Electrolyte and Renal Panel: BUN, Creatinine, Magnesium, Phosphorus, Potassium, Sodium  Criteria: WNR  Glucose/Endocrine Profile: Glucose, casual, Glucose, fasting  Criteria: BG 70-180mg/dL              Time Spent (min): 30 minutes

## 2024-11-28 LAB
ANION GAP SERPL CALC-SCNC: 10 MMOL/L (ref 10–20)
BUN SERPL-MCNC: 8 MG/DL (ref 6–23)
CALCIUM SERPL-MCNC: 7.7 MG/DL (ref 8.6–10.3)
CHLORIDE SERPL-SCNC: 100 MMOL/L (ref 98–107)
CO2 SERPL-SCNC: 29 MMOL/L (ref 21–32)
CREAT SERPL-MCNC: 0.73 MG/DL (ref 0.5–1.3)
EGFRCR SERPLBLD CKD-EPI 2021: >90 ML/MIN/1.73M*2
ERYTHROCYTE [DISTWIDTH] IN BLOOD BY AUTOMATED COUNT: 17.2 % (ref 11.5–14.5)
GLUCOSE BLD MANUAL STRIP-MCNC: 106 MG/DL (ref 74–99)
GLUCOSE BLD MANUAL STRIP-MCNC: 107 MG/DL (ref 74–99)
GLUCOSE BLD MANUAL STRIP-MCNC: 91 MG/DL (ref 74–99)
GLUCOSE BLD MANUAL STRIP-MCNC: 97 MG/DL (ref 74–99)
GLUCOSE SERPL-MCNC: 80 MG/DL (ref 74–99)
HCT VFR BLD AUTO: 25.2 % (ref 41–52)
HGB BLD-MCNC: 8.3 G/DL (ref 13.5–17.5)
MCH RBC QN AUTO: 29.2 PG (ref 26–34)
MCHC RBC AUTO-ENTMCNC: 32.9 G/DL (ref 32–36)
MCV RBC AUTO: 89 FL (ref 80–100)
NRBC BLD-RTO: 0 /100 WBCS (ref 0–0)
PLATELET # BLD AUTO: 192 X10*3/UL (ref 150–450)
POTASSIUM SERPL-SCNC: 3.5 MMOL/L (ref 3.5–5.3)
RBC # BLD AUTO: 2.84 X10*6/UL (ref 4.5–5.9)
SODIUM SERPL-SCNC: 135 MMOL/L (ref 136–145)
WBC # BLD AUTO: 5.6 X10*3/UL (ref 4.4–11.3)

## 2024-11-28 PROCEDURE — 2500000001 HC RX 250 WO HCPCS SELF ADMINISTERED DRUGS (ALT 637 FOR MEDICARE OP): Performed by: NURSE PRACTITIONER

## 2024-11-28 PROCEDURE — 2500000004 HC RX 250 GENERAL PHARMACY W/ HCPCS (ALT 636 FOR OP/ED)

## 2024-11-28 PROCEDURE — 80048 BASIC METABOLIC PNL TOTAL CA: CPT | Performed by: NURSE PRACTITIONER

## 2024-11-28 PROCEDURE — 2500000005 HC RX 250 GENERAL PHARMACY W/O HCPCS: Performed by: NURSE PRACTITIONER

## 2024-11-28 PROCEDURE — 36415 COLL VENOUS BLD VENIPUNCTURE: CPT | Performed by: NURSE PRACTITIONER

## 2024-11-28 PROCEDURE — S4991 NICOTINE PATCH NONLEGEND: HCPCS | Performed by: NURSE PRACTITIONER

## 2024-11-28 PROCEDURE — 2500000002 HC RX 250 W HCPCS SELF ADMINISTERED DRUGS (ALT 637 FOR MEDICARE OP, ALT 636 FOR OP/ED): Performed by: NURSE PRACTITIONER

## 2024-11-28 PROCEDURE — 82947 ASSAY GLUCOSE BLOOD QUANT: CPT

## 2024-11-28 PROCEDURE — 1100000001 HC PRIVATE ROOM DAILY

## 2024-11-28 PROCEDURE — 2500000002 HC RX 250 W HCPCS SELF ADMINISTERED DRUGS (ALT 637 FOR MEDICARE OP, ALT 636 FOR OP/ED): Performed by: UROLOGY

## 2024-11-28 PROCEDURE — 85027 COMPLETE CBC AUTOMATED: CPT | Performed by: NURSE PRACTITIONER

## 2024-11-28 RX ORDER — POTASSIUM CHLORIDE 20 MEQ/1
20 TABLET, EXTENDED RELEASE ORAL ONCE
Status: COMPLETED | OUTPATIENT
Start: 2024-11-28 | End: 2024-11-28

## 2024-11-28 ASSESSMENT — COGNITIVE AND FUNCTIONAL STATUS - GENERAL
DRESSING REGULAR UPPER BODY CLOTHING: A LITTLE
WALKING IN HOSPITAL ROOM: A LITTLE
CLIMB 3 TO 5 STEPS WITH RAILING: A LITTLE
MOBILITY SCORE: 18
CLIMB 3 TO 5 STEPS WITH RAILING: A LITTLE
MOVING TO AND FROM BED TO CHAIR: A LITTLE
TURNING FROM BACK TO SIDE WHILE IN FLAT BAD: A LITTLE
DAILY ACTIVITIY SCORE: 20
MOVING FROM LYING ON BACK TO SITTING ON SIDE OF FLAT BED WITH BEDRAILS: A LITTLE
DRESSING REGULAR LOWER BODY CLOTHING: A LITTLE
STANDING UP FROM CHAIR USING ARMS: A LITTLE
DRESSING REGULAR LOWER BODY CLOTHING: A LITTLE
MOBILITY SCORE: 18
TOILETING: A LITTLE
MOVING FROM LYING ON BACK TO SITTING ON SIDE OF FLAT BED WITH BEDRAILS: A LITTLE
HELP NEEDED FOR BATHING: A LITTLE
STANDING UP FROM CHAIR USING ARMS: A LITTLE
MOVING TO AND FROM BED TO CHAIR: A LITTLE
TURNING FROM BACK TO SIDE WHILE IN FLAT BAD: A LITTLE
HELP NEEDED FOR BATHING: A LITTLE
DRESSING REGULAR UPPER BODY CLOTHING: A LITTLE
TOILETING: A LITTLE
WALKING IN HOSPITAL ROOM: A LITTLE
DAILY ACTIVITIY SCORE: 20

## 2024-11-28 ASSESSMENT — PAIN SCALES - GENERAL
PAINLEVEL_OUTOF10: 0 - NO PAIN
PAINLEVEL_OUTOF10: 0 - NO PAIN

## 2024-11-28 NOTE — CARE PLAN
The patient's goals for the shift include sleep, comfort    The clinical goals for the shift include see care plan      Problem: Discharge Planning  Goal: Discharge to home or other facility with appropriate resources  Outcome: Progressing     Problem: Skin  Goal: Decreased wound size/increased tissue granulation at next dressing change  Outcome: Progressing  Flowsheets (Taken 11/27/2024 2025 by Carolina Lai, RN)  Decreased wound size/increased tissue granulation at next dressing change:   Promote sleep for wound healing   Protective dressings over bony prominences  Goal: Participates in plan/prevention/treatment measures  Outcome: Progressing  Flowsheets (Taken 11/27/2024 2025 by Carolina Lai, RN)  Participates in plan/prevention/treatment measures: Elevate heels  Goal: Prevent/manage excess moisture  Outcome: Progressing  Flowsheets (Taken 11/27/2024 2025 by Carolina Lai, RN)  Prevent/manage excess moisture:   Monitor for/manage infection if present   Follow provider orders for dressing changes  Goal: Prevent/minimize sheer/friction injuries  Outcome: Progressing  Flowsheets (Taken 11/27/2024 2025 by Carolina Lai, RN)  Prevent/minimize sheer/friction injuries: Use pull sheet  Goal: Promote skin healing  Outcome: Progressing  Flowsheets (Taken 11/28/2024 1713)  Promote skin healing: Assess skin/pad under line(s)/device(s)     Problem: Diabetes  Goal: Decrease in ketones present in urine by end of shift  Outcome: Progressing  Goal: Maintain electrolyte levels within acceptable range throughout shift  Outcome: Progressing  Goal: Increase self care and/or family involovement by end of shift  Outcome: Progressing     Problem: Nutrition  Goal: BG  mg/dL  Outcome: Progressing  Goal: Lab values WNL  Outcome: Progressing  Goal: Electrolytes WNL  Outcome: Progressing  Goal: Gradual weight gain  Outcome: Progressing     Problem: Dressings Lower Extremities  Goal: STG - Patient to complete lower body dressing MOD  I  Outcome: Progressing     Problem: Grooming  Goal: STG - Patient completes grooming MOD I  Outcome: Progressing     EOS note: Pt oriented x4 and pleasant. Manning in place with god output. No reports of pain. [facility]waiting to go [facility]. Ambulates as a x1 with the walker. Ate all meals, patient updated on plan of care and is understanding. No reports of nausea. VSS    Pt resting at this time. Bed is in lowest position and locked with alarm on. Call light and personal possesions are within reach.

## 2024-11-28 NOTE — CARE PLAN
The patient's goals for the shift include sleep, comfort    The clinical goals for the shift include See plan of care      Problem: Discharge Planning  Goal: Discharge to home or other facility with appropriate resources  Outcome: Progressing     Problem: Skin  Goal: Decreased wound size/increased tissue granulation at next dressing change  Outcome: Progressing  Flowsheets (Taken 11/27/2024 2025)  Decreased wound size/increased tissue granulation at next dressing change:   Promote sleep for wound healing   Protective dressings over bony prominences  Goal: Participates in plan/prevention/treatment measures  Outcome: Progressing  Flowsheets (Taken 11/27/2024 2025)  Participates in plan/prevention/treatment measures: Elevate heels  Goal: Prevent/manage excess moisture  Outcome: Progressing  Flowsheets (Taken 11/27/2024 2025)  Prevent/manage excess moisture:   Monitor for/manage infection if present   Follow provider orders for dressing changes  Goal: Prevent/minimize sheer/friction injuries  Outcome: Progressing  Flowsheets (Taken 11/27/2024 2025)  Prevent/minimize sheer/friction injuries: Use pull sheet  Goal: Promote skin healing  Outcome: Progressing  Flowsheets (Taken 11/27/2024 2025)  Promote skin healing: Assess skin/pad under line(s)/device(s)     Problem: Diabetes  Goal: Decrease in ketones present in urine by end of shift  Outcome: Progressing  Goal: Maintain electrolyte levels within acceptable range throughout shift  Outcome: Progressing  Goal: Increase self care and/or family involovement by end of shift  Outcome: Progressing     Problem: Nutrition  Goal: BG  mg/dL  Outcome: Progressing  Goal: Lab values WNL  Outcome: Progressing  Goal: Electrolytes WNL  Outcome: Progressing  Goal: Gradual weight gain  Outcome: Progressing     Problem: Dressings Lower Extremities  Goal: STG - Patient to complete lower body dressing MOD I  Outcome: Progressing     Problem: Grooming  Goal: STG - Patient completes  grooming MOD I  Outcome: Progressing     Patient requested something for nausea. [facility] was notified and ordered him Zofran which I administered and it did relieve the patient's nausea. Patient requested medication to help him sleep and was administered PRN Melatonin. Patient had a bowel movement. Patient with finney catheter intact. Patient vital signs stable. Patient safety maintained.

## 2024-11-28 NOTE — PROGRESS NOTES
Occupational Therapy                 Therapy Communication Note    Patient Name: Josias Ramirez  MRN: [id number]  Department: [facility] 3 [facility]  Room: 3007/3007-A  Today's Date: 11/28/2024     Discipline: Occupational Therapy    Missed Visit Reason: Missed Visit Reason: Patient refused    Missed Time: Attempt    Comment: Attempted OT tx this morning, pt pleasantly declined at this time. Assisted with repositioning in bed for improved comfort. Will continue to follow and attempt at next opportunity.

## 2024-11-28 NOTE — PROGRESS NOTES
Josias Ramirez is a 74 y.o. male on day 4 of admission presenting with Gait disorder.    Subjective   No cp       Objective         Current Facility-Administered Medications:     acetaminophen (Tylenol) tablet 650 mg, 650 mg, oral, q4h PRN, 650 mg at 11/24/24 0420 **OR** acetaminophen (Tylenol) oral liquid 650 mg, 650 mg, oral, q4h PRN **OR** acetaminophen (Tylenol) suppository 650 mg, 650 mg, rectal, q4h PRN, VICTOR M Davis-CNP    dextrose 50 % injection 12.5 g, 12.5 g, intravenous, q15 min PRN, Elina Jacob APRN-CNP    dextrose 50 % injection 25 g, 25 g, intravenous, q15 min PRN, VICTOR M Davis-CNP    flu vaccine, trivalent, preservative free, HIGH-DOSE, age 65y+ (Fluzone), 0.5 mL, intramuscular, During hospitalization, RAMILA Gordon MD    [Held by provider] gabapentin (Neurontin) capsule 200 mg, 200 mg, oral, TID, VICTOR M Kong-CNP    glucagon (Glucagen) injection 1 mg, 1 mg, intramuscular, q15 min PRN, VICTOR M Davis-CNP    glucagon (Glucagen) injection 1 mg, 1 mg, intramuscular, q15 min PRN, VICTOR M Davis-CNP    insulin lispro injection 0-5 Units, 0-5 Units, subcutaneous, TID AC, VICTOR M Davis-CNP, 2 Units at 11/27/24 1706    lidocaine 4 % patch 2 patch, 2 patch, transdermal, Daily, VICTOR M Davis-CNP, 2 patch at 11/28/24 0847    lisinopril tablet 2.5 mg, 2.5 mg, oral, Daily, VICTOR M Kong-CNP, 2.5 mg at 11/28/24 0847    melatonin tablet 3 mg, 3 mg, oral, Nightly PRN, VICTOR M Davis-CNP, 3 mg at 11/28/24 0147    neomycin-bacitracnZn-polymyxnB (Neosporin Original) ointment, , Topical, q8h, ARTEMIO Brooks, Given at 11/28/24 1442    nicotine (Nicoderm CQ) 14 mg/24 hr patch 1 patch, 1 patch, transdermal, Daily, ARTEMIO Davis, 1 patch at 11/28/24 0847    nystatin (Mycostatin) 100,000 unit/mL suspension 500,000 Units, 5 mL, Swish & Swallow, 4x daily, ARTEMIO Davis, 500,000 Units at 11/28/24 1225     "ondansetron (Zofran) injection 4 mg, 4 mg, intravenous, q8h PRN, Val Fleming, APRN-CNP, 4 mg at 11/27/24 2156    SITagliptin phosphate (Januvia) tablet 100 mg, 100 mg, oral, Daily, Elina Jacob, APRN-CNP, 100 mg at 11/28/24 0847    sodium chloride 0.9 % irrigation solution 3,000 mL, 3,000 mL, irrigation, Continuous, Melvin Lyons MD, 3,000 mL at 11/22/24 2320    tamsulosin (Flomax) 24 hr capsule 0.4 mg, 0.4 mg, oral, q PM, Melvin Lyons MD, 0.4 mg at 11/27/24 2054       Physical Exam  HENT:      Head: Normocephalic.   Eyes:      Conjunctiva/sclera: Conjunctivae normal.   Cardiovascular:      Rate and Rhythm: Regular rhythm.   Pulmonary:      Breath sounds: Normal breath sounds.   Abdominal:      General: Bowel sounds are normal.      Palpations: Abdomen is soft.   Musculoskeletal:         General: Normal range of motion.   Skin:     General: Skin is warm and dry.   Neurological:      General: No focal deficit present.      Mental Status: He is alert.   Psychiatric:         Behavior: Behavior normal.           Last Recorded Vitals  Blood pressure 124/64, pulse 78, temperature 37.1 °C (98.8 °F), temperature source Temporal, resp. rate 18, height 1.803 m (5' 11\"), weight 59.7 kg (131 lb 9.8 oz), SpO2 98%.  Intake/Output last 3 Shifts:  I/O last 3 completed shifts:  In: 480 (8 mL/kg) [P.O.:480]  Out: 1950 (32.7 mL/kg) [Urine:1950 (0.9 mL/kg/hr)]  Weight: 59.7 kg     Labs:       Results for orders placed or performed during the hospital encounter of 11/22/24 (from the past 24 hours)   POCT GLUCOSE   Result Value Ref Range    POCT Glucose 207 (H) 74 - 99 mg/dL   POCT GLUCOSE   Result Value Ref Range    POCT Glucose 108 (H) 74 - 99 mg/dL   Basic metabolic panel   Result Value Ref Range    Glucose 80 74 - 99 mg/dL    Sodium 135 (L) 136 - 145 mmol/L    Potassium 3.5 3.5 - 5.3 mmol/L    Chloride 100 98 - 107 mmol/L    Bicarbonate 29 21 - 32 mmol/L    Anion Gap 10 10 - 20 mmol/L    Urea Nitrogen 8 6 - 23 mg/dL    " Creatinine 0.73 0.50 - 1.30 mg/dL    eGFR >90 >60 mL/min/1.73m*2    Calcium 7.7 (L) 8.6 - 10.3 mg/dL   CBC   Result Value Ref Range    WBC 5.6 4.4 - 11.3 x10*3/uL    nRBC 0.0 0.0 - 0.0 /100 WBCs    RBC 2.84 (L) 4.50 - 5.90 x10*6/uL    Hemoglobin 8.3 (L) 13.5 - 17.5 g/dL    Hematocrit 25.2 (L) 41.0 - 52.0 %    MCV 89 80 - 100 fL    MCH 29.2 26.0 - 34.0 pg    MCHC 32.9 32.0 - 36.0 g/dL    RDW 17.2 (H) 11.5 - 14.5 %    Platelets 192 150 - 450 x10*3/uL   POCT GLUCOSE   Result Value Ref Range    POCT Glucose 97 74 - 99 mg/dL   POCT GLUCOSE   Result Value Ref Range    POCT Glucose 107 (H) 74 - 99 mg/dL              Assessment/Plan   Principal Problem:    Gait disorder  Active Problems:    Severe protein-calorie malnutrition (Multi)    Adenocarcinoma of left lung (Multi)    Type 2 diabetes mellitus    Squamous cell carcinoma of nose    Chronic anemia    Gait abnormality    Failure to thrive in adult        PLAN: pt is slowly improving, med list and labs reviewed for now c/w current Rx, follow closely, ok to dc when bed available.            Silvestre Lubin MD

## 2024-11-29 VITALS
TEMPERATURE: 97.9 F | DIASTOLIC BLOOD PRESSURE: 68 MMHG | RESPIRATION RATE: 16 BRPM | WEIGHT: 131.61 LBS | BODY MASS INDEX: 18.43 KG/M2 | SYSTOLIC BLOOD PRESSURE: 138 MMHG | OXYGEN SATURATION: 98 % | HEIGHT: 71 IN | HEART RATE: 82 BPM

## 2024-11-29 LAB
ANION GAP SERPL CALC-SCNC: 13 MMOL/L (ref 10–20)
ATRIAL RATE: 81 BPM
BUN SERPL-MCNC: 9 MG/DL (ref 6–23)
CALCIUM SERPL-MCNC: 7.5 MG/DL (ref 8.6–10.3)
CHLORIDE SERPL-SCNC: 100 MMOL/L (ref 98–107)
CO2 SERPL-SCNC: 27 MMOL/L (ref 21–32)
CREAT SERPL-MCNC: 0.73 MG/DL (ref 0.5–1.3)
EGFRCR SERPLBLD CKD-EPI 2021: >90 ML/MIN/1.73M*2
ERYTHROCYTE [DISTWIDTH] IN BLOOD BY AUTOMATED COUNT: 17.3 % (ref 11.5–14.5)
GLUCOSE BLD MANUAL STRIP-MCNC: 80 MG/DL (ref 74–99)
GLUCOSE BLD MANUAL STRIP-MCNC: 89 MG/DL (ref 74–99)
GLUCOSE SERPL-MCNC: 77 MG/DL (ref 74–99)
HCT VFR BLD AUTO: 26.6 % (ref 41–52)
HGB BLD-MCNC: 8.5 G/DL (ref 13.5–17.5)
MCH RBC QN AUTO: 28.7 PG (ref 26–34)
MCHC RBC AUTO-ENTMCNC: 32 G/DL (ref 32–36)
MCV RBC AUTO: 90 FL (ref 80–100)
NRBC BLD-RTO: 0 /100 WBCS (ref 0–0)
P AXIS: 87 DEGREES
P OFFSET: 194 MS
P ONSET: 143 MS
PLATELET # BLD AUTO: 195 X10*3/UL (ref 150–450)
POTASSIUM SERPL-SCNC: 3.6 MMOL/L (ref 3.5–5.3)
PR INTERVAL: 158 MS
Q ONSET: 222 MS
QRS COUNT: 14 BEATS
QRS DURATION: 68 MS
QT INTERVAL: 338 MS
QTC CALCULATION(BAZETT): 392 MS
QTC FREDERICIA: 373 MS
R AXIS: -22 DEGREES
RBC # BLD AUTO: 2.96 X10*6/UL (ref 4.5–5.9)
SODIUM SERPL-SCNC: 136 MMOL/L (ref 136–145)
T AXIS: 52 DEGREES
T OFFSET: 391 MS
VENTRICULAR RATE: 81 BPM
WBC # BLD AUTO: 5.8 X10*3/UL (ref 4.4–11.3)

## 2024-11-29 PROCEDURE — 85027 COMPLETE CBC AUTOMATED: CPT | Performed by: NURSE PRACTITIONER

## 2024-11-29 PROCEDURE — 82374 ASSAY BLOOD CARBON DIOXIDE: CPT | Performed by: NURSE PRACTITIONER

## 2024-11-29 PROCEDURE — 82947 ASSAY GLUCOSE BLOOD QUANT: CPT

## 2024-11-29 PROCEDURE — 2500000001 HC RX 250 WO HCPCS SELF ADMINISTERED DRUGS (ALT 637 FOR MEDICARE OP): Performed by: NURSE PRACTITIONER

## 2024-11-29 PROCEDURE — S4991 NICOTINE PATCH NONLEGEND: HCPCS | Performed by: NURSE PRACTITIONER

## 2024-11-29 PROCEDURE — 2500000005 HC RX 250 GENERAL PHARMACY W/O HCPCS: Performed by: NURSE PRACTITIONER

## 2024-11-29 PROCEDURE — 36415 COLL VENOUS BLD VENIPUNCTURE: CPT | Performed by: NURSE PRACTITIONER

## 2024-11-29 PROCEDURE — 2500000002 HC RX 250 W HCPCS SELF ADMINISTERED DRUGS (ALT 637 FOR MEDICARE OP, ALT 636 FOR OP/ED): Performed by: NURSE PRACTITIONER

## 2024-11-29 ASSESSMENT — PAIN SCALES - GENERAL: PAINLEVEL_OUTOF10: 0 - NO PAIN

## 2024-11-29 NOTE — CARE PLAN
The patient's goals for the shift include sleep, comfort    The clinical goals for the shift include See plan of care      Problem: Discharge Planning  Goal: Discharge to home or other facility with appropriate resources  Outcome: Progressing     Problem: Skin  Goal: Decreased wound size/increased tissue granulation at next dressing change  Outcome: Progressing  Flowsheets (Taken 11/28/2024 1938)  Decreased wound size/increased tissue granulation at next dressing change: Promote sleep for wound healing  Goal: Participates in plan/prevention/treatment measures  Outcome: Progressing  Flowsheets (Taken 11/28/2024 1938)  Participates in plan/prevention/treatment measures: Elevate heels  Goal: Prevent/manage excess moisture  Outcome: Progressing  Flowsheets (Taken 11/28/2024 1938)  Prevent/manage excess moisture:   Cleanse incontinence/protect with barrier cream   Monitor for/manage infection if present   Follow provider orders for dressing changes  Goal: Prevent/minimize sheer/friction injuries  Outcome: Progressing  Flowsheets (Taken 11/28/2024 1938)  Prevent/minimize sheer/friction injuries:   Increase activity/out of bed for meals   Turn/reposition every 2 hours/use positioning/transfer devices  Goal: Promote skin healing  Outcome: Progressing  Flowsheets (Taken 11/28/2024 1938)  Promote skin healing: Assess skin/pad under line(s)/device(s)     Problem: Diabetes  Goal: Decrease in ketones present in urine by end of shift  Outcome: Progressing  Goal: Maintain electrolyte levels within acceptable range throughout shift  Outcome: Progressing  Goal: Increase self care and/or family involovement by end of shift  Outcome: Progressing     Problem: Nutrition  Goal: BG  mg/dL  Outcome: Progressing  Goal: Lab values WNL  Outcome: Progressing  Goal: Electrolytes WNL  Outcome: Progressing  Goal: Gradual weight gain  Outcome: Progressing     Problem: Dressings Lower Extremities  Goal: STG - Patient to complete lower body  dressing MOD I  Outcome: Progressing     Problem: Grooming  Goal: STG - Patient completes grooming MOD I  Outcome: Progressing     Patient requested melatonin to help him sleep at start of shift. Patient however still had a hard time sleeping over night. Patient also verbalized nausea and was medicated once with PRN Zofran which did help relieve most of his nausea. Patient was incontinent of one bowel movement this morning. Patient vital signs stable. Patient safety maintained.

## 2024-11-29 NOTE — PROGRESS NOTES
11/29/24 1053   Discharge Planning   Home or Post Acute Services Post acute facilities (Rehab/SNF/etc)   Type of Post Acute Facility Services Skilled nursing   Expected Discharge Disposition SNF  (ONNO)   Has discharge transport been arranged? Yes   What time is the transport expected? 1130   Intensity of Service   Intensity of Service >30 min     Pt cleared for discharge to [facility] at 11:30. Pt aware and in agreement with the plan. Offered to call his family. Pt declined stating he already called his sister. Bedside nurse and facility aware of discharge and time. Nursing will call report prior to discharge.

## 2024-11-30 ENCOUNTER — HOME CARE VISIT (OUTPATIENT)
Dept: HOME HEALTH SERVICES | Facility: HOME HEALTH | Age: 74
End: 2024-11-30
Payer: COMMERCIAL

## 2024-11-30 LAB
BACTERIA BLD CULT: NORMAL
BACTERIA BLD CULT: NORMAL

## 2024-12-03 ENCOUNTER — NURSING HOME VISIT (OUTPATIENT)
Dept: POST ACUTE CARE | Facility: EXTERNAL LOCATION | Age: 74
End: 2024-12-03
Payer: COMMERCIAL

## 2024-12-03 DIAGNOSIS — R29.6 RECURRENT FALLS: ICD-10-CM

## 2024-12-03 DIAGNOSIS — D64.9 CHRONIC ANEMIA: ICD-10-CM

## 2024-12-03 DIAGNOSIS — R33.9 URINARY RETENTION: ICD-10-CM

## 2024-12-03 DIAGNOSIS — E78.5 TYPE 2 DIABETES MELLITUS WITH HYPERLIPIDEMIA (MULTI): ICD-10-CM

## 2024-12-03 DIAGNOSIS — R53.1 GENERALIZED WEAKNESS: Primary | ICD-10-CM

## 2024-12-03 DIAGNOSIS — E11.69 TYPE 2 DIABETES MELLITUS WITH HYPERLIPIDEMIA (MULTI): ICD-10-CM

## 2024-12-03 DIAGNOSIS — G89.29 OTHER CHRONIC PAIN: ICD-10-CM

## 2024-12-03 PROCEDURE — 99305 1ST NF CARE MODERATE MDM 35: CPT | Performed by: STUDENT IN AN ORGANIZED HEALTH CARE EDUCATION/TRAINING PROGRAM

## 2024-12-04 ENCOUNTER — HOME CARE VISIT (OUTPATIENT)
Dept: HOME HEALTH SERVICES | Facility: HOME HEALTH | Age: 74
End: 2024-12-04
Payer: COMMERCIAL

## 2024-12-04 NOTE — PROGRESS NOTES
It was a pleasure to see Mr. Ramirez at the [facility] at [facility].     He is a really nice 74 y.o. male squamous cell carcinoma of the nose, severe protein calorie malnutrition, urinary incontinence who is getting in with bilateral lower extremity weakness for 2 to 3 weeks.    Patient was seen in the ED in November which showed severe cervical spine stenosis on the CT scan with ossification of the posterior articular ligament.  His course was complicated by urinary tension where a catheter was placed and was told to follow-up with urology for this.  His course was also complicated by hypotension and hematuria requiring blood transfusion and was thought to be a possible adverse reaction to Zanaflex.    His hematuria is thought to be due to BPH with hematuria/outflow obstruction.  Though no clear reason for his urine retention at that time.    Currently does not have any numbness and tingling in his upper extremities or lower extremities though he has severe lower extremity weakness has been going on for the past 2 to 3 weeks.  A month ago he says he was walking independently with no issues.    He had recurrent falls and now requires a wheelchair to get around.  Roughly 2 weeks ago he was using a walker and a cane to ambulate.    The urinary retention started at his last hospital admission.  He is unable to lift his legs approximately.    Numbness and/or tingling - NO    Weakness : YES    Bladder/Bowel symptoms - NO    The patient has tried medications (eg: gabapentin, NSAIDS and narcotics ) : No  PT : Yes    Date: now  Pain Management with ESIs/selective nerve blocks  - NO    he is a NON-SMOKER and DIABETIC    History of Depression : NO    PRIOR SPINE SURGERY: YES pollo many years ago.    Denies use of Aspirin, Coumadin, or Plavix or any other anticoagulants     NARCOTICS for pain : NO    Part of this patient’s history is from personal review of the patient’s previous charts.      Past  Medical History:   Diagnosis Date    Adenocarcinoma of left lung (Multi)     Cancer (Multi)     Cataracts, bilateral     Chronic anemia     Metastasis (Multi)     Mixed hyperlipidemia     Nonmelanoma skin cancer     Of face, head, right forearm    Parotid lymphadenopathy     Pedal edema     Peptic ulcer disease     Recurrent falls     Squamous cell carcinoma of nose     Tobacco use disorder     Type 2 diabetes mellitus     Vitamin B12 deficiency            Current Outpatient Medications:     acetaminophen (Tylenol) 325 mg tablet, Take 2 tablets (650 mg) by mouth every 4 hours if needed for mild pain (1 - 3), moderate pain (4 - 6) or headaches., Disp: , Rfl:     gabapentin (Neurontin) 800 mg tablet, Take 1 tablet (800 mg) by mouth 3 times a day., Disp: , Rfl:     lidocaine 4 % patch, Place 2 patches over 12 hours on the skin once daily. Remove & discard patch within 12 hours or as directed by MD., Disp: 30 patch, Rfl: 0    lidocaine-diphenhydrAMINE-Maalox 1:1:1 Magic Mouthwash, Swish and spit 10 mL 4 times a day as needed (pain)., Disp: , Rfl:     lisinopril 2.5 mg tablet, Take 1 tablet (2.5 mg) by mouth once daily., Disp: , Rfl:     neomycin-bacitracnZn-polymyxnB (Neosporin Original) ointment, Apply topically every 8 hours., Disp: , Rfl:     nicotine (Nicoderm CQ) 14 mg/24 hr patch, Place 1 patch over 24 hours on the skin once daily., Disp: 28 patch, Rfl: 0    nystatin (Mycostatin) 100,000 unit/mL suspension, Take 5 mL (500,000 Units) by mouth 4 times a day., Disp: , Rfl:     SITagliptin phosphate (Januvia) 100 mg tablet, Take 1 tablet (100 mg) by mouth once daily., Disp: , Rfl:     tamsulosin (Flomax) 0.4 mg 24 hr capsule, Take 1 capsule (0.4 mg) by mouth once daily., Disp: 30 capsule, Rfl: 0      Review of Systems :         EXAM:   GENERAL: Cachectic appearing, Band-Aid on top of his nose   RESPIRATORY: normal respiratory effort; no audible wheezes/rhonchi  EXTREMITIES: normal pulses, thin and cachectic appearing  with bony prominences evident  SKIN: Skin lesion evident on his nose    MUSCULOSKELETAL:   Range of motion of the cervical spine is normal and painless.   Paraspinal muscle atrophy  Lhermitte Sign: Absent  NO scars in the neck.  Obvious atrophy in his lower extremities and a bit in his upper extremities  Unable to assess his posture secondary to lower extremity weakness requiring wheelchair    Gait: Unable to assess secondary to weakness    NEUROLOGIC: Patient is awake, alert and oriented to name, place and time.  No obvious cranial nerve deficit.    MOTOR BUE 4/5 diffusely, HG bilaterally 3/5, b/l intrinsics 2/5  BLE HF 1 KE 4- DF 3 PF 4+ EHL 4  No sanchez, hyperreflexic with 3+ knee jerk       Bulk: thin appearing  Tone: Normal  No Sanchez  No pronator drift  Reflexes are symmetric throughout.   Sensory exam shows no gross dermatomal sensory deficits to light touch and pain       IMAGING:   Cervical spine CT showing diffuse ossification of posterior longitudinal ligament causing severe canal stenosis in the cervical spine    ASSESSMENT AND PLAN:  Patient is 74-year-old male with history of squamous cell cancer of the nose and has completed chemotherapy according to the patient, new urinary tension and new lower extremity weakness for the past 2 to 4 weeks.  In the setting of his ossification of posterior longitudinal ligament in the cervical spine I am concerned that he has severe spinal cord compression secondary to this.  He has had progressive weakness and gait instability causing recurrent falls as well as hyperreflexia on exam.    I am also concerned that his relatively rapid progression of his weakness is secondary to this severe OPLL.  At his last hospitalization he had near retention requiring a catheter that is still placed at this time.  I am also concerned that this urinary tension is likely secondary to his cervical spine as his can be because of severe cervical myelopathy and cord compression.    I am  ordering a MRI cervical spine to evaluate for cervical myelopathy and spinal cord compression.  I will call him with the results immediately after this is done.  Encouraged him to get the imaging done as soon as possible and I am ordering a stat imaging.          Drew Vick MD, [facility]   of Neurological Surgery  [employer] [facility] [employer]  Attending Surgeon  Director - Minimally Invasive Spine Surgery  [place], [place]      Some of this note was completed using Dragon voice recognition technology and sometimes the software misinterprets words. This may include unintended errors with respect to translation of words, typographical errors or grammar errors which may not have been identified prior to finalization of the chart note. Please take this into account when reading this note

## 2024-12-05 ENCOUNTER — OFFICE VISIT (OUTPATIENT)
Dept: NEUROSURGERY | Facility: CLINIC | Age: 74
End: 2024-12-05
Payer: COMMERCIAL

## 2024-12-05 ENCOUNTER — APPOINTMENT (OUTPATIENT)
Dept: NEUROSURGERY | Facility: CLINIC | Age: 74
End: 2024-12-05
Payer: COMMERCIAL

## 2024-12-05 VITALS — BODY MASS INDEX: 16.39 KG/M2 | HEART RATE: 83 BPM | HEIGHT: 72 IN | WEIGHT: 121 LBS | RESPIRATION RATE: 20 BRPM

## 2024-12-05 DIAGNOSIS — D18.1 HYGROMA: ICD-10-CM

## 2024-12-05 DIAGNOSIS — M48.8X2 OSSIFICATION OF POSTERIOR LONGITUDINAL LIGAMENT IN CERVICAL REGION: Primary | ICD-10-CM

## 2024-12-05 DIAGNOSIS — G95.9 CERVICAL MYELOPATHY: ICD-10-CM

## 2024-12-05 PROCEDURE — 1111F DSCHRG MED/CURRENT MED MERGE: CPT | Performed by: NEUROLOGICAL SURGERY

## 2024-12-05 PROCEDURE — 99215 OFFICE O/P EST HI 40 MIN: CPT | Mod: GC | Performed by: NEUROLOGICAL SURGERY

## 2024-12-05 PROCEDURE — 1157F ADVNC CARE PLAN IN RCRD: CPT | Performed by: NEUROLOGICAL SURGERY

## 2024-12-05 PROCEDURE — 1159F MED LIST DOCD IN RCRD: CPT | Performed by: NEUROLOGICAL SURGERY

## 2024-12-05 PROCEDURE — 1125F AMNT PAIN NOTED PAIN PRSNT: CPT | Performed by: NEUROLOGICAL SURGERY

## 2024-12-05 PROCEDURE — 4010F ACE/ARB THERAPY RXD/TAKEN: CPT | Performed by: NEUROLOGICAL SURGERY

## 2024-12-05 PROCEDURE — 99205 OFFICE O/P NEW HI 60 MIN: CPT | Performed by: NEUROLOGICAL SURGERY

## 2024-12-05 PROCEDURE — 4004F PT TOBACCO SCREEN RCVD TLK: CPT | Performed by: NEUROLOGICAL SURGERY

## 2024-12-05 ASSESSMENT — PAIN SCALES - GENERAL: PAINLEVEL_OUTOF10: 6

## 2024-12-09 ENCOUNTER — HOME CARE VISIT (OUTPATIENT)
Dept: HOME HEALTH SERVICES | Facility: HOME HEALTH | Age: 74
End: 2024-12-09
Payer: COMMERCIAL

## 2024-12-21 LAB
ATRIAL RATE: 67 BPM
ATRIAL RATE: 72 BPM
P AXIS: 83 DEGREES
P OFFSET: 186 MS
P OFFSET: 192 MS
P ONSET: 149 MS
P ONSET: 149 MS
PR INTERVAL: 150 MS
PR INTERVAL: 154 MS
Q ONSET: 224 MS
Q ONSET: 226 MS
QRS COUNT: 11 BEATS
QRS COUNT: 12 BEATS
QRS DURATION: 66 MS
QRS DURATION: 70 MS
QT INTERVAL: 380 MS
QT INTERVAL: 384 MS
QTC CALCULATION(BAZETT): 405 MS
QTC CALCULATION(BAZETT): 416 MS
QTC FREDERICIA: 398 MS
QTC FREDERICIA: 404 MS
R AXIS: -5 DEGREES
R AXIS: 4 DEGREES
T AXIS: -81 DEGREES
T AXIS: 77 DEGREES
T OFFSET: 416 MS
T OFFSET: 416 MS
VENTRICULAR RATE: 67 BPM
VENTRICULAR RATE: 72 BPM

## 2024-12-23 NOTE — DISCHARGE SUMMARY
Discharge Diagnosis  Gait disorder, Severe protein-calorie malnutrition (Multi), Adenocarcinoma of left lung (Multi)    Type 2 diabetes mellitus, Squamous cell carcinoma of nose, Chronic anemia,        Issues Requiring Follow-Up  Gait disorder, Severe protein-calorie malnutrition (Multi), Adenocarcinoma of left lung (Multi)    Type 2 diabetes mellitus, Squamous cell carcinoma of nose, Chronic anemia,     Discharge Meds     Medication List      START taking these medications     acetaminophen 325 mg tablet; Commonly known as: Tylenol; Take 2 tablets   (650 mg) by mouth every 4 hours if needed for mild pain (1 - 3), moderate   pain (4 - 6) or headaches.   neomycin-bacitracnZn-polymyxnB ointment; Commonly known as: Neosporin   Original; Apply topically every 8 hours.     CONTINUE taking these medications     gabapentin 800 mg tablet; Commonly known as: Neurontin   lidocaine 4 % patch; Place 2 patches over 12 hours on the skin once   daily. Remove & discard patch within 12 hours or as directed by MD.   lidocaine-diphenhydrAMINE-Maalox 1:1:1 666--40 mg/30 mL liquid   mouthwash; Commonly known as: Magic Mouthwash   lisinopril 2.5 mg tablet   nicotine 14 mg/24 hr patch; Commonly known as: Nicoderm CQ; Place 1   patch over 24 hours on the skin once daily.   nystatin 100,000 unit/mL suspension; Commonly known as: Mycostatin   SITagliptin phosphate 100 mg tablet; Commonly known as: Januvia     STOP taking these medications     methylPREDNISolone 4 mg tablets; Commonly known as: Medrol Dospak   tiZANidine 4 mg tablet; Commonly known as: Zanaflex     ASK your doctor about these medications     tamsulosin 0.4 mg 24 hr capsule; Commonly known as: Flomax; Take 1   capsule (0.4 mg) by mouth once daily.; Ask about: Should I take this   medication?       Test Results Pending At Discharge  Pending Labs       No current pending labs.            Hospital Course   74 y.o. male with PMHX  advanced cutaneous squamous cell carcinoma  of the nose, right-sided parotid adenopathy, adenocarcinoma of left lung currently on chemoradiation therapy (follows with heme-onc/radiation[facility]onc at [facility]), tobacco use disorder, non-insulin-dependent type 2 diabetes mellitus (A1c 9.2 4/2024), chronic anemia (baseline Hgb between 9-11), mixed hyperlipidemia, peptic ulcer disease, vitamin B12 deficiency and self reported chronic pedal edema  presenting to [facility] from home   with leg weakess and multiple falls.  Patient was evaluated by wound care team and urology due to hematuria.  He was started on CBI his condition slowly improved was DC'd to [facility] in relatively stable condition for further care and follow-up as an outpatient.    Pertinent Physical Exam At Time of Discharge  Physical Exam  HENT:      Head: Normocephalic.      Mouth/Throat:      Pharynx: Oropharynx is clear.   Eyes:      Conjunctiva/sclera: Conjunctivae normal.   Cardiovascular:      Rate and Rhythm: Regular rhythm.   Pulmonary:      Breath sounds: Normal breath sounds.   Abdominal:      General: Bowel sounds are normal.      Palpations: Abdomen is soft.   Musculoskeletal:         General: Normal range of motion.   Skin:     General: Skin is warm and dry.   Neurological:      General: No focal deficit present.      Mental Status: He is alert.   Psychiatric:         Behavior: Behavior normal.         Outpatient Follow-Up  Follow-up with PCP      Silvestre Lubin MD

## 2025-01-20 ENCOUNTER — NURSING HOME VISIT (OUTPATIENT)
Dept: POST ACUTE CARE | Facility: EXTERNAL LOCATION | Age: 75
End: 2025-01-20
Payer: COMMERCIAL

## 2025-01-20 DIAGNOSIS — A04.72 C. DIFFICILE DIARRHEA: Primary | ICD-10-CM

## 2025-01-20 PROCEDURE — 99348 HOME/RES VST EST LOW MDM 30: CPT | Performed by: STUDENT IN AN ORGANIZED HEALTH CARE EDUCATION/TRAINING PROGRAM

## 2025-02-10 ENCOUNTER — HOSPITAL ENCOUNTER (INPATIENT)
Facility: HOSPITAL | Age: 75
LOS: 5 days | Discharge: NURSING, REHAB OR LONG-TERM CARE | DRG: 299 | End: 2025-02-16
Attending: EMERGENCY MEDICINE | Admitting: INTERNAL MEDICINE
Payer: COMMERCIAL

## 2025-02-10 ENCOUNTER — APPOINTMENT (OUTPATIENT)
Dept: RADIOLOGY | Facility: HOSPITAL | Age: 75
DRG: 299 | End: 2025-02-10
Payer: COMMERCIAL

## 2025-02-10 ENCOUNTER — APPOINTMENT (OUTPATIENT)
Dept: CARDIOLOGY | Facility: HOSPITAL | Age: 75
DRG: 299 | End: 2025-02-10
Payer: COMMERCIAL

## 2025-02-10 DIAGNOSIS — I73.9 PAD (PERIPHERAL ARTERY DISEASE) (CMS-HCC): ICD-10-CM

## 2025-02-10 DIAGNOSIS — K52.9 COLITIS: ICD-10-CM

## 2025-02-10 DIAGNOSIS — M79.605 PAIN IN LEFT LEG: ICD-10-CM

## 2025-02-10 DIAGNOSIS — K52.9 ACUTE COLITIS: ICD-10-CM

## 2025-02-10 DIAGNOSIS — I82.411 ACUTE DEEP VEIN THROMBOSIS (DVT) OF FEMORAL VEIN OF RIGHT LOWER EXTREMITY (MULTI): ICD-10-CM

## 2025-02-10 DIAGNOSIS — I70.202 FEMORAL ARTERY OCCLUSION, LEFT (CMS-HCC): ICD-10-CM

## 2025-02-10 DIAGNOSIS — K13.79 MOUTH SORE: ICD-10-CM

## 2025-02-10 DIAGNOSIS — R10.84 GENERALIZED ABDOMINAL PAIN: Primary | ICD-10-CM

## 2025-02-10 DIAGNOSIS — I95.9 HYPOTENSION, UNSPECIFIED HYPOTENSION TYPE: ICD-10-CM

## 2025-02-10 DIAGNOSIS — R91.1 LUNG NODULE: ICD-10-CM

## 2025-02-10 PROBLEM — I82.409 DVT (DEEP VENOUS THROMBOSIS) (MULTI): Status: ACTIVE | Noted: 2025-02-10

## 2025-02-10 LAB
ALBUMIN SERPL BCP-MCNC: 2.1 G/DL (ref 3.4–5)
ALP SERPL-CCNC: 73 U/L (ref 33–136)
ALT SERPL W P-5'-P-CCNC: 9 U/L (ref 10–52)
ANION GAP SERPL CALC-SCNC: 14 MMOL/L (ref 10–20)
APPEARANCE UR: CLEAR
AST SERPL W P-5'-P-CCNC: 18 U/L (ref 9–39)
ATRIAL RATE: 97 BPM
BASOPHILS # BLD AUTO: 0.03 X10*3/UL (ref 0–0.1)
BASOPHILS NFR BLD AUTO: 0.4 %
BILIRUB SERPL-MCNC: 0.5 MG/DL (ref 0–1.2)
BILIRUB UR STRIP.AUTO-MCNC: NEGATIVE MG/DL
BUN SERPL-MCNC: 34 MG/DL (ref 6–23)
CALCIUM SERPL-MCNC: 7.6 MG/DL (ref 8.6–10.3)
CARDIAC TROPONIN I PNL SERPL HS: 6 NG/L (ref 0–20)
CHLORIDE SERPL-SCNC: 103 MMOL/L (ref 98–107)
CO2 SERPL-SCNC: 25 MMOL/L (ref 21–32)
COLOR UR: YELLOW
CREAT SERPL-MCNC: 1.21 MG/DL (ref 0.5–1.3)
EGFRCR SERPLBLD CKD-EPI 2021: 63 ML/MIN/1.73M*2
EOSINOPHIL # BLD AUTO: 0.05 X10*3/UL (ref 0–0.4)
EOSINOPHIL NFR BLD AUTO: 0.7 %
ERYTHROCYTE [DISTWIDTH] IN BLOOD BY AUTOMATED COUNT: 14.9 % (ref 11.5–14.5)
GLUCOSE BLD MANUAL STRIP-MCNC: 114 MG/DL (ref 74–99)
GLUCOSE BLD MANUAL STRIP-MCNC: 93 MG/DL (ref 74–99)
GLUCOSE SERPL-MCNC: 99 MG/DL (ref 74–99)
GLUCOSE UR STRIP.AUTO-MCNC: NORMAL MG/DL
HCT VFR BLD AUTO: 32.3 % (ref 41–52)
HGB BLD-MCNC: 9.8 G/DL (ref 13.5–17.5)
HOLD SPECIMEN: NORMAL
HOLD SPECIMEN: NORMAL
HYALINE CASTS #/AREA URNS AUTO: ABNORMAL /LPF
IMM GRANULOCYTES # BLD AUTO: 0.06 X10*3/UL (ref 0–0.5)
IMM GRANULOCYTES NFR BLD AUTO: 0.8 % (ref 0–0.9)
KETONES UR STRIP.AUTO-MCNC: NEGATIVE MG/DL
LACTATE SERPL-SCNC: 0.6 MMOL/L (ref 0.4–2)
LEUKOCYTE ESTERASE UR QL STRIP.AUTO: NEGATIVE
LIPASE SERPL-CCNC: 4 U/L (ref 9–82)
LYMPHOCYTES # BLD AUTO: 0.81 X10*3/UL (ref 0.8–3)
LYMPHOCYTES NFR BLD AUTO: 11.1 %
MCH RBC QN AUTO: 30.1 PG (ref 26–34)
MCHC RBC AUTO-ENTMCNC: 30.3 G/DL (ref 32–36)
MCV RBC AUTO: 99 FL (ref 80–100)
MONOCYTES # BLD AUTO: 0.46 X10*3/UL (ref 0.05–0.8)
MONOCYTES NFR BLD AUTO: 6.3 %
MUCOUS THREADS #/AREA URNS AUTO: ABNORMAL /LPF
NEUTROPHILS # BLD AUTO: 5.92 X10*3/UL (ref 1.6–5.5)
NEUTROPHILS NFR BLD AUTO: 80.7 %
NITRITE UR QL STRIP.AUTO: NEGATIVE
NRBC BLD-RTO: 0 /100 WBCS (ref 0–0)
P AXIS: 86 DEGREES
P OFFSET: 197 MS
P ONSET: 137 MS
PH UR STRIP.AUTO: 5.5 [PH]
PLATELET # BLD AUTO: 207 X10*3/UL (ref 150–450)
POTASSIUM SERPL-SCNC: 4.4 MMOL/L (ref 3.5–5.3)
POTASSIUM SERPL-SCNC: 5.6 MMOL/L (ref 3.5–5.3)
PR INTERVAL: 182 MS
PROT SERPL-MCNC: 5.2 G/DL (ref 6.4–8.2)
PROT UR STRIP.AUTO-MCNC: ABNORMAL MG/DL
Q ONSET: 228 MS
QRS COUNT: 16 BEATS
QRS DURATION: 66 MS
QT INTERVAL: 336 MS
QTC CALCULATION(BAZETT): 426 MS
QTC FREDERICIA: 394 MS
R AXIS: -16 DEGREES
RBC # BLD AUTO: 3.26 X10*6/UL (ref 4.5–5.9)
RBC # UR STRIP.AUTO: ABNORMAL MG/DL
RBC #/AREA URNS AUTO: ABNORMAL /HPF
SODIUM SERPL-SCNC: 136 MMOL/L (ref 136–145)
SP GR UR STRIP.AUTO: 1.03
T AXIS: 69 DEGREES
T OFFSET: 396 MS
UROBILINOGEN UR STRIP.AUTO-MCNC: NORMAL MG/DL
VENTRICULAR RATE: 97 BPM
WBC # BLD AUTO: 7.3 X10*3/UL (ref 4.4–11.3)
WBC #/AREA URNS AUTO: ABNORMAL /HPF

## 2025-02-10 PROCEDURE — 87493 C DIFF AMPLIFIED PROBE: CPT | Mod: STJLAB | Performed by: NURSE PRACTITIONER

## 2025-02-10 PROCEDURE — 84132 ASSAY OF SERUM POTASSIUM: CPT

## 2025-02-10 PROCEDURE — 96361 HYDRATE IV INFUSION ADD-ON: CPT

## 2025-02-10 PROCEDURE — G0378 HOSPITAL OBSERVATION PER HR: HCPCS

## 2025-02-10 PROCEDURE — 71275 CT ANGIOGRAPHY CHEST: CPT

## 2025-02-10 PROCEDURE — 2500000002 HC RX 250 W HCPCS SELF ADMINISTERED DRUGS (ALT 637 FOR MEDICARE OP, ALT 636 FOR OP/ED): Performed by: NURSE PRACTITIONER

## 2025-02-10 PROCEDURE — 71275 CT ANGIOGRAPHY CHEST: CPT | Performed by: RADIOLOGY

## 2025-02-10 PROCEDURE — 2500000004 HC RX 250 GENERAL PHARMACY W/ HCPCS (ALT 636 FOR OP/ED): Performed by: NURSE PRACTITIONER

## 2025-02-10 PROCEDURE — 96376 TX/PRO/DX INJ SAME DRUG ADON: CPT

## 2025-02-10 PROCEDURE — 2500000004 HC RX 250 GENERAL PHARMACY W/ HCPCS (ALT 636 FOR OP/ED): Performed by: EMERGENCY MEDICINE

## 2025-02-10 PROCEDURE — 36415 COLL VENOUS BLD VENIPUNCTURE: CPT

## 2025-02-10 PROCEDURE — 93971 EXTREMITY STUDY: CPT

## 2025-02-10 PROCEDURE — 96372 THER/PROPH/DIAG INJ SC/IM: CPT | Performed by: NURSE PRACTITIONER

## 2025-02-10 PROCEDURE — 96372 THER/PROPH/DIAG INJ SC/IM: CPT | Performed by: EMERGENCY MEDICINE

## 2025-02-10 PROCEDURE — 93971 EXTREMITY STUDY: CPT | Performed by: SURGERY

## 2025-02-10 PROCEDURE — 83690 ASSAY OF LIPASE: CPT

## 2025-02-10 PROCEDURE — 87506 IADNA-DNA/RNA PROBE TQ 6-11: CPT | Mod: STJLAB | Performed by: NURSE PRACTITIONER

## 2025-02-10 PROCEDURE — 83605 ASSAY OF LACTIC ACID: CPT

## 2025-02-10 PROCEDURE — 2550000001 HC RX 255 CONTRASTS: Performed by: EMERGENCY MEDICINE

## 2025-02-10 PROCEDURE — 99221 1ST HOSP IP/OBS SF/LOW 40: CPT | Performed by: NURSE PRACTITIONER

## 2025-02-10 PROCEDURE — 2500000001 HC RX 250 WO HCPCS SELF ADMINISTERED DRUGS (ALT 637 FOR MEDICARE OP)

## 2025-02-10 PROCEDURE — 93005 ELECTROCARDIOGRAM TRACING: CPT

## 2025-02-10 PROCEDURE — 2500000004 HC RX 250 GENERAL PHARMACY W/ HCPCS (ALT 636 FOR OP/ED): Performed by: INTERNAL MEDICINE

## 2025-02-10 PROCEDURE — 99232 SBSQ HOSP IP/OBS MODERATE 35: CPT | Performed by: NURSE PRACTITIONER

## 2025-02-10 PROCEDURE — 99285 EMERGENCY DEPT VISIT HI MDM: CPT | Mod: 25 | Performed by: EMERGENCY MEDICINE

## 2025-02-10 PROCEDURE — 82947 ASSAY GLUCOSE BLOOD QUANT: CPT

## 2025-02-10 PROCEDURE — 81001 URINALYSIS AUTO W/SCOPE: CPT

## 2025-02-10 PROCEDURE — 2500000004 HC RX 250 GENERAL PHARMACY W/ HCPCS (ALT 636 FOR OP/ED)

## 2025-02-10 PROCEDURE — 96374 THER/PROPH/DIAG INJ IV PUSH: CPT | Mod: 59

## 2025-02-10 PROCEDURE — 74177 CT ABD & PELVIS W/CONTRAST: CPT | Performed by: RADIOLOGY

## 2025-02-10 PROCEDURE — 84484 ASSAY OF TROPONIN QUANT: CPT

## 2025-02-10 PROCEDURE — 2500000005 HC RX 250 GENERAL PHARMACY W/O HCPCS: Performed by: NURSE PRACTITIONER

## 2025-02-10 PROCEDURE — 99285 EMERGENCY DEPT VISIT HI MDM: CPT | Performed by: EMERGENCY MEDICINE

## 2025-02-10 PROCEDURE — 2500000001 HC RX 250 WO HCPCS SELF ADMINISTERED DRUGS (ALT 637 FOR MEDICARE OP): Performed by: NURSE PRACTITIONER

## 2025-02-10 PROCEDURE — 80053 COMPREHEN METABOLIC PANEL: CPT

## 2025-02-10 PROCEDURE — 85025 COMPLETE CBC W/AUTO DIFF WBC: CPT

## 2025-02-10 PROCEDURE — 74177 CT ABD & PELVIS W/CONTRAST: CPT

## 2025-02-10 RX ORDER — ALUMINUM HYDROXIDE, MAGNESIUM HYDROXIDE, AND SIMETHICONE 1200; 120; 1200 MG/30ML; MG/30ML; MG/30ML
30 SUSPENSION ORAL EVERY 4 HOURS PRN
COMMUNITY
End: 2025-02-16 | Stop reason: HOSPADM

## 2025-02-10 RX ORDER — ADHESIVE BANDAGE
30 BANDAGE TOPICAL DAILY PRN
Status: DISCONTINUED | OUTPATIENT
Start: 2025-02-10 | End: 2025-02-16 | Stop reason: HOSPADM

## 2025-02-10 RX ORDER — ACETAMINOPHEN 325 MG/1
650 TABLET ORAL EVERY 4 HOURS PRN
Status: DISCONTINUED | OUTPATIENT
Start: 2025-02-10 | End: 2025-02-16 | Stop reason: HOSPADM

## 2025-02-10 RX ORDER — METRONIDAZOLE 500 MG/100ML
500 INJECTION, SOLUTION INTRAVENOUS EVERY 8 HOURS
Status: DISCONTINUED | OUTPATIENT
Start: 2025-02-10 | End: 2025-02-11

## 2025-02-10 RX ORDER — DEXTROSE 50 % IN WATER (D50W) INTRAVENOUS SYRINGE
12.5
Status: DISCONTINUED | OUTPATIENT
Start: 2025-02-10 | End: 2025-02-16 | Stop reason: HOSPADM

## 2025-02-10 RX ORDER — LIDOCAINE HYDROCHLORIDE 20 MG/ML
15 SOLUTION OROPHARYNGEAL EVERY 6 HOURS PRN
Status: DISCONTINUED | OUTPATIENT
Start: 2025-02-10 | End: 2025-02-16 | Stop reason: HOSPADM

## 2025-02-10 RX ORDER — DEXTROSE 50 % IN WATER (D50W) INTRAVENOUS SYRINGE
25
Status: DISCONTINUED | OUTPATIENT
Start: 2025-02-10 | End: 2025-02-16 | Stop reason: HOSPADM

## 2025-02-10 RX ORDER — CEPHALEXIN 500 MG/1
500 CAPSULE ORAL ONCE
Status: COMPLETED | OUTPATIENT
Start: 2025-02-10 | End: 2025-02-10

## 2025-02-10 RX ORDER — METRONIDAZOLE 500 MG/1
500 TABLET ORAL ONCE
Status: COMPLETED | OUTPATIENT
Start: 2025-02-10 | End: 2025-02-10

## 2025-02-10 RX ORDER — BISACODYL 10 MG/1
10 SUPPOSITORY RECTAL DAILY PRN
Status: DISCONTINUED | OUTPATIENT
Start: 2025-02-10 | End: 2025-02-16 | Stop reason: HOSPADM

## 2025-02-10 RX ORDER — TAMSULOSIN HYDROCHLORIDE 0.4 MG/1
0.4 CAPSULE ORAL NIGHTLY
COMMUNITY

## 2025-02-10 RX ORDER — VANCOMYCIN HYDROCHLORIDE 125 MG/1
125 CAPSULE ORAL DAILY
Status: DISCONTINUED | OUTPATIENT
Start: 2025-02-10 | End: 2025-02-12

## 2025-02-10 RX ORDER — ENOXAPARIN SODIUM 100 MG/ML
1 INJECTION SUBCUTANEOUS EVERY 12 HOURS
Status: DISCONTINUED | OUTPATIENT
Start: 2025-02-10 | End: 2025-02-12

## 2025-02-10 RX ORDER — ALUMINUM HYDROXIDE, MAGNESIUM HYDROXIDE, AND SIMETHICONE 1200; 120; 1200 MG/30ML; MG/30ML; MG/30ML
30 SUSPENSION ORAL EVERY 4 HOURS PRN
Status: DISCONTINUED | OUTPATIENT
Start: 2025-02-10 | End: 2025-02-16 | Stop reason: HOSPADM

## 2025-02-10 RX ORDER — CYCLOBENZAPRINE HCL 5 MG
5 TABLET ORAL EVERY 8 HOURS PRN
Status: DISCONTINUED | OUTPATIENT
Start: 2025-02-10 | End: 2025-02-16 | Stop reason: HOSPADM

## 2025-02-10 RX ORDER — ADHESIVE BANDAGE
30 BANDAGE TOPICAL DAILY PRN
COMMUNITY

## 2025-02-10 RX ORDER — ACETAMINOPHEN 500 MG
1000 TABLET ORAL EVERY 4 HOURS PRN
COMMUNITY

## 2025-02-10 RX ORDER — ROPINIROLE 0.5 MG/1
0.5 TABLET, FILM COATED ORAL NIGHTLY
Status: DISCONTINUED | OUTPATIENT
Start: 2025-02-10 | End: 2025-02-16 | Stop reason: HOSPADM

## 2025-02-10 RX ORDER — CELECOXIB 200 MG/1
200 CAPSULE ORAL DAILY
Status: DISCONTINUED | OUTPATIENT
Start: 2025-02-10 | End: 2025-02-16 | Stop reason: HOSPADM

## 2025-02-10 RX ORDER — ENOXAPARIN SODIUM 100 MG/ML
1 INJECTION SUBCUTANEOUS ONCE
Status: COMPLETED | OUTPATIENT
Start: 2025-02-10 | End: 2025-02-10

## 2025-02-10 RX ORDER — FENTANYL CITRATE 50 UG/ML
50 INJECTION, SOLUTION INTRAMUSCULAR; INTRAVENOUS ONCE
Status: COMPLETED | OUTPATIENT
Start: 2025-02-10 | End: 2025-02-10

## 2025-02-10 RX ORDER — METRONIDAZOLE 500 MG/1
500 TABLET ORAL 3 TIMES DAILY
Qty: 30 TABLET | Refills: 0 | Status: SHIPPED | OUTPATIENT
Start: 2025-02-10 | End: 2025-02-10 | Stop reason: WASHOUT

## 2025-02-10 RX ORDER — BISACODYL 10 MG/1
10 SUPPOSITORY RECTAL DAILY PRN
COMMUNITY

## 2025-02-10 RX ORDER — CEPHALEXIN 500 MG/1
500 CAPSULE ORAL 2 TIMES DAILY
Qty: 14 CAPSULE | Refills: 0 | Status: SHIPPED | OUTPATIENT
Start: 2025-02-10 | End: 2025-02-10 | Stop reason: WASHOUT

## 2025-02-10 RX ORDER — LIDOCAINE 560 MG/1
2 PATCH PERCUTANEOUS; TOPICAL; TRANSDERMAL DAILY
Status: DISCONTINUED | OUTPATIENT
Start: 2025-02-10 | End: 2025-02-16 | Stop reason: HOSPADM

## 2025-02-10 RX ORDER — ROPINIROLE 0.5 MG/1
0.5 TABLET, FILM COATED ORAL NIGHTLY
COMMUNITY

## 2025-02-10 RX ORDER — INSULIN LISPRO 100 [IU]/ML
0-5 INJECTION, SOLUTION INTRAVENOUS; SUBCUTANEOUS
Status: DISCONTINUED | OUTPATIENT
Start: 2025-02-10 | End: 2025-02-16 | Stop reason: HOSPADM

## 2025-02-10 RX ORDER — GABAPENTIN 400 MG/1
800 CAPSULE ORAL 3 TIMES DAILY
Status: DISCONTINUED | OUTPATIENT
Start: 2025-02-10 | End: 2025-02-16 | Stop reason: HOSPADM

## 2025-02-10 RX ORDER — CEFTRIAXONE 2 G/50ML
2 INJECTION, SOLUTION INTRAVENOUS EVERY 24 HOURS
Status: DISCONTINUED | OUTPATIENT
Start: 2025-02-10 | End: 2025-02-10

## 2025-02-10 RX ORDER — TAMSULOSIN HYDROCHLORIDE 0.4 MG/1
0.4 CAPSULE ORAL NIGHTLY
Status: DISCONTINUED | OUTPATIENT
Start: 2025-02-10 | End: 2025-02-16 | Stop reason: HOSPADM

## 2025-02-10 RX ORDER — ALUMINUM HYDROXIDE, MAGNESIUM HYDROXIDE, AND SIMETHICONE 1200; 120; 1200 MG/30ML; MG/30ML; MG/30ML
30 SUSPENSION ORAL EVERY 6 HOURS PRN
Status: DISCONTINUED | OUTPATIENT
Start: 2025-02-10 | End: 2025-02-16 | Stop reason: HOSPADM

## 2025-02-10 RX ORDER — LISINOPRIL 2.5 MG/1
2.5 TABLET ORAL DAILY
Status: DISCONTINUED | OUTPATIENT
Start: 2025-02-11 | End: 2025-02-16 | Stop reason: HOSPADM

## 2025-02-10 RX ORDER — SODIUM CHLORIDE, SODIUM LACTATE, POTASSIUM CHLORIDE, CALCIUM CHLORIDE 600; 310; 30; 20 MG/100ML; MG/100ML; MG/100ML; MG/100ML
75 INJECTION, SOLUTION INTRAVENOUS CONTINUOUS
Status: DISCONTINUED | OUTPATIENT
Start: 2025-02-10 | End: 2025-02-10

## 2025-02-10 RX ORDER — VANCOMYCIN HYDROCHLORIDE 125 MG/1
125 CAPSULE ORAL DAILY
COMMUNITY
Start: 2025-01-10 | End: 2025-02-16 | Stop reason: HOSPADM

## 2025-02-10 RX ORDER — POLYETHYLENE GLYCOL 3350 17 G/17G
17 POWDER, FOR SOLUTION ORAL DAILY PRN
Status: DISCONTINUED | OUTPATIENT
Start: 2025-02-10 | End: 2025-02-16 | Stop reason: HOSPADM

## 2025-02-10 RX ORDER — HEPARIN SODIUM 10000 [USP'U]/100ML
0-4500 INJECTION, SOLUTION INTRAVENOUS CONTINUOUS
Status: DISCONTINUED | OUTPATIENT
Start: 2025-02-10 | End: 2025-02-10

## 2025-02-10 RX ORDER — CYCLOBENZAPRINE HCL 5 MG
5 TABLET ORAL EVERY 8 HOURS PRN
COMMUNITY

## 2025-02-10 RX ORDER — CELECOXIB 200 MG/1
200 CAPSULE ORAL DAILY
COMMUNITY

## 2025-02-10 RX ADMIN — METRONIDAZOLE 500 MG: 5 INJECTION, SOLUTION INTRAVENOUS at 21:29

## 2025-02-10 RX ADMIN — CEFTRIAXONE 2 G: 2 INJECTION, POWDER, FOR SOLUTION INTRAMUSCULAR; INTRAVENOUS at 18:03

## 2025-02-10 RX ADMIN — IOHEXOL 75 ML: 350 INJECTION, SOLUTION INTRAVENOUS at 03:20

## 2025-02-10 RX ADMIN — VANCOMYCIN HYDROCHLORIDE 125 MG: 125 CAPSULE ORAL at 14:52

## 2025-02-10 RX ADMIN — GABAPENTIN 800 MG: 400 CAPSULE ORAL at 14:52

## 2025-02-10 RX ADMIN — LIDOCAINE 4% 2 PATCH: 40 PATCH TOPICAL at 14:52

## 2025-02-10 RX ADMIN — SODIUM CHLORIDE 1000 ML: 9 INJECTION, SOLUTION INTRAVENOUS at 07:16

## 2025-02-10 RX ADMIN — CEPHALEXIN 500 MG: 500 CAPSULE ORAL at 11:43

## 2025-02-10 RX ADMIN — SODIUM CHLORIDE 1000 ML: 9 INJECTION, SOLUTION INTRAVENOUS at 02:55

## 2025-02-10 RX ADMIN — ENOXAPARIN SODIUM 60 MG: 60 INJECTION SUBCUTANEOUS at 21:29

## 2025-02-10 RX ADMIN — FENTANYL CITRATE 50 MCG: 50 INJECTION, SOLUTION INTRAMUSCULAR; INTRAVENOUS at 02:55

## 2025-02-10 RX ADMIN — METRONIDAZOLE 500 MG: 500 TABLET ORAL at 11:43

## 2025-02-10 RX ADMIN — GABAPENTIN 800 MG: 400 CAPSULE ORAL at 21:29

## 2025-02-10 RX ADMIN — CYCLOBENZAPRINE HYDROCHLORIDE 5 MG: 5 TABLET, FILM COATED ORAL at 14:55

## 2025-02-10 RX ADMIN — ACETAMINOPHEN 650 MG: 325 TABLET ORAL at 21:30

## 2025-02-10 RX ADMIN — ENOXAPARIN SODIUM 60 MG: 60 INJECTION SUBCUTANEOUS at 10:56

## 2025-02-10 RX ADMIN — FENTANYL CITRATE 50 MCG: 50 INJECTION, SOLUTION INTRAMUSCULAR; INTRAVENOUS at 04:48

## 2025-02-10 RX ADMIN — SITAGLIPTIN 100 MG: 100 TABLET, FILM COATED ORAL at 14:52

## 2025-02-10 RX ADMIN — ROPINIROLE 0.5 MG: 0.5 TABLET, FILM COATED ORAL at 21:28

## 2025-02-10 RX ADMIN — TAMSULOSIN HYDROCHLORIDE 0.4 MG: 0.4 CAPSULE ORAL at 21:29

## 2025-02-10 RX ADMIN — SODIUM CHLORIDE, POTASSIUM CHLORIDE, SODIUM LACTATE AND CALCIUM CHLORIDE 500 ML: 600; 310; 30; 20 INJECTION, SOLUTION INTRAVENOUS at 15:41

## 2025-02-10 RX ADMIN — IOHEXOL 50 ML: 350 INJECTION, SOLUTION INTRAVENOUS at 09:55

## 2025-02-10 SDOH — SOCIAL STABILITY: SOCIAL INSECURITY: WITHIN THE LAST YEAR, HAVE YOU BEEN AFRAID OF YOUR PARTNER OR EX-PARTNER?: NO

## 2025-02-10 SDOH — SOCIAL STABILITY: SOCIAL INSECURITY: DO YOU FEEL UNSAFE GOING BACK TO THE PLACE WHERE YOU ARE LIVING?: NO

## 2025-02-10 SDOH — SOCIAL STABILITY: SOCIAL INSECURITY: WITHIN THE LAST YEAR, HAVE YOU BEEN HUMILIATED OR EMOTIONALLY ABUSED IN OTHER WAYS BY YOUR PARTNER OR EX-PARTNER?: NO

## 2025-02-10 SDOH — SOCIAL STABILITY: SOCIAL INSECURITY: WERE YOU ABLE TO COMPLETE ALL THE BEHAVIORAL HEALTH SCREENINGS?: YES

## 2025-02-10 SDOH — ECONOMIC STABILITY: INCOME INSECURITY: IN THE PAST 12 MONTHS HAS THE ELECTRIC, GAS, OIL, OR WATER COMPANY THREATENED TO SHUT OFF SERVICES IN YOUR HOME?: NO

## 2025-02-10 SDOH — SOCIAL STABILITY: SOCIAL INSECURITY: DO YOU FEEL ANYONE HAS EXPLOITED OR TAKEN ADVANTAGE OF YOU FINANCIALLY OR OF YOUR PERSONAL PROPERTY?: NO

## 2025-02-10 SDOH — ECONOMIC STABILITY: FOOD INSECURITY: WITHIN THE PAST 12 MONTHS, THE FOOD YOU BOUGHT JUST DIDN'T LAST AND YOU DIDN'T HAVE MONEY TO GET MORE.: NEVER TRUE

## 2025-02-10 SDOH — SOCIAL STABILITY: SOCIAL INSECURITY: ARE THERE ANY APPARENT SIGNS OF INJURIES/BEHAVIORS THAT COULD BE RELATED TO ABUSE/NEGLECT?: NO

## 2025-02-10 SDOH — SOCIAL STABILITY: SOCIAL INSECURITY: ABUSE: ADULT

## 2025-02-10 SDOH — SOCIAL STABILITY: SOCIAL INSECURITY: HAS ANYONE EVER THREATENED TO HURT YOUR FAMILY OR YOUR PETS?: NO

## 2025-02-10 SDOH — SOCIAL STABILITY: SOCIAL INSECURITY: HAVE YOU HAD ANY THOUGHTS OF HARMING ANYONE ELSE?: NO

## 2025-02-10 SDOH — SOCIAL STABILITY: SOCIAL INSECURITY: ARE YOU OR HAVE YOU BEEN THREATENED OR ABUSED PHYSICALLY, EMOTIONALLY, OR SEXUALLY BY ANYONE?: NO

## 2025-02-10 SDOH — SOCIAL STABILITY: SOCIAL INSECURITY: HAVE YOU HAD THOUGHTS OF HARMING ANYONE ELSE?: NO

## 2025-02-10 SDOH — ECONOMIC STABILITY: FOOD INSECURITY: WITHIN THE PAST 12 MONTHS, YOU WORRIED THAT YOUR FOOD WOULD RUN OUT BEFORE YOU GOT THE MONEY TO BUY MORE.: NEVER TRUE

## 2025-02-10 SDOH — SOCIAL STABILITY: SOCIAL INSECURITY: DOES ANYONE TRY TO KEEP YOU FROM HAVING/CONTACTING OTHER FRIENDS OR DOING THINGS OUTSIDE YOUR HOME?: NO

## 2025-02-10 ASSESSMENT — COGNITIVE AND FUNCTIONAL STATUS - GENERAL
TOILETING: A LOT
TURNING FROM BACK TO SIDE WHILE IN FLAT BAD: A LOT
DRESSING REGULAR UPPER BODY CLOTHING: A LITTLE
STANDING UP FROM CHAIR USING ARMS: A LOT
MOVING TO AND FROM BED TO CHAIR: A LOT
EATING MEALS: A LITTLE
PATIENT BASELINE BEDBOUND: YES
DRESSING REGULAR LOWER BODY CLOTHING: A LOT
HELP NEEDED FOR BATHING: A LOT
MOBILITY SCORE: 12
MOBILITY SCORE: 12
MOVING FROM LYING ON BACK TO SITTING ON SIDE OF FLAT BED WITH BEDRAILS: A LITTLE
WALKING IN HOSPITAL ROOM: A LOT
PERSONAL GROOMING: A LITTLE
DAILY ACTIVITIY SCORE: 15
DAILY ACTIVITIY SCORE: 14
DRESSING REGULAR LOWER BODY CLOTHING: A LOT
MOVING FROM LYING ON BACK TO SITTING ON SIDE OF FLAT BED WITH BEDRAILS: A LITTLE
STANDING UP FROM CHAIR USING ARMS: A LOT
HELP NEEDED FOR BATHING: A LOT
CLIMB 3 TO 5 STEPS WITH RAILING: TOTAL
PERSONAL GROOMING: A LITTLE
DRESSING REGULAR UPPER BODY CLOTHING: A LOT
WALKING IN HOSPITAL ROOM: A LOT
TURNING FROM BACK TO SIDE WHILE IN FLAT BAD: A LOT
TOILETING: A LOT
CLIMB 3 TO 5 STEPS WITH RAILING: TOTAL
EATING MEALS: A LITTLE
MOVING TO AND FROM BED TO CHAIR: A LOT

## 2025-02-10 ASSESSMENT — ENCOUNTER SYMPTOMS
CHEST TIGHTNESS: 0
ROS SKIN COMMENTS: BUTTOCKS
VOMITING: 0
COLOR CHANGE: 0
CONFUSION: 0
PALPITATIONS: 0
APPETITE CHANGE: 1
FEVER: 0
DIFFICULTY URINATING: 0
HEADACHES: 0
NUMBNESS: 0
NAUSEA: 0
AGITATION: 0
BLOOD IN STOOL: 0
SORE THROAT: 0
MYALGIAS: 1
NECK STIFFNESS: 0
SHORTNESS OF BREATH: 0
ABDOMINAL PAIN: 1
FREQUENCY: 0
TROUBLE SWALLOWING: 0
NECK PAIN: 0
DIARRHEA: 1
CONSTIPATION: 0
WEAKNESS: 1
DIZZINESS: 0
CHILLS: 0
WOUND: 1
BACK PAIN: 1

## 2025-02-10 ASSESSMENT — ACTIVITIES OF DAILY LIVING (ADL)
FEEDING YOURSELF: NEEDS ASSISTANCE
DRESSING YOURSELF: NEEDS ASSISTANCE
HEARING - LEFT EAR: FUNCTIONAL
PATIENT'S MEMORY ADEQUATE TO SAFELY COMPLETE DAILY ACTIVITIES?: YES
GROOMING: NEEDS ASSISTANCE
ASSISTIVE_DEVICE: DENTURES LOWER;DENTURES UPPER;EYEGLASSES
JUDGMENT_ADEQUATE_SAFELY_COMPLETE_DAILY_ACTIVITIES: YES
WALKS IN HOME: DEPENDENT
TOILETING: NEEDS ASSISTANCE
LACK_OF_TRANSPORTATION: NO
HEARING - RIGHT EAR: FUNCTIONAL
ADEQUATE_TO_COMPLETE_ADL: YES
BATHING: NEEDS ASSISTANCE

## 2025-02-10 ASSESSMENT — PATIENT HEALTH QUESTIONNAIRE - PHQ9
2. FEELING DOWN, DEPRESSED OR HOPELESS: NOT AT ALL
SUM OF ALL RESPONSES TO PHQ9 QUESTIONS 1 & 2: 0
1. LITTLE INTEREST OR PLEASURE IN DOING THINGS: NOT AT ALL

## 2025-02-10 ASSESSMENT — PAIN SCALES - GENERAL
PAINLEVEL_OUTOF10: 10 - WORST POSSIBLE PAIN
PAINLEVEL_OUTOF10: 4
PAINLEVEL_OUTOF10: 0 - NO PAIN
PAINLEVEL_OUTOF10: 9
PAINLEVEL_OUTOF10: 10 - WORST POSSIBLE PAIN

## 2025-02-10 ASSESSMENT — LIFESTYLE VARIABLES
HOW OFTEN DO YOU HAVE 6 OR MORE DRINKS ON ONE OCCASION: NEVER
EVER FELT BAD OR GUILTY ABOUT YOUR DRINKING: NO
AUDIT-C TOTAL SCORE: 0
HOW OFTEN DO YOU HAVE A DRINK CONTAINING ALCOHOL: NEVER
HAVE PEOPLE ANNOYED YOU BY CRITICIZING YOUR DRINKING: NO
SKIP TO QUESTIONS 9-10: 1
TOTAL SCORE: 0
EVER HAD A DRINK FIRST THING IN THE MORNING TO STEADY YOUR NERVES TO GET RID OF A HANGOVER: NO
HAVE YOU EVER FELT YOU SHOULD CUT DOWN ON YOUR DRINKING: NO
HOW MANY STANDARD DRINKS CONTAINING ALCOHOL DO YOU HAVE ON A TYPICAL DAY: PATIENT DOES NOT DRINK
AUDIT-C TOTAL SCORE: 0

## 2025-02-10 ASSESSMENT — PAIN - FUNCTIONAL ASSESSMENT
PAIN_FUNCTIONAL_ASSESSMENT: UNABLE TO SELF-REPORT
PAIN_FUNCTIONAL_ASSESSMENT: 0-10

## 2025-02-10 ASSESSMENT — COLUMBIA-SUICIDE SEVERITY RATING SCALE - C-SSRS
1. IN THE PAST MONTH, HAVE YOU WISHED YOU WERE DEAD OR WISHED YOU COULD GO TO SLEEP AND NOT WAKE UP?: NO
2. HAVE YOU ACTUALLY HAD ANY THOUGHTS OF KILLING YOURSELF?: NO
6. HAVE YOU EVER DONE ANYTHING, STARTED TO DO ANYTHING, OR PREPARED TO DO ANYTHING TO END YOUR LIFE?: NO

## 2025-02-10 ASSESSMENT — PAIN DESCRIPTION - LOCATION: LOCATION: ABDOMEN

## 2025-02-10 ASSESSMENT — PAIN DESCRIPTION - ORIENTATION: ORIENTATION: LEFT

## 2025-02-10 ASSESSMENT — PAIN DESCRIPTION - DESCRIPTORS: DESCRIPTORS: STABBING;SHARP

## 2025-02-10 NOTE — CONSULTS
Infectious Disease Consult    PATIENT NAME: Josias Ramirez    MRN: [id number]  SERVICE DATE:  2/10/2025   SERVICE TIME:  2:01 PM    SIGNATURE: Cora Griffith MD    PRIMARY CARE PHYSICIAN: VICTOR M Stephens-CNS  REASON FOR CONSULT: Colitis  REQUESTING PHYSICIAN:         ASSESSMENT :   -Acute descending and sigmoid colitis  -Presents with left-sided abdominal pain and diarrhea  -History of lung cancer, diabetes, PUD, chronic anemia    PLAN:  -Start ceftriaxone and Flagyl empirically  -Follow-up C. difficile PCR and if negative can discontinue p.o. vancomycin  -Closely monitor for antibiotics side effects including rash, Diarrhea/CDI, thrombocytopenia, LAVINIA, etc.      Will continue to follow.          HPI  74-year-old male with past medical history as listed below who I was asked to evaluate for colitis.  The patient presented with diarrhea and left-sided abdominal pain, CT abdomen pelvis showed descending and proximal sigmoid wall thickening consistent with colitis.  C. difficile PCR was ordered.  Started empirically on p.o. vancomycin.  Labs showed no leukocytosis.  ID team was consulted for antibiotics management    PAST MEDICAL HISTORY:   Past Medical History:   Diagnosis Date    Adenocarcinoma of left lung (Multi)     Cancer (Multi)     Cataracts, bilateral     Chronic anemia     Metastasis (Multi)     Mixed hyperlipidemia     Nonmelanoma skin cancer     Of face, head, right forearm    Parotid lymphadenopathy     Pedal edema     Peptic ulcer disease     Recurrent falls     Squamous cell carcinoma of nose     Tobacco use disorder     Type 2 diabetes mellitus     Vitamin B12 deficiency      PAST SURGICAL HISTORY:   Past Surgical History:   Procedure Laterality Date    OTHER SURGICAL HISTORY      Multiple Mohs procedures    PILONIDAL CYST DRAINAGE      SINUS SURGERY      SKIN BIOPSY       FAMILY HISTORY:   Family History   Problem Relation Name Age of Onset    Other (Diabetes mellitus) Mother      Other  (Diabetes mellitus) Sister      Other (Liver transplant) Brother       SOCIAL HISTORY:   Social History     Tobacco Use    Smoking status: Former     Current packs/day: 0.50     Types: Cigarettes    Smokeless tobacco: Never   Vaping Use    Vaping status: Never Used   Substance Use Topics    Alcohol use: Never    Drug use: Never     CURRENT ALLERGIES:   Allergies as of 02/10/2025 - Reviewed 02/10/2025   Allergen Reaction Noted    Penicillins Unknown 11/03/2024    Tramadol Itching 11/03/2024     MEDICATIONS:    Current Facility-Administered Medications:     acetaminophen (Tylenol) tablet 650 mg, 650 mg, oral, q4h PRN, Katie R Callahan, APRN-CNP    alum-mag hydroxide-simeth (Mylanta) 200-200-20 mg/5 mL oral suspension 30 mL, 30 mL, oral, q4h PRN, Katie R Callahan, APRN-CNP    bisacodyl (Dulcolax) suppository 10 mg, 10 mg, rectal, Daily PRN, Katie R Callahan, APRN-CNP    celecoxib (CeleBREX) capsule 200 mg, 200 mg, oral, Daily, Katie R Callahan, APRN-CNP    cyclobenzaprine (Flexeril) tablet 5 mg, 5 mg, oral, q8h PRN, Katie R Callahan, APRN-CNP    dextrose 50 % injection 12.5 g, 12.5 g, intravenous, q15 min PRN, Katie R Callahan, APRN-CNP    dextrose 50 % injection 25 g, 25 g, intravenous, q15 min PRN, Katie R Callahan, APRN-CNP    enoxaparin (Lovenox) syringe 60 mg, 1 mg/kg, subcutaneous, q12h, Katie R Callahan, APRN-CNP    gabapentin (Neurontin) capsule 800 mg, 800 mg, oral, TID, Katie R Callahan, APRN-CNP    glucagon (Glucagen) injection 1 mg, 1 mg, intramuscular, q15 min PRN, Katie R Callahan, APRN-CNP    glucagon (Glucagen) injection 1 mg, 1 mg, intramuscular, q15 min PRN, Katie R Callahan, APRN-CNP    insulin lispro injection 0-5 Units, 0-5 Units, subcutaneous, Before meals & nightly, Katie R Callahan, APRN-CNP    lidocaine 4 % patch 2 patch, 2 patch, transdermal, Daily, ARTEMIO Sahu    [START ON 2/11/2025] lisinopril tablet 2.5 mg, 2.5 mg, oral, Daily, Katie Callahan,  "APRN-CNP    magnesium hydroxide (Milk of Magnesia) 400 mg/5 mL suspension 30 mL, 30 mL, oral, Daily PRN, Katie R Callahan, APRN-CNP    polyethylene glycol (Glycolax, Miralax) packet 17 g, 17 g, oral, Daily PRN, Katie R Callahan, APRN-CNP    rOPINIRole (Requip) tablet 0.5 mg, 0.5 mg, oral, Nightly, Katie R Callahan, APRN-CNP    SITagliptin phosphate (Januvia) tablet 100 mg, 100 mg, oral, Daily, Katie R Callahan, APRN-CNP    sodium phosphates (Fleets) 19-7 gram/118 mL enema 1 enema, 1 enema, rectal, Daily PRN, Katie R Callahan, APRN-CNP    tamsulosin (Flomax) 24 hr capsule 0.4 mg, 0.4 mg, oral, Nightly, Katie R Callahan, APRN-CNP    vancomycin (Vancocin) capsule 125 mg, 125 mg, oral, Daily, Katie R Callahan, APRN-CNP       COMPLETE REVIEW OF SYSTEMS:    Review of systems shows no findings other than what is described in the narrative above.  Specifically, the patient has had no significant changes in eyesight, no sore throat, no post nasal drip, no ear pains.  There has been no cough or chest pains, pleuritic or otherwise.       PHYSICAL EXAM:  Patient Vitals for the past 24 hrs:   BP Temp Temp src Pulse Resp SpO2 Height Weight   02/10/25 1335 -- -- -- -- -- -- 1.803 m (5' 11\") 54.8 kg (120 lb 12.8 oz)   02/10/25 1300 93/51 -- -- 95 16 95 % -- --   02/10/25 1200 100/57 -- -- 98 16 97 % -- --   02/10/25 1130 116/61 -- -- 98 14 98 % -- --   02/10/25 1030 99/55 -- -- 80 14 100 % -- --   02/10/25 0930 101/59 -- -- 78 16 95 % -- --   02/10/25 0900 104/63 -- -- 78 14 96 % -- --   02/10/25 0800 106/55 -- -- 84 14 99 % -- --   02/10/25 0700 90/52 -- -- 80 12 100 % -- --   02/10/25 0640 94/54 -- -- 78 14 100 % -- --   02/10/25 0630 84/51 -- -- 78 12 100 % -- --   02/10/25 0600 91/50 -- -- 82 14 100 % -- --   02/10/25 0530 93/57 -- -- 86 13 100 % -- --   02/10/25 0500 82/50 -- -- 82 14 100 % -- --   02/10/25 0430 102/54 -- -- 92 (!) 21 100 % -- --   02/10/25 0400 100/57 -- -- 88 12 100 % -- --   02/10/25 0340 " "91/52 -- -- 88 15 100 % -- --   02/10/25 0300 90/50 -- -- 92 12 100 % -- --   02/10/25 0230 (!) 93/48 -- -- 94 11 98 % -- --   02/10/25 0215 88/54 -- -- 96 14 100 % -- --   02/10/25 0154 96/56 37.1 °C (98.8 °F) Temporal 94 16 99 % 1.803 m (5' 11\") 58.8 kg (129 lb 10.1 oz)     Body mass index is 16.85 kg/m².  Gen: No fever  Cardiovascular: RRR  GI: Abd soft, left-sided abdominal TTP  Extremities: no leg edema  Skin: No rash      Labs:  Lab Results   Component Value Date    WBC 7.3 02/10/2025    HGB 9.8 (L) 02/10/2025    HCT 32.3 (L) 02/10/2025    MCV 99 02/10/2025     02/10/2025     Lab Results   Component Value Date    GLUCOSE 99 02/10/2025    CALCIUM 7.6 (L) 02/10/2025     02/10/2025    K 4.4 02/10/2025    CO2 25 02/10/2025     02/10/2025    BUN 34 (H) 02/10/2025    CREATININE 1.21 02/10/2025   ESR: --No results found for: \"SEDRATE\"No results found for: \"CRP\"  Lab Results   Component Value Date    ALT 9 (L) 02/10/2025    AST 18 02/10/2025    ALKPHOS 73 02/10/2025    BILITOT 0.5 02/10/2025       DATA:   Diagnostic tests reviewed for today's visit:    Labs this admission reviewed  Imagings this admission reviewed  Cultures: Reviewed        Thank you so much for this consultation         Cora Griffith MD.   Infectious Disease Attending        This note was partially created using voice recognition software and is inherently subject to errors including those of syntax and \"sound-alike\" substitutions which may escape proofreading. In such instances, original meaning may be extrapolated by contextual derivation       "

## 2025-02-10 NOTE — CONSULTS
Wound Care Consult     Visit Date: 2/10/2025      Patient Name: Josias Ramirez         MRN: [id number]           YOB: 1950     Reason for Consult: Multiple wounds        Wound History: Present on admission     Pertinent Labs:   Albumin   Date Value Ref Range Status   02/10/2025 2.1 (L) 3.4 - 5.0 g/dL Final       Wound Assessment:  Wound 11/24/24 Other (comment) Nose (Active)       Wound 02/10/25 Pressure Injury Coccyx (Active)   Wound Image   02/10/25 1300   Pressure Injury Stage 2 02/10/25 0402       Wound 02/10/25 Pretibial Left;Proximal (Active)   Wound Length (cm) 1 cm 02/10/25 0539   Wound Width (cm) 1 cm 02/10/25 0539   Wound Surface Area (cm^2) 1 cm^2 02/10/25 0539   State of Healing Eschar 02/10/25 0539   Drainage Description None 02/10/25 0539   Drainage Amount None 02/10/25 0539   Dressing Status Removed 02/10/25 0539       Wound 02/10/25 Skin Tear Elbow Dorsal;Left (Active)   Wound Image   02/10/25 1303       Wound Team Summary Assessment: Pt has wound on bridge of nose secondary to CA. The pt states there is a hole there, covers with band aid. Pt states he needs to see his doctor. I removed band aid, noted thick, dry crust covering hole. Crust appears to be dries drainage, did not remove. Left elbow has small abrasion approx 0.5 x 0.5 x 0.1 cm, clean, moist, red. Coccyx has a PI that measures 2.5 x 1.5 cm and is covered with thin, adherent tissue, is at least a stage 3. Skin immediately surrounding wound is bright red, blanchable, extends approx 0.5 cm circumferentially. There is a dry, thick scab to mid shin, edges lifting. Area is stable.     Wound Team Plan: Cleanse all wounds with NS. Mepilex to nose and left elbow. Silver alginate (Exufiber or Aquacel Ag) to coccyx, cover with Mepilex. Change all every 3 days. Scab LLE NERY.     Chantell Ramírez RN  2/10/2025  3:56 PM

## 2025-02-10 NOTE — ED PROVIDER NOTES
HPI   Chief Complaint   Patient presents with   • Abdominal Pain     Patient complains of left sided abd pain and back pain.        This is a 74 years old male patient came to the emergency department by squad from nursing home with a chief complaint of abdominal pain.  Patient also reported diarrhea.  Denies bloody stool and black stool.  Patient is alert and oriented x 3, stated that he lost a lot of weight recently and he is currently bedridden and is having decubitus ulcer to the sacral region.  Denies any chest pain, shortness of breath, fever, chills, body aches, cough, weakness or focal numbness.    Review of system: As stated above in the HPI section.              Patient History   Past Medical History:   Diagnosis Date   • Adenocarcinoma of left lung (Multi)    • Cancer (Multi)    • Cataracts, bilateral    • Chronic anemia    • Metastasis (Multi)    • Mixed hyperlipidemia    • Nonmelanoma skin cancer     Of face, head, right forearm   • Parotid lymphadenopathy    • Pedal edema    • Peptic ulcer disease    • Recurrent falls    • Squamous cell carcinoma of nose    • Tobacco use disorder    • Type 2 diabetes mellitus    • Vitamin B12 deficiency      Past Surgical History:   Procedure Laterality Date   • OTHER SURGICAL HISTORY      Multiple Mohs procedures   • PILONIDAL CYST DRAINAGE     • SINUS SURGERY     • SKIN BIOPSY       Family History   Problem Relation Name Age of Onset   • Other (Diabetes mellitus) Mother     • Other (Diabetes mellitus) Sister     • Other (Liver transplant) Brother       Social History     Tobacco Use   • Smoking status: Every Day     Current packs/day: 0.50     Types: Cigarettes   • Smokeless tobacco: Never   Vaping Use   • Vaping status: Never Used   Substance Use Topics   • Alcohol use: Never   • Drug use: Never       Physical Exam   ED Triage Vitals [02/10/25 0154]   Temperature Heart Rate Respirations BP   37.1 °C (98.8 °F) 94 16 96/56      Pulse Ox Temp Source Heart Rate Source  Patient Position   99 % Temporal Monitor Lying      BP Location FiO2 (%)     Right arm --       Physical Exam  Vitals and nursing note reviewed.   Constitutional:       General: He is not in acute distress.     Appearance: He is well-developed. He is ill-appearing and toxic-appearing.      Comments: Appears cachectic   HENT:      Head: Normocephalic and atraumatic.   Eyes:      Conjunctiva/sclera: Conjunctivae normal.   Cardiovascular:      Rate and Rhythm: Normal rate and regular rhythm.      Heart sounds: No murmur heard.  Pulmonary:      Effort: Pulmonary effort is normal. No respiratory distress.      Breath sounds: Normal breath sounds.   Abdominal:      Palpations: Abdomen is soft.      Tenderness: There is abdominal tenderness in the right lower quadrant. There is guarding.   Musculoskeletal:         General: No swelling.      Cervical back: Neck supple.   Skin:     General: Skin is warm and dry.      Capillary Refill: Capillary refill takes less than 2 seconds.   Neurological:      Mental Status: He is alert.   Psychiatric:         Mood and Affect: Mood normal.           ED Course & MDM   ED Course as of 02/10/25 0627   Mon Feb 10, 2025   0446 Radiology reporting concern for common femoral vein thrombosis on CT scan although indeterminate. Recommending further evaluation with vascular ultrasound to further delineate.  [CL]      ED Course User Index  [CL] Fish Cavazos DO         Diagnoses as of 02/10/25 0627   Generalized abdominal pain                 No data recorded     John Coma Scale Score: 15 (02/10/25 0156 : Nikky Frausto, REBECCA)                           Medical Decision Making  Patient seen and examined, given the abdominal tenderness and recent weight loss as well as diarrhea we will proceed with CT abdomen pelvis with IV contrast dye.  Will administer IV fluids, and fentanyl for pain control.  Will obtain basic labs will obtain urinalysis    EKG revealed normal sinus rhythm, rate is 97 bpm,  normal LA, QRS, normal QTc duration.  No ST segment or T wave pathology.  Good R wave progression throughout the precordial leads    CT scan with a concern of left common femoral vein thrombosis.  Patient has intact dorsalis pedis pulse.  No tense compartments.  Lower extremities appears well-perfused.  Will obtain ultrasound to the left lower extremity.  Will signout the patient care at this point pending duplex venous ultrasound and disposition accordingly.        Procedure  Procedures     Solomon Lopes DO  02/10/25 0641

## 2025-02-10 NOTE — PROGRESS NOTES
Emergency Medicine Transition of Care Note.    I received Josias Ramirez in signout from Dr. Cavazos.  Please see the previous ED provider note for all HPI, PE and MDM up to the time of signout at 0645. This is in addition to the primary record.    In brief Josias Ramirez is an 74 y.o. male presenting for   Chief Complaint   Patient presents with    Abdominal Pain     Patient complains of left sided abd pain and back pain.      At the time of signout we were awaiting: CT abdomen and pelvis results    ED Course as of 02/10/25 1848   Mon Feb 10, 2025   0446 Radiology reporting concern for common femoral vein thrombosis on CT scan although indeterminate. Recommending further evaluation with vascular ultrasound to further delineate.  [CL]      ED Course User Index  [CL] Fish Cavazso,          Diagnoses as of 02/10/25 1848   Generalized abdominal pain   Acute deep vein thrombosis (DVT) of femoral vein of right lower extremity (Multi)   Colitis   Hypotension, unspecified hypotension type       Medical Decision Making  This is a 74-year-old male evaluated in the ED for diffuse abdominal pain.  Patient noted to have an acute left proximal femoral vein DVT however CT abdomen pelvis was pending read.  CT angio chest for PE ordered due to proximal vein DVT.  Patient reports feeling comfortable and denies any pain currently.  He is found to be hypotensive down to 86/51.  1 L fluids provided prior to my management with minimal improvement and repeat 1L of IVF provided.  BP did somewhat improve to 104/63.  CT angio chest for PE negative for PE however CT abdomen pelvis reveals enterocolitis.  Patient provided a dose of cephalexin and Flagyl due to penicillin allergy.  CT abdomen pelvis also noted concern for abdominal fluid overload.  On signout plan was to consider discharge with Eliquis however due to patient's hypotensive episodes and findings concerning for fluid overload, we advised for patient to remain in the hospital.  Patient  and family at bedside are agreeable.        Final diagnoses:   [R10.84] Generalized abdominal pain   [I82.411] Acute deep vein thrombosis (DVT) of femoral vein of right lower extremity (Multi)   [K52.9] Colitis   [I95.9] Hypotension, unspecified hypotension type           Procedure  Procedures    Ace Worrell MD, PGY 3  Emergency medicine

## 2025-02-10 NOTE — H&P
History Of Present Illness  Josias Ramirez is a 74 y.o. male from [facility] with past medical history of squamous cell carcinoma, right side parotid adenopathy, adenocarcinoma left lung with chemo/radiation, NIDDM, anemia, HLD, PUD, vitamin B 12 deficiency, pedal edema presenting with abdominal pain. Pt states diarrhea for months, new left sided abdominal pain which started today and worsening back pain. Pt cannot relate the pain to eating as he hasn't eaten since pain started, not sure if anything makes pain better or worse. Pt denies fever or chills, chest pain, shortness of breath, nausea, vomiting, melena/hematechezia, urinary urgency/frequency/burning. Tells me he is being treated with an oral medication for the diarrhea (I assume this is vancomycin which is on his med rec). States weight loss over the last few months, although he is unsure how much he's lost. He uses wheelchair, does not walk, has a wound on his sacrum which is stage II, no drainage or odor noted. Pt also c/o left leg pain; states he's had this for weeks, describes leg pain as heaviness. Pt found to have DVT in LLE and subsequently started on lovenox in ED.     Labs done today revealed sodium 136, potassium 5.6 (hemolyzed)/4.4, chloride 103, bicarb 25, anion gap 14, BUN 34, creat 1.21, albumin 2.1, lipase 4, troponin 6, WBC 7.3, hgb 9.5, hematocrit 32.3, platelets 207. Pt had CT chest which was negative for PE; revealed left pleural effusion and 14 mm spiculated nodule; needs follow up PET  scan. CT abdomen revealed chronic and acute colitis; impressive fluid overload.      Past Medical History  Past Medical History:   Diagnosis Date    Adenocarcinoma of left lung (Multi)     Cancer (Multi)     Cataracts, bilateral     Chronic anemia     Metastasis (Multi)     Mixed hyperlipidemia     Nonmelanoma skin cancer     Of face, head, right forearm    Parotid lymphadenopathy     Pedal edema     Peptic ulcer disease     Recurrent falls      Squamous cell carcinoma of nose     Tobacco use disorder     Type 2 diabetes mellitus     Vitamin B12 deficiency        Surgical History  Past Surgical History:   Procedure Laterality Date    OTHER SURGICAL HISTORY      Multiple Mohs procedures    PILONIDAL CYST DRAINAGE      SINUS SURGERY      SKIN BIOPSY          Social History  He reports that he has quit smoking. His smoking use included cigarettes. He has never used smokeless tobacco. He reports that he does not drink alcohol and does not use drugs.    Family History  Family History   Problem Relation Name Age of Onset    Other (Diabetes mellitus) Mother      Other (Diabetes mellitus) Sister      Other (Liver transplant) Brother          Allergies  Penicillins and Tramadol    Review of Systems   Constitutional:  Positive for appetite change. Negative for chills and fever.   HENT:  Negative for sore throat and trouble swallowing.    Eyes:  Negative for visual disturbance.   Respiratory:  Negative for chest tightness and shortness of breath.    Cardiovascular:  Negative for chest pain and palpitations.   Gastrointestinal:  Positive for abdominal pain and diarrhea. Negative for blood in stool, constipation, nausea and vomiting.   Genitourinary:  Negative for difficulty urinating, frequency and urgency.   Musculoskeletal:  Positive for back pain and myalgias. Negative for neck pain and neck stiffness.   Skin:  Positive for wound. Negative for color change, pallor and rash.        buttocks   Neurological:  Positive for weakness. Negative for dizziness, numbness and headaches.   Psychiatric/Behavioral:  Negative for agitation and confusion.         Physical Exam  Constitutional:       Appearance: He is ill-appearing.   HENT:      Head: Normocephalic.      Nose:      Comments: Open area, no drainage, no odor     Mouth/Throat:      Mouth: Mucous membranes are dry.   Eyes:      Extraocular Movements: Extraocular movements intact.   Cardiovascular:      Rate and Rhythm:  "Normal rate and regular rhythm.      Pulses: Normal pulses.      Heart sounds: Normal heart sounds.   Pulmonary:      Effort: Pulmonary effort is normal.      Breath sounds: Normal breath sounds.   Abdominal:      General: Bowel sounds are normal.      Palpations: Abdomen is soft.      Tenderness: There is abdominal tenderness. There is no guarding or rebound.      Comments: LLQ TTP   Musculoskeletal:         General: Tenderness present.      Cervical back: Normal range of motion.      Right lower leg: No edema.      Left lower leg: No edema.      Comments: LLE tenderness; decreased ROM    Skin:     General: Skin is warm and dry.      Capillary Refill: Capillary refill takes less than 2 seconds.   Neurological:      General: No focal deficit present.      Mental Status: He is alert and oriented to person, place, and time.   Psychiatric:         Mood and Affect: Mood normal.         Behavior: Behavior normal.          Last Recorded Vitals  Blood pressure 93/51, pulse 95, temperature 37.1 °C (98.8 °F), temperature source Temporal, resp. rate 16, height 1.803 m (5' 11\"), weight 54.8 kg (120 lb 12.8 oz), SpO2 95%.    Relevant Results  Scheduled medications  celecoxib, 200 mg, oral, Daily  enoxaparin, 1 mg/kg, subcutaneous, q12h  gabapentin, 800 mg, oral, TID  insulin lispro, 0-5 Units, subcutaneous, Before meals & nightly  lidocaine, 2 patch, transdermal, Daily  [START ON 2/11/2025] lisinopril, 2.5 mg, oral, Daily  rOPINIRole, 0.5 mg, oral, Nightly  SITagliptin phosphate, 100 mg, oral, Daily  tamsulosin, 0.4 mg, oral, Nightly  vancomycin, 125 mg, oral, Daily      Continuous medications     PRN medications  PRN medications: acetaminophen, alum-mag hydroxide-simeth, bisacodyl, cyclobenzaprine, dextrose, dextrose, glucagon, glucagon, magnesium hydroxide, polyethylene glycol, sodium phosphates   Results for orders placed or performed during the hospital encounter of 02/10/25 (from the past 24 hours)   CBC and Auto " Differential   Result Value Ref Range    WBC 7.3 4.4 - 11.3 x10*3/uL    nRBC 0.0 0.0 - 0.0 /100 WBCs    RBC 3.26 (L) 4.50 - 5.90 x10*6/uL    Hemoglobin 9.8 (L) 13.5 - 17.5 g/dL    Hematocrit 32.3 (L) 41.0 - 52.0 %    MCV 99 80 - 100 fL    MCH 30.1 26.0 - 34.0 pg    MCHC 30.3 (L) 32.0 - 36.0 g/dL    RDW 14.9 (H) 11.5 - 14.5 %    Platelets 207 150 - 450 x10*3/uL    Neutrophils % 80.7 40.0 - 80.0 %    Immature Granulocytes %, Automated 0.8 0.0 - 0.9 %    Lymphocytes % 11.1 13.0 - 44.0 %    Monocytes % 6.3 2.0 - 10.0 %    Eosinophils % 0.7 0.0 - 6.0 %    Basophils % 0.4 0.0 - 2.0 %    Neutrophils Absolute 5.92 (H) 1.60 - 5.50 x10*3/uL    Immature Granulocytes Absolute, Automated 0.06 0.00 - 0.50 x10*3/uL    Lymphocytes Absolute 0.81 0.80 - 3.00 x10*3/uL    Monocytes Absolute 0.46 0.05 - 0.80 x10*3/uL    Eosinophils Absolute 0.05 0.00 - 0.40 x10*3/uL    Basophils Absolute 0.03 0.00 - 0.10 x10*3/uL   Comprehensive metabolic panel   Result Value Ref Range    Glucose 99 74 - 99 mg/dL    Sodium 136 136 - 145 mmol/L    Potassium 5.6 (H) 3.5 - 5.3 mmol/L    Chloride 103 98 - 107 mmol/L    Bicarbonate 25 21 - 32 mmol/L    Anion Gap 14 10 - 20 mmol/L    Urea Nitrogen 34 (H) 6 - 23 mg/dL    Creatinine 1.21 0.50 - 1.30 mg/dL    eGFR 63 >60 mL/min/1.73m*2    Calcium 7.6 (L) 8.6 - 10.3 mg/dL    Albumin 2.1 (L) 3.4 - 5.0 g/dL    Alkaline Phosphatase 73 33 - 136 U/L    Total Protein 5.2 (L) 6.4 - 8.2 g/dL    AST 18 9 - 39 U/L    Bilirubin, Total 0.5 0.0 - 1.2 mg/dL    ALT 9 (L) 10 - 52 U/L   Lipase   Result Value Ref Range    Lipase 4 (L) 9 - 82 U/L   Troponin I, High Sensitivity   Result Value Ref Range    Troponin I, High Sensitivity 6 0 - 20 ng/L   Lavender Top   Result Value Ref Range    Extra Tube Hold for add-ons.    ECG 12 lead   Result Value Ref Range    Ventricular Rate 97 BPM    Atrial Rate 97 BPM    WY Interval 182 ms    QRS Duration 66 ms    QT Interval 336 ms    QTC Calculation(Bazett) 426 ms    P Axis 86 degrees    R  Axis -16 degrees    T Axis 69 degrees    QRS Count 16 beats    Q Onset 228 ms    P Onset 137 ms    P Offset 197 ms    T Offset 396 ms    QTC Fredericia 394 ms   Potassium   Result Value Ref Range    Potassium 4.4 3.5 - 5.3 mmol/L   Urinalysis with Reflex Culture and Microscopic   Result Value Ref Range    Color, Urine Yellow Light-Yellow, Yellow, Dark-Yellow    Appearance, Urine Clear Clear    Specific Gravity, Urine 1.027 1.005 - 1.035    pH, Urine 5.5 5.0, 5.5, 6.0, 6.5, 7.0, 7.5, 8.0    Protein, Urine 20 (TRACE) NEGATIVE, 10 (TRACE), 20 (TRACE) mg/dL    Glucose, Urine Normal Normal mg/dL    Blood, Urine 0.1 (1+) (A) NEGATIVE mg/dL    Ketones, Urine NEGATIVE NEGATIVE mg/dL    Bilirubin, Urine NEGATIVE NEGATIVE mg/dL    Urobilinogen, Urine Normal Normal mg/dL    Nitrite, Urine NEGATIVE NEGATIVE    Leukocyte Esterase, Urine NEGATIVE NEGATIVE   Urinalysis Microscopic   Result Value Ref Range    WBC, Urine 6-10 (A) 1-5, NONE /HPF    RBC, Urine 6-10 (A) NONE, 1-2, 3-5 /HPF    Mucus, Urine FEW Reference range not established. /LPF    Hyaline Casts, Urine OCCASIONAL (A) NONE /LPF   Lactate   Result Value Ref Range    Lactate 0.6 0.4 - 2.0 mmol/L    CT angio chest for pulmonary embolism    Result Date: 2/10/2025  Interpreted By:  Alex Campo, STUDY: CT ANGIO CHEST FOR PULMONARY EMBOLISM;  2/10/2025 10:11 am   INDICATION: Signs/Symptoms:abd pain.     COMPARISON: 11/14/2024   ACCESSION NUMBER(S): [id number]   ORDERING CLINICIAN: RONAL RIBEIRO   TECHNIQUE: Helical data acquisition of the chest was obtained with 50 mL Omnipaque 350. Images were reformatted in coronal and sagittal planes. Axial and coronal MIP images were created and reviewed.   FINDINGS: POTENTIAL LIMITATIONS OF THE STUDY: None   HEART AND VESSELS: No discrete filling defects within the main pulmonary artery or its branches.   Main pulmonary artery and its branches are normal in caliber.   The thoracic aorta is of normal course and caliber with vascular  calcifications.   Dense coronary artery calcifications are seen.The study is not optimized for evaluation of coronary arteries.   The cardiac chambers are not enlarged.   No evidence of pericardial effusion.   MEDIASTINUM AND CUBA, LOWER NECK AND AXILLA: The visualized thyroid gland is within normal limits.   No evidence of thoracic lymphadenopathy by CT criteria.   Esophagus appears within normal limits as seen.   LUNGS AND AIRWAYS: The trachea and central airways are patent. No endobronchial lesion.   New layering left effusion with partial consolidation of the left lower lobe. 14 mm spiculated nodule within the left lung apex not significantly changed from the recent prior. 4 mm nodule within the right lung apex on image 60 unchanged.   UPPER ABDOMEN: The visualized subdiaphragmatic structures demonstrate no remarkable findings.   CHEST WALL AND OSSEOUS STRUCTURES: There are no suspicious osseous lesions. Generalized anasarca.       1.  No pulmonary emboli. 2. New layering left effusion with compressive atelectatic change about the left lower lobe. 3. Unchanged 14 mm spiculated nodule within the left lung apex. Elective PET remains the recommendation for further evaluation.     MACRO: None   Signed by: Alex Campo 2/10/2025 10:39 AM Dictation workstation:   [id number]    CT abdomen pelvis w IV contrast    Result Date: 2/10/2025  Interpreted By:  Shahid Baird, STUDY: CT ABDOMEN PELVIS W IV CONTRAST;  2/10/2025 3:33 am   INDICATION: Signs/Symptoms:bilateral lower quadrant abdominal pain.     COMPARISON: CT abdomen and pelvis with contrast 14 November 2024   ACCESSION NUMBER(S): [id number]   ORDERING CLINICIAN: RONAL RIBEIRO   TECHNIQUE: CT of the abdomen and pelvis from the lung bases through the symphysis pubis after the uneventful administration of intravenous contrast (75 mL Omnipaque 350). No oral contrast.   FINDINGS: LOWER CHEST: New at least moderate size free-flowing left pleural effusion with adjacent  multisegmental left lower lobe collapse. Mucous plugging of a short segment of a left lower lobe bronchus   BONES: No acute skeletal findings.   LIVER: Normal. No enlargement or evidence of cirrhosis or fatty change. No mass or other suspect lesion.   SPLEEN: Normal. No enlargement, mass or evidence of splenic vein thrombosis.   PANCREAS: Normal. No CT evidence of acute or chronic pancreatitis. No duct dilation. No mass.   GALLBLADDER: Borderline dilated. CT cannot exclude stones. No wall thickening or adjacent fluid   BILE DUCTS: Normal. No biliary duct dilation.   ADRENAL GLANDS: Normal. No nodule or mass.   KIDNEYS AND URETERS: Normal.  No hydronephrosis on either side.  No mass.  Symmetric enhancement.  No infarct or CT evidence of acute pyelonephritis.  No substantial radiodense stone.  Tiny stones and radiolucent stones could be occult on CT.   LYMPH NODES: No adenopathy, intraperitoneal, retroperitoneal, pelvic or otherwise   APPENDIX: Normal.  Not dilated, thick walled or in any other way inflamed in appearance.  No inflammatory change about the appendix.   COLON: Wall thickening of and inner wall hyperenhancement in the left, sigmoid and rectum; featureless in the transverse colon; right colon unremarkable   SMALL BOWEL: Normal. No small bowel dilation or any other sign of small bowel obstruction. No sign of active inflammatory bowel disease.   STOMACH / DUODENUM: Grossly normal by CT which has limited sensitivity and specificity for the stomach and duodenum.   RETROPERITONEUM: Impressive edema throughout new from prior   OMENTUM, MESENTERY AND PERITONEAL SPACES: Free intraperitoneal air: Negative Free intraperitoneal fluid: Negative Abscess: Negative Other: n/a   URINARY BLADDER: Distended but not thick-walled. No stone or acute blood clot in the lumen   PELVIS: No obvious acute adnexal abnormality by CT   VASCULATURE: Densely calcified but normal caliber abdominal aorta and iliac arteries. Probability of  acute deep venous thrombosis of left iliofemoral vein as annotated with an arrow on axial 142   ABDOMINAL WALL: Hernia: Negative Other: Massive edema new from prior       There are findings of both chronic and acute colitis, with the acute element in the left, sigmoid and rectum whereas transverse colon does not appear acutely inflamed but featureless from chronic colitis   Impressive fluid overload. There is new edema throughout the retroperitoneum and subcutaneous fat and mesenteric edema all new from last CT November 20, 2024   Probability of acute deep venous thrombosis of left iliofemoral vein as annotated with an arrow in the groin on axial 142   No acute appendicitis, small bowel obstruction, perforation, abscess or free fluid   MACRO: None   Signed by: Shahid Baird 2/10/2025 9:48 AM Dictation workstation:   [id number]    ECG 12 lead    Result Date: 2/10/2025  Normal sinus rhythm Low voltage QRS Septal infarct , age undetermined Abnormal ECG When compared with ECG of 22-NOV-2024 14:32, Nonspecific T wave abnormality, worse in Lateral leads    Vascular US lower extremity venous duplex left    Result Date: 2/10/2025            [facility] - [place], [place] [address] [address] [id number]     Tel [phone] Fax [phone]  Vascular Lab Report  VASC US LOWER EXTREMITY VENOUS DUPLEX LEFT Patient Name:      JAMEY Crews Physician:  [id number] Peggy Mathew MD Study Date:        2/10/2025            Ordering Provider:  [id number] RONAL RIBEIRO MRN/PID:           [id number]             Fellow: Accession#:        [id number]         Technologist:       Ysabel Herndon RVT, MS Date of Birth/Age: 1950 / 74 years Technologist 2: Gender:            M                    Encounter#:         [id number] Admission Status:  Emergency            Location Performed: [facility]   Diagnosis/ICD: Pain in left leg-M79.605 Indication:    Left leg pain, concern for DVT on CT imaging. CPT Codes:     22779 Peripheral venous duplex scan for DVT Limited  **CRITICAL RESULT** Critical Result: Left EIV, CFV & proximal FV DVT. Notification called to Dr. Lopes & Dr. Worrell on 2/10/2025 at 8:42:24 AM. Acknowledged critical results notification communicated via secure chat by Ysabel Herndon RDMS, RVT.  CONCLUSIONS: Right Lower Venous: The right common femoral vein demonstrates normal spontaneous and respirophasic flow. Left Lower Venous: There is acute non-occlusive deep vein thrombosis visualized in the distal external iliac, common femoral and proximal femoral veins. The remainder of the left leg is negative for deep vein thrombosis. Additional Findings: There are lymph nodes in the left groin that measure 1.3 cm & 1.2 cm. 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 There is distal flow in the mid-distal femoral artery and the popliteal artery.  Imaging & Doppler Findings:  Right        Flow CFV   Spontaneous/Phasic  Left                  Compress      Thrombus              Flow Distal External Iliac    No    Acute non-occlusive Spontaneous/Phasic CFV                      No    Acute non-occlusive Spontaneous/Phasic FV Proximal              No    Acute non-occlusive Spontaneous/Phasic FV Mid                  Yes           None FV Distal               Yes           None Popliteal               Yes           None         Spontaneous/Phasic Peroneal                Yes           None PTV                     Yes           None  [id number] Peggy Mathew MD Electronically signed by [id number] Peggy Mathew MD on 2/10/2025 at 8:50:10 AM  ** Final **            Assessment/Plan   Assessment & Plan  Generalized abdominal pain    Type 2 diabetes mellitus    Nonmelanoma skin cancer    Mixed hyperlipidemia    DVT (deep venous thrombosis) (Multi)    Chronic anemia    Pt started on vancomycin at [facility]; will continue  until eval by ID  Stool pathogens/Stool CDIF ordered  ID consulted  Blood glucose monitoring with sliding scale coverage  Lovenox subcutaneous; consider Vascular consult  Monitor H/H  Monitor and replete electrolytes as appropriate    DVT prophylaxis  Continue lovenox    CODE STATUS  Pt is full code       I spent 60 minutes in the professional and overall care of this patient.      Katie Callahan, APRN-CNP

## 2025-02-10 NOTE — ED NOTES
Patient refusing straight cath at this time. Patient states he will tell me when he has to urinate. Patient states he is continent.     Nikky Frausto RN  02/10/25 [id number]

## 2025-02-10 NOTE — Clinical Note
Left throacentesis completed. Patient tolerated procedure well. 550 ml yellow fluid drained. Samples collected and sent to lab. Site dressed with Opsite dressing, no bleeding or hematoma noted. Report sent via secure chat to floor RN. Patient transport placed.

## 2025-02-10 NOTE — CONSULTS
Inpatient consult to Vascular Surgery  Consult performed by: ARTEMIO Campbell  Consult ordered by: ARTEMIO Sahu  Reason for consult: left femoral artery occlusion        History Of Present Illness:    Josias Ramirez is a 74 y.o. male presented to [facility] from [facility] w/acute abdominal pain, acute on chronic back pain, and chronic diarrhea. Endorses chronic LLE pain. Vascular US LE Venous Duplex Left 2/10/25  There is acute non-occlusive deep vein thrombosis visualized in the distal external iliac, common femoral and proximal femoral veins. The remainder of the left leg is negative for deep vein thrombosis. There are lymph nodes in the left groin that measure 1.3 cm & 1.2 cm. 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 There is distal flow in the mid-distal femoral artery and the popliteal artery. Started on Lovenox subcutaneous> Vascular Surgery consulted.     Patient evaluated in bed, unaccompanied. Endorses acute LLQ abdominal pain., acute on chronic back pain. Endorses chronic BLE pain, LLE >RLE. Unable to walk unassisted.     Past Medical History:  He has a past medical history of Adenocarcinoma of left lung (Multi), Cancer (Multi), Cataracts, bilateral, Chronic anemia, Metastasis (Multi), Mixed hyperlipidemia, Nonmelanoma skin cancer, Parotid lymphadenopathy, Pedal edema, Peptic ulcer disease, Recurrent falls, Squamous cell carcinoma of nose, Tobacco use disorder, Type 2 diabetes mellitus, and Vitamin B12 deficiency.    Past Surgical History:  He has a past surgical history that includes Sinus surgery; Pilonidal cyst drainage; Skin biopsy; and Other surgical history.      Social History:  He reports that he has quit smoking. His smoking use included cigarettes. He has never used smokeless tobacco. He reports that he does not drink alcohol and does not use drugs.    Family History:  Family History   Problem Relation Name Age of Onset    Other (Diabetes mellitus) Mother      Other  "(Diabetes mellitus) Sister      Other (Liver transplant) Brother          Last Recorded Vitals:  Vitals:    02/10/25 1130 02/10/25 1200 02/10/25 1300 02/10/25 1335   BP: 116/61 100/57 93/51    BP Location: Left arm  Left arm    Patient Position: Lying  Lying    Pulse: 98 98 95    Resp: 14 16 16    Temp:       TempSrc:       SpO2: 98% 97% 95%    Weight:    54.8 kg (120 lb 12.8 oz)   Height:    1.803 m (5' 11\")     Last I/O:  No intake/output data recorded.    Physical Exam  Vitals reviewed.   Constitutional:       Appearance: He is cachectic. He is ill-appearing.      Interventions: Nasal cannula in place.   HENT:      Head: Normocephalic.      Mouth/Throat:      Mouth: Mucous membranes are dry.   Eyes:      Conjunctiva/sclera: Conjunctivae normal.   Neck:      Vascular: No carotid bruit.   Cardiovascular:      Rate and Rhythm: Normal rate and regular rhythm.      Pulses:           Radial pulses are 2+ on the right side and 2+ on the left side.        Femoral pulses are 2+ on the right side and 2+ on the left side.       Popliteal pulses are 1+ on the right side and 1+ on the left side.        Dorsalis pedis pulses are detected w/ Doppler on the right side and detected w/ Doppler on the left side.        Posterior tibial pulses are detected w/ Doppler on the right side and detected w/ Doppler on the left side.   Pulmonary:      Effort: Pulmonary effort is normal.      Breath sounds: Normal breath sounds.   Musculoskeletal:      Right lower leg: No edema.      Left lower leg: No edema.   Feet:      Right foot:      Skin integrity: No skin breakdown.      Toenail Condition: Right toenails are abnormally thick.      Left foot:      Skin integrity: No skin breakdown.      Toenail Condition: Left toenails are abnormally thick.   Skin:     Coloration: Skin is pale.      Comments: Lesion to left proximal tibia w/black eschar.    Neurological:      Mental Status: He is oriented to person, place, and time.   Psychiatric:    "      Mood and Affect: Mood normal.       Last Labs:  CBC - 2/10/2025:  2:32 AM  7.3 9.8 207    32.3      CMP - 2/10/2025:  2:32 AM  7.6 5.2 18 --- 0.5   3.8 2.1 9 73      PTT - No results in last year.  1.1   12.5 _     Troponin I, High Sensitivity   Date/Time Value Ref Range Status   02/10/2025 02:32 AM 6 0 - 20 ng/L Final   11/19/2024 12:16 PM 12 0 - 20 ng/L Final   11/19/2024 10:18 AM 14 0 - 20 ng/L Final     BNP   Date/Time Value Ref Range Status   11/14/2024 09:40  (H) 0 - 99 pg/mL Final     Hemoglobin A1C   Date/Time Value Ref Range Status   04/26/2024 10:13 AM 9.3 (H) 4.0 - 5.6 % Final   03/03/2023 11:40 AM 8.9 (H) 4.0 - 5.6 % Final     Diagnostic Imaging:     VASC US LOWER EXTREMITY VENOUS DUPLEX LEFT     Patient Name:      JAMEY GOODWIN          Mars Physician:  [id number] Peggy Mathew MD  Study Date:        2/10/2025            Ordering Provider:  [id number] RONAL RIBEIRO  MRN/PID:           [id number]             Fellow:  Accession#:        [id number]         Technologist:       Ysabel Herndon RVT, RDMS  Date of Birth/Age: 1950 / 74 years Technologist 2:  Gender:            M                    Encounter#:         [id number]  Admission Status:  Emergency            Location Performed: [facility]        Diagnosis/ICD: Pain in left leg-M79.605  Indication:    Left leg pain, concern for DVT on CT imaging.  CPT Codes:     50934 Peripheral venous duplex scan for DVT Limited        **CRITICAL RESULT**  Critical Result: Left EIV, CFV & proximal FV DVT.  Notification called to Dr. Ribeiro & Dr. Worrell on 2/10/2025 at 8:42:24 AM. Acknowledged critical results notification communicated via secure chat by Ysabel Herndon RDMS, RVT.     CONCLUSIONS:  Right Lower Venous: The right common femoral vein demonstrates normal spontaneous and respirophasic flow.  Left Lower Venous: There  is acute non-occlusive deep vein thrombosis visualized in the distal external iliac, common femoral and proximal femoral veins. The remainder of the left leg is negative for deep vein thrombosis.  Additional Findings:  There are lymph nodes in the left groin that measure 1.3 cm & 1.2 cm.  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 There is distal flow  in the mid-distal femoral artery and the popliteal artery.        Imaging & Doppler Findings:     Right        Flow  CFV   Spontaneous/Phasic        Left                  Compress      Thrombus              Flow  Distal External Iliac    No    Acute non-occlusive Spontaneous/Phasic  CFV                      No    Acute non-occlusive Spontaneous/Phasic  FV Proximal              No    Acute non-occlusive Spontaneous/Phasic  FV Mid                  Yes           None  FV Distal               Yes           None  Popliteal               Yes           None         Spontaneous/Phasic  Peroneal                Yes           None  PTV                     Yes           None        [id number] Peggy Mathew MD  Electronically signed by [id number] Peggy Mathew MD on 2/10/2025 at 8:50:10 AM       Allergies:  Penicillins and Tramadol    Inpatient Medications:  Scheduled medications   Medication Dose Route Frequency    cefTRIAXone  2 g intravenous q24h    [Held by provider] celecoxib  200 mg oral Daily    enoxaparin  1 mg/kg subcutaneous q12h    gabapentin  800 mg oral TID    insulin lispro  0-5 Units subcutaneous Before meals & nightly    lactated Ringer's  500 mL intravenous Once    lidocaine  2 patch transdermal Daily    [START ON 2/11/2025] lisinopril  2.5 mg oral Daily    metroNIDAZOLE  500 mg intravenous q8h    rOPINIRole  0.5 mg oral Nightly    SITagliptin phosphate  100 mg oral Daily    tamsulosin  0.4 mg oral Nightly    vancomycin  125 mg oral Daily     PRN medications   Medication    acetaminophen    alum-mag hydroxide-simeth    alum-mag hydroxide-simeth    bisacodyl     cyclobenzaprine    dextrose    dextrose    glucagon    glucagon    lidocaine    magnesium hydroxide    polyethylene glycol    sodium phosphates     Continuous Medications   Medication Dose Last Rate     Outpatient Medications:  Current Outpatient Medications   Medication Instructions    acetaminophen (TYLENOL) 650 mg, oral, Every 4 hours PRN    acetaminophen (TYLENOL) 1,000 mg, oral, Every 4 hours PRN    alum-mag hydroxide-simeth (Mylanta) 200-200-20 mg/5 mL oral suspension 30 mL, oral, Every 4 hours PRN    bisacodyl (DULCOLAX) 10 mg, rectal, Daily PRN    celecoxib (CELEBREX) 200 mg, Daily    cyclobenzaprine (FLEXERIL) 5 mg, oral, Every 8 hours PRN    gabapentin (NEURONTIN) 800 mg, 3 times daily    lidocaine 4 % patch 2 patches, transdermal, Daily, Remove & discard patch within 12 hours or as directed by MD.    lidocaine-diphenhydrAMINE-Maalox 1:1:1 Magic Mouthwash 10 mL, Swish & Spit, Every 6 hours PRN    lisinopril 2.5 mg, Daily    magnesium hydroxide (Milk of Magnesia) 400 mg/5 mL suspension 30 mL, oral, Daily PRN    neomycin-bacitracnZn-polymyxnB (Neosporin Original) ointment Topical, Every 8 hours    nicotine (Nicoderm CQ) 14 mg/24 hr patch 1 patch, transdermal, Daily    rOPINIRole (REQUIP) 0.5 mg, Nightly    SITagliptin phosphate (JANUVIA) 100 mg, Daily    sodium phosphates (Fleets) 19-7 gram/118 mL enema enema 1 enema, rectal, Daily PRN    tamsulosin (FLOMAX) 0.4 mg, Nightly    vancomycin (VANCOCIN) 125 mg, Daily     Assessment/Plan   Left Femoral Artery Occlusion   Endorses acute LLQ abdominal pain., acute on chronic back pain. Endorses chronic BLE pain, LLE >RLE. Unable to walk unassisted.   Able to palpate bilateral femoral pulses 2+. Able to detect bilateral DP/PT w/doppler. Bilateral feet are cold to touch, toenails are overgrown.   Vascular US LE Venous Duplex Left 2/10/25 There is acute non-occlusive deep vein thrombosis visualized in the distal external iliac, common femoral and proximal femoral  veins. The remainder of the left leg is negative for deep vein thrombosis. There are lymph nodes in the left groin that measure 1.3 cm & 1.2 cm. 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 There is distal flow in the mid-distal femoral artery and the popliteal artery.  Order for TREVOR w/o exercise.     I spent 35 minutes in the professional and overall care of this patient.    Katie Vance, VICTOR M-CNP  Vascular Surgery  [facility]  [facility]

## 2025-02-10 NOTE — ED NOTES
Patient straight cathed for 300 ml. Urine sent to lab. Patient resting now but arousable.     Nikky Frausto RN  02/10/25 0586

## 2025-02-10 NOTE — CARE PLAN
The clinical goals for the shift include Pt will remain free from fall through the end of the shift  Problem: Pain - Adult  Goal: Verbalizes/displays adequate comfort level or baseline comfort level  Outcome: Progressing     Problem: Safety - Adult  Goal: Free from fall injury  Outcome: Progressing     Problem: Discharge Planning  Goal: Discharge to home or other facility with appropriate resources  Outcome: Progressing     Problem: Chronic Conditions and Co-morbidities  Goal: Patient's chronic conditions and co-morbidity symptoms are monitored and maintained or improved  Outcome: Progressing     Problem: Nutrition  Goal: Nutrient intake appropriate for maintaining nutritional needs  Outcome: Progressing     Problem: Skin  Goal: Decreased wound size/increased tissue granulation at next dressing change  Outcome: Progressing  Flowsheets (Taken 2/10/2025 1518)  Decreased wound size/increased tissue granulation at next dressing change:   Promote sleep for wound healing   Protective dressings over bony prominences  Goal: Participates in plan/prevention/treatment measures  Outcome: Progressing  Flowsheets (Taken 2/10/2025 1518)  Participates in plan/prevention/treatment measures: Elevate heels  Goal: Prevent/manage excess moisture  Outcome: Progressing  Flowsheets (Taken 2/10/2025 1518)  Prevent/manage excess moisture: Cleanse incontinence/protect with barrier cream  Goal: Prevent/minimize sheer/friction injuries  Outcome: Progressing  Flowsheets (Taken 2/10/2025 1518)  Prevent/minimize sheer/friction injuries: Use pull sheet  Goal: Promote/optimize nutrition  Outcome: Progressing  Goal: Promote skin healing  Outcome: Progressing

## 2025-02-11 ENCOUNTER — APPOINTMENT (OUTPATIENT)
Dept: RADIOLOGY | Facility: HOSPITAL | Age: 75
DRG: 299 | End: 2025-02-11
Payer: COMMERCIAL

## 2025-02-11 ENCOUNTER — APPOINTMENT (OUTPATIENT)
Dept: CARDIOLOGY | Facility: HOSPITAL | Age: 75
DRG: 299 | End: 2025-02-11
Payer: COMMERCIAL

## 2025-02-11 PROBLEM — J90 PLEURAL EFFUSION ON LEFT: Status: ACTIVE | Noted: 2025-02-11

## 2025-02-11 LAB
ANION GAP SERPL CALC-SCNC: 16 MMOL/L (ref 10–20)
BASOPHILS NFR FLD MANUAL: 0 %
BLASTS NFR FLD MANUAL: 0 %
BUN SERPL-MCNC: 33 MG/DL (ref 6–23)
C COLI+JEJ+UPSA DNA STL QL NAA+PROBE: NOT DETECTED
C DIF TOX TCDA+TCDB STL QL NAA+PROBE: NOT DETECTED
CALCIUM SERPL-MCNC: 7.3 MG/DL (ref 8.6–10.3)
CHLORIDE SERPL-SCNC: 106 MMOL/L (ref 98–107)
CLARITY FLD: CLEAR
CO2 SERPL-SCNC: 22 MMOL/L (ref 21–32)
COLOR FLD: YELLOW
CREAT SERPL-MCNC: 1.12 MG/DL (ref 0.5–1.3)
EC STX1 GENE STL QL NAA+PROBE: NOT DETECTED
EC STX2 GENE STL QL NAA+PROBE: NOT DETECTED
EGFRCR SERPLBLD CKD-EPI 2021: 69 ML/MIN/1.73M*2
EOSINOPHIL NFR FLD MANUAL: 0 %
ERYTHROCYTE [DISTWIDTH] IN BLOOD BY AUTOMATED COUNT: 14.8 % (ref 11.5–14.5)
GLUCOSE BLD MANUAL STRIP-MCNC: 115 MG/DL (ref 74–99)
GLUCOSE BLD MANUAL STRIP-MCNC: 117 MG/DL (ref 74–99)
GLUCOSE BLD MANUAL STRIP-MCNC: 118 MG/DL (ref 74–99)
GLUCOSE BLD MANUAL STRIP-MCNC: 130 MG/DL (ref 74–99)
GLUCOSE BLD MANUAL STRIP-MCNC: 159 MG/DL (ref 74–99)
GLUCOSE SERPL-MCNC: 121 MG/DL (ref 74–99)
HCT VFR BLD AUTO: 29.8 % (ref 41–52)
HGB BLD-MCNC: 9.1 G/DL (ref 13.5–17.5)
IMMATURE GRANULOCYTES IN FLUID: 0 %
LYMPHOCYTES NFR FLD MANUAL: 36 %
MAGNESIUM SERPL-MCNC: 2.12 MG/DL (ref 1.6–2.4)
MCH RBC QN AUTO: 30.3 PG (ref 26–34)
MCHC RBC AUTO-ENTMCNC: 30.5 G/DL (ref 32–36)
MCV RBC AUTO: 99 FL (ref 80–100)
MONOS+MACROS NFR FLD MANUAL: 35 %
NEUTROPHILS NFR FLD MANUAL: 29 %
NOROVIRUS GI + GII RNA STL NAA+PROBE: NOT DETECTED
NRBC BLD-RTO: 0 /100 WBCS (ref 0–0)
OTHER CELLS NFR FLD MANUAL: 0 %
PLASMA CELLS NFR FLD MANUAL: 0 %
PLATELET # BLD AUTO: 209 X10*3/UL (ref 150–450)
POTASSIUM SERPL-SCNC: 4.4 MMOL/L (ref 3.5–5.3)
RBC # BLD AUTO: 3 X10*6/UL (ref 4.5–5.9)
RBC # FLD AUTO: <1000 /UL
RV RNA STL NAA+PROBE: NOT DETECTED
SALMONELLA DNA STL QL NAA+PROBE: NOT DETECTED
SHIGELLA DNA SPEC QL NAA+PROBE: NOT DETECTED
SODIUM SERPL-SCNC: 140 MMOL/L (ref 136–145)
TOTAL CELLS COUNTED FLD: 100
V CHOLERAE DNA STL QL NAA+PROBE: NOT DETECTED
WBC # BLD AUTO: 6.1 X10*3/UL (ref 4.4–11.3)
WBC # FLD AUTO: 240 /UL
Y ENTEROCOL DNA STL QL NAA+PROBE: NOT DETECTED

## 2025-02-11 PROCEDURE — 2500000004 HC RX 250 GENERAL PHARMACY W/ HCPCS (ALT 636 FOR OP/ED): Performed by: INTERNAL MEDICINE

## 2025-02-11 PROCEDURE — 89051 BODY FLUID CELL COUNT: CPT | Performed by: NURSE PRACTITIONER

## 2025-02-11 PROCEDURE — 99223 1ST HOSP IP/OBS HIGH 75: CPT | Performed by: INTERNAL MEDICINE

## 2025-02-11 PROCEDURE — 84157 ASSAY OF PROTEIN OTHER: CPT | Mod: STJLAB | Performed by: NURSE PRACTITIONER

## 2025-02-11 PROCEDURE — 88112 CYTOPATH CELL ENHANCE TECH: CPT | Mod: TC | Performed by: NURSE PRACTITIONER

## 2025-02-11 PROCEDURE — 96372 THER/PROPH/DIAG INJ SC/IM: CPT | Performed by: NURSE PRACTITIONER

## 2025-02-11 PROCEDURE — 82947 ASSAY GLUCOSE BLOOD QUANT: CPT

## 2025-02-11 PROCEDURE — 99221 1ST HOSP IP/OBS SF/LOW 40: CPT | Performed by: INTERNAL MEDICINE

## 2025-02-11 PROCEDURE — 80048 BASIC METABOLIC PNL TOTAL CA: CPT | Performed by: NURSE PRACTITIONER

## 2025-02-11 PROCEDURE — 2500000001 HC RX 250 WO HCPCS SELF ADMINISTERED DRUGS (ALT 637 FOR MEDICARE OP): Performed by: NURSE PRACTITIONER

## 2025-02-11 PROCEDURE — 1200000002 HC GENERAL ROOM WITH TELEMETRY DAILY

## 2025-02-11 PROCEDURE — 83986 ASSAY PH BODY FLUID NOS: CPT | Mod: STJLAB | Performed by: NURSE PRACTITIONER

## 2025-02-11 PROCEDURE — 0W9B3ZZ DRAINAGE OF LEFT PLEURAL CAVITY, PERCUTANEOUS APPROACH: ICD-10-PCS | Performed by: NURSE PRACTITIONER

## 2025-02-11 PROCEDURE — 2500000002 HC RX 250 W HCPCS SELF ADMINISTERED DRUGS (ALT 637 FOR MEDICARE OP, ALT 636 FOR OP/ED): Performed by: NURSE PRACTITIONER

## 2025-02-11 PROCEDURE — 85027 COMPLETE CBC AUTOMATED: CPT | Performed by: NURSE PRACTITIONER

## 2025-02-11 PROCEDURE — 87070 CULTURE OTHR SPECIMN AEROBIC: CPT | Mod: STJLAB | Performed by: NURSE PRACTITIONER

## 2025-02-11 PROCEDURE — 2500000004 HC RX 250 GENERAL PHARMACY W/ HCPCS (ALT 636 FOR OP/ED): Performed by: NURSE PRACTITIONER

## 2025-02-11 PROCEDURE — 32555 ASPIRATE PLEURA W/ IMAGING: CPT | Mod: LT | Performed by: NURSE PRACTITIONER

## 2025-02-11 PROCEDURE — 36415 COLL VENOUS BLD VENIPUNCTURE: CPT | Performed by: NURSE PRACTITIONER

## 2025-02-11 PROCEDURE — 83615 LACTATE (LD) (LDH) ENZYME: CPT | Mod: STJLAB | Performed by: NURSE PRACTITIONER

## 2025-02-11 PROCEDURE — 2500000001 HC RX 250 WO HCPCS SELF ADMINISTERED DRUGS (ALT 637 FOR MEDICARE OP): Performed by: PHARMACIST

## 2025-02-11 PROCEDURE — 93922 UPR/L XTREMITY ART 2 LEVELS: CPT

## 2025-02-11 PROCEDURE — 2500000005 HC RX 250 GENERAL PHARMACY W/O HCPCS: Performed by: NURSE PRACTITIONER

## 2025-02-11 PROCEDURE — 82945 GLUCOSE OTHER FLUID: CPT | Mod: STJLAB | Performed by: NURSE PRACTITIONER

## 2025-02-11 PROCEDURE — 2720000007 HC OR 272 NO HCPCS

## 2025-02-11 PROCEDURE — 93922 UPR/L XTREMITY ART 2 LEVELS: CPT | Performed by: NURSE PRACTITIONER

## 2025-02-11 PROCEDURE — 83735 ASSAY OF MAGNESIUM: CPT | Performed by: NURSE PRACTITIONER

## 2025-02-11 RX ORDER — LIDOCAINE HYDROCHLORIDE 10 MG/ML
INJECTION, SOLUTION EPIDURAL; INFILTRATION; INTRACAUDAL; PERINEURAL
Status: COMPLETED | OUTPATIENT
Start: 2025-02-11 | End: 2025-02-11

## 2025-02-11 RX ORDER — METRONIDAZOLE 500 MG/1
500 TABLET ORAL EVERY 8 HOURS SCHEDULED
Status: DISCONTINUED | OUTPATIENT
Start: 2025-02-11 | End: 2025-02-16 | Stop reason: HOSPADM

## 2025-02-11 RX ADMIN — LIDOCAINE 4% 2 PATCH: 40 PATCH TOPICAL at 08:46

## 2025-02-11 RX ADMIN — ENOXAPARIN SODIUM 60 MG: 60 INJECTION SUBCUTANEOUS at 21:20

## 2025-02-11 RX ADMIN — SODIUM CHLORIDE 500 ML: 9 INJECTION, SOLUTION INTRAVENOUS at 08:43

## 2025-02-11 RX ADMIN — SITAGLIPTIN 100 MG: 100 TABLET, FILM COATED ORAL at 08:46

## 2025-02-11 RX ADMIN — LIDOCAINE HYDROCHLORIDE 8 ML: 10 INJECTION, SOLUTION EPIDURAL; INFILTRATION; INTRACAUDAL; PERINEURAL at 15:11

## 2025-02-11 RX ADMIN — GABAPENTIN 800 MG: 400 CAPSULE ORAL at 21:19

## 2025-02-11 RX ADMIN — GABAPENTIN 800 MG: 400 CAPSULE ORAL at 08:46

## 2025-02-11 RX ADMIN — CEFTRIAXONE 2 G: 2 INJECTION, POWDER, FOR SOLUTION INTRAMUSCULAR; INTRAVENOUS at 15:51

## 2025-02-11 RX ADMIN — METRONIDAZOLE 500 MG: 500 TABLET ORAL at 14:18

## 2025-02-11 RX ADMIN — GABAPENTIN 800 MG: 400 CAPSULE ORAL at 14:18

## 2025-02-11 RX ADMIN — METRONIDAZOLE 500 MG: 5 INJECTION, SOLUTION INTRAVENOUS at 04:30

## 2025-02-11 RX ADMIN — TAMSULOSIN HYDROCHLORIDE 0.4 MG: 0.4 CAPSULE ORAL at 21:19

## 2025-02-11 RX ADMIN — VANCOMYCIN HYDROCHLORIDE 125 MG: 125 CAPSULE ORAL at 08:46

## 2025-02-11 RX ADMIN — ROPINIROLE 0.5 MG: 0.5 TABLET, FILM COATED ORAL at 21:20

## 2025-02-11 RX ADMIN — ENOXAPARIN SODIUM 60 MG: 60 INJECTION SUBCUTANEOUS at 08:46

## 2025-02-11 RX ADMIN — METRONIDAZOLE 500 MG: 500 TABLET ORAL at 21:19

## 2025-02-11 ASSESSMENT — PAIN SCALES - GENERAL
PAINLEVEL_OUTOF10: 0 - NO PAIN
PAINLEVEL_OUTOF10: 9
PAINLEVEL_OUTOF10: 0 - NO PAIN

## 2025-02-11 ASSESSMENT — COGNITIVE AND FUNCTIONAL STATUS - GENERAL
WALKING IN HOSPITAL ROOM: A LOT
MOVING TO AND FROM BED TO CHAIR: A LOT
EATING MEALS: A LITTLE
MOVING TO AND FROM BED TO CHAIR: A LOT
MOBILITY SCORE: 12
MOBILITY SCORE: 13
TOILETING: A LOT
DRESSING REGULAR UPPER BODY CLOTHING: A LOT
DRESSING REGULAR LOWER BODY CLOTHING: A LITTLE
STANDING UP FROM CHAIR USING ARMS: A LOT
PERSONAL GROOMING: A LITTLE
PERSONAL GROOMING: A LITTLE
STANDING UP FROM CHAIR USING ARMS: A LOT
WALKING IN HOSPITAL ROOM: A LOT
MOVING FROM LYING ON BACK TO SITTING ON SIDE OF FLAT BED WITH BEDRAILS: A LITTLE
TOILETING: A LOT
DRESSING REGULAR UPPER BODY CLOTHING: A LOT
DRESSING REGULAR LOWER BODY CLOTHING: A LOT
HELP NEEDED FOR BATHING: A LOT
CLIMB 3 TO 5 STEPS WITH RAILING: A LOT
CLIMB 3 TO 5 STEPS WITH RAILING: TOTAL
MOVING FROM LYING ON BACK TO SITTING ON SIDE OF FLAT BED WITH BEDRAILS: A LITTLE
DAILY ACTIVITIY SCORE: 14
TURNING FROM BACK TO SIDE WHILE IN FLAT BAD: A LOT
DAILY ACTIVITIY SCORE: 15
HELP NEEDED FOR BATHING: A LOT
EATING MEALS: A LITTLE
TURNING FROM BACK TO SIDE WHILE IN FLAT BAD: A LOT

## 2025-02-11 ASSESSMENT — PAIN - FUNCTIONAL ASSESSMENT: PAIN_FUNCTIONAL_ASSESSMENT: 0-10

## 2025-02-11 ASSESSMENT — ENCOUNTER SYMPTOMS
CONSTIPATION: 0
WOUND: 1
ABDOMINAL PAIN: 0
FATIGUE: 1
CHILLS: 0
APPETITE CHANGE: 1
LIGHT-HEADEDNESS: 0
FEVER: 0
UNEXPECTED WEIGHT CHANGE: 1
VOMITING: 0
NAUSEA: 0
DIARRHEA: 1
BLOOD IN STOOL: 0
RESPIRATORY NEGATIVE: 1
PSYCHIATRIC NEGATIVE: 1
DIZZINESS: 0
CARDIOVASCULAR NEGATIVE: 1

## 2025-02-11 NOTE — H&P
Internal Medicine History and Physical    PATIENT NAME:  Josias Ramirez    MRN: [id number]  DATE of SERVICE: February 11, 2025    Primary Care Physician: VICTOR M Stephens-CNS          Chief Complaint: Abdominal pain    HPI:  This is a 74 y.o. 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    In the emergency room patient had CT scan of abdomen pelvis which showed retroperitoneal edema, results found to have acute DVT in the left lower extremity    Past Medical History:  Past Medical History:   Diagnosis Date    Adenocarcinoma of left lung (Multi)     Cancer (Multi)     Cataracts, bilateral     Chronic anemia     Metastasis (Multi)     Mixed hyperlipidemia     Nonmelanoma skin cancer     Of face, head, right forearm    Parotid lymphadenopathy     Pedal edema     Peptic ulcer disease     Recurrent falls     Squamous cell carcinoma of nose     Tobacco use disorder     Type 2 diabetes mellitus     Vitamin B12 deficiency        Past Surgical History:  Past Surgical History:   Procedure Laterality Date    OTHER SURGICAL HISTORY      Multiple Mohs procedures    PILONIDAL CYST DRAINAGE      SINUS SURGERY      SKIN BIOPSY         Family History:  Family History   Problem Relation Name Age of Onset    Other (Diabetes mellitus) Mother      Other (Diabetes mellitus) Sister      Other (Liver transplant) Brother         Social History:    Social History     Socioeconomic History    Marital status: Single     Spouse name: Not on file    Number of children: Not on file    Years of education: Not on file    Highest education level: Not on file   Occupational History    Not on file   Tobacco Use    Smoking status: Former     Current packs/day: 0.50     Types: Cigarettes    Smokeless tobacco: Never    Vaping Use    Vaping status: Never Used   Substance and Sexual Activity    Alcohol use: Never    Drug use: Never    Sexual activity: Defer   Other Topics Concern    Not on file   Social History Narrative    Not on file     Social Drivers of Health     Financial Resource Strain: Low Risk  (2/10/2025)    Overall Financial Resource Strain (CARDIA)     Difficulty of Paying Living Expenses: Not hard at all   Food Insecurity: No Food Insecurity (2/10/2025)    Hunger Vital Sign     Worried About Running Out of Food in the Last Year: Never true     Ran Out of Food in the Last Year: Never true   Transportation Needs: No Transportation Needs (2/10/2025)    PRAPARE - Transportation     Lack of Transportation (Medical): No     Lack of Transportation (Non-Medical): No   Physical Activity: Inactive (11/15/2024)    Exercise Vital Sign     Days of Exercise per Week: 0 days     Minutes of Exercise per Session: 0 min   Stress: No Stress Concern Present (11/22/2024)    [employer] - Occupational Stress Questionnaire     Feeling of Stress : Not at all   Social Connections: Feeling Socially Integrated (11/20/2024)    OASIS : Social Isolation     Frequency of experiencing loneliness or isolation: Never   Recent Concern: Social Connections - Moderately Isolated (11/15/2024)    Social Connection and Isolation Panel [NHANES]     Frequency of Communication with Friends and Family: More than three times a week     Frequency of Social Gatherings with Friends and Family: Three times a week     Attends [employer] Services: More than 4 times per year     Active Member of Clubs or Organizations: No     Attends Club or Organization Meetings: Never     Marital Status:    Intimate Partner Violence: Not At Risk (2/10/2025)    Humiliation, Afraid, Rape, and Kick questionnaire     Fear of Current or Ex-Partner: No     Emotionally Abused: No     Physically Abused: No     Sexually Abused: No   Housing Stability:  Low Risk  (2/10/2025)    Housing Stability Vital Sign     Unable to Pay for Housing in the Last Year: No     Number of Times Moved in the Last Year: 1     Homeless in the Last Year: No         Prior to Admission Medications:  Medications Prior to Admission   Medication Sig Dispense Refill Last Dose/Taking    celecoxib (CeleBREX) 200 mg capsule Take 1 capsule (200 mg) by mouth once daily.   2/9/2025 Morning    gabapentin (Neurontin) 800 mg tablet Take 1 tablet (800 mg) by mouth 3 times a day.   2/9/2025 Evening    lisinopril 2.5 mg tablet Take 1 tablet (2.5 mg) by mouth once daily.   2/9/2025 Morning    rOPINIRole (Requip) 0.5 mg tablet Take 1 tablet (0.5 mg) by mouth once daily at bedtime.   2/9/2025 Bedtime    SITagliptin phosphate (Januvia) 100 mg tablet Take 1 tablet (100 mg) by mouth once daily.   2/9/2025 Morning    tamsulosin (Flomax) 0.4 mg 24 hr capsule Take 1 capsule (0.4 mg) by mouth once daily at bedtime.   2/9/2025 Bedtime    vancomycin (Vancocin) 125 mg capsule Take 1 capsule (125 mg) by mouth once daily.   2/9/2025 at  9:00 AM    acetaminophen (Tylenol) 325 mg tablet Take 2 tablets (650 mg) by mouth every 4 hours if needed for mild pain (1 - 3), moderate pain (4 - 6) or headaches. (Patient not taking: Reported on 2/10/2025)   Not Taking    acetaminophen (Tylenol) 500 mg tablet Take 2 tablets (1,000 mg) by mouth every 4 hours if needed for mild pain (1 - 3).   Unknown    alum-mag hydroxide-simeth (Mylanta) 200-200-20 mg/5 mL oral suspension Take 30 mL by mouth every 4 hours if needed for indigestion or heartburn.   Unknown    bisacodyl (Dulcolax) 10 mg suppository Insert 1 suppository (10 mg) into the rectum once daily as needed for constipation.   Unknown    cyclobenzaprine (Flexeril) 5 mg tablet Take 1 tablet (5 mg) by mouth every 8 hours if needed for muscle spasms.   Unknown    lidocaine 4 % patch Place 2 patches over 12 hours on the skin once daily. Remove & discard patch within 12 hours or as  directed by MD. (Patient not taking: Reported on 2/10/2025) 30 patch 0 Not Taking    lidocaine-diphenhydrAMINE-Maalox 1:1:1 Magic Mouthwash Swish and spit 10 mL every 6 hours if needed (pain).   Unknown    magnesium hydroxide (Milk of Magnesia) 400 mg/5 mL suspension Take 30 mL by mouth once daily as needed for constipation.   Unknown    neomycin-bacitracnZn-polymyxnB (Neosporin Original) ointment Apply topically every 8 hours. (Patient not taking: Reported on 2/10/2025)   Not Taking    nicotine (Nicoderm CQ) 14 mg/24 hr patch Place 1 patch over 24 hours on the skin once daily. (Patient not taking: Reported on 2/10/2025) 28 patch 0 Not Taking    sodium phosphates (Fleets) 19-7 gram/118 mL enema enema Insert 133 mL (1 enema) into the rectum once daily as needed for constipation.   Unknown       Active orders:  Active Orders   Lab    Basic Metabolic Panel     Frequency: Daily     Number of Occurrences: Until Specified    CBC     Frequency: Daily     Number of Occurrences: Until Specified    Magnesium     Frequency: Daily     Number of Occurrences: Until Specified   Diet    Adult diet Consistent Carb; CCD 60 gm/meal     Frequency: Effective now     Number of Occurrences: Until Specified   Nursing    Activity (specify) Out of Bed with Assistance     Frequency: Until discontinued     Number of Occurrences: Until Specified    Change dressing     Frequency: Until discontinued     Number of Occurrences: Until Specified     Order Comments: Cleanse wound nose with NS, cover with Mepilex, Change every 3 days      Change dressing     Frequency: Until discontinued     Number of Occurrences: Until Specified     Order Comments: Cleanse wound left elbow with NS, cover with Mepilex, change every 3 days      Change dressing     Frequency: Until discontinued     Number of Occurrences: Until Specified     Order Comments: Cleanse coccyx wound with NS, apply Exufiber. Cover with Mepilex every 3 days and PRN for soilage.      Diet  instructions to nursing: 15 grams oral carbohydrate for low blood glucose     Frequency: Until discontinued     Number of Occurrences: Until Specified     Order Comments: If blood glucose less than 60 mg/dL (or less than 70 mg/dL and symptomatic), give 15 grams of carbohydrates: 4 ounces of juice or soda (not diet). Recheck blood glucose every 15 minutes and repeat 15 grams of carbohydrates until blood glucose is above 80 mg/dL.      Glucose 10-70 mg/dL & CONSCIOUS- Give 15 Grams of Carbohydrates and repeat until blood glucose level reaches 100 mg/dL or greater.     Frequency: Until discontinued     Number of Occurrences: Until Specified     Order Comments: Select 1 of the following:  -1 cup skim milk  -3 or 4 glucose tablets  -4 ounces of fruit juice or regular soda.  Patient MUST be CONSCIOUS and able to eat or drink      Notify provider (specify parameters)     Frequency: Until discontinued     Number of Occurrences: Until Specified    Notify provider (specify parameters)     Frequency: Until discontinued     Number of Occurrences: Until Specified     Order Comments: [place] Hypoglycemia interventions.      Notify provider (specify parameters)     Frequency: Until discontinued     Number of Occurrences: Until Specified     Order Comments: For blood glucose greater than 200 mg/dL twice over 24 hours.  Provider to consider Endocrine Consult.      Notify provider (specify parameters)     Frequency: Until discontinued     Number of Occurrences: Until Specified    Notify provider (specify parameters)     Frequency: Until discontinued     Number of Occurrences: Until Specified     Order Comments: [place] Hypoglycemia interventions.      Notify provider (specify parameters)     Frequency: Until discontinued     Number of Occurrences: Until Specified     Order Comments: For blood glucose greater than 200 mg/dL twice over 24 hours.  Provider to consider Endocrine Consult.      Notify provider (specify parameters)      Frequency: Until discontinued     Number of Occurrences: Until Specified    Pain Assessment     Frequency: Per unit standards     Number of Occurrences: Until Specified    Vital Signs     Frequency: q4h     Number of Occurrences: Until Specified   Code Status    Full code     Frequency: Continuous     Number of Occurrences: Until Specified   Consult    Inpatient consult to Dietitian     Frequency: Once     Number of Occurrences: 1 Occurrences    Inpatient consult to Social Work and TCC     Frequency: Once     Number of Occurrences: 1 Occurrences    Inpatient Consult to Wound and Ostomy Nurse     Frequency: Once     Number of Occurrences: 1 Occurrences   Nourishments    May Participate in Room Service     Frequency: Once     Number of Occurrences: 1 Occurrences   OT    OT eval and treat     Frequency: Until therapy completed     Number of Occurrences: 1 Occurrences   PT    PT eval and treat     Frequency: Until therapy completed     Number of Occurrences: 1 Occurrences   ECG    Electrocardiogram, 12-lead PRN ACS symptoms     Frequency: PRN     Number of Occurrences: Until Specified     Order Comments: Notify provider if performed.      Electrocardiogram, 12-lead PRN ACS symptoms     Frequency: PRN     Number of Occurrences: Until Specified     Order Comments: Notify provider if performed.     Point of Care Testing - Docked Device    POCT Glucose     Frequency: 4x daily - AC and at bedtime     Number of Occurrences: Until Specified     Order Comments: PRN for hypoglycemia interventions instituted.  Retest blood glucose level every 15 minutes after every hypoglycemic intervention until blood glucose is greater than 100 mg/dL.       Vascular Ultrasound    Vascular US Ankle Brachial Index (TREVOR) Without Exercise     Frequency: Once     Number of Occurrences: 1 Occurrences   Telemetry    Telemetry monitoring - Floor only     Frequency: Until discontinued     Number of Occurrences: 3 Days   Medications    acetaminophen  (Tylenol) tablet 650 mg     Frequency: q4h PRN     Dose: 650 mg     Route: oral    alum-mag hydroxide-simeth (Mylanta) 200-200-20 mg/5 mL oral suspension 30 mL     Frequency: q4h PRN     Dose: 30 mL     Route: oral    alum-mag hydroxide-simeth (Mylanta) 200-200-20 mg/5 mL oral suspension 30 mL     Frequency: q6h PRN     Dose: 30 mL     Route: oral    bisacodyl (Dulcolax) suppository 10 mg     Frequency: Daily PRN     Dose: 10 mg     Route: rectal    cefTRIAXone (Rocephin) 2 g in sodium chloride 0.9% IV 50 mL     Frequency: q24h     Dose: 2 g     Route: intravenous    celecoxib (CeleBREX) capsule 200 mg     Frequency: Daily     Dose: 200 mg     Route: oral    cyclobenzaprine (Flexeril) tablet 5 mg     Frequency: q8h PRN     Dose: 5 mg     Route: oral    dextrose 50 % injection 12.5 g     Frequency: q15 min PRN     Dose: 12.5 g     Route: intravenous    dextrose 50 % injection 25 g     Frequency: q15 min PRN     Dose: 25 g     Route: intravenous    enoxaparin (Lovenox) syringe 60 mg     Frequency: q12h     Dose: 1 mg/kg     Route: subcutaneous    gabapentin (Neurontin) capsule 800 mg     Frequency: TID     Dose: 800 mg     Route: oral    glucagon (Glucagen) injection 1 mg     Frequency: q15 min PRN     Dose: 1 mg     Route: intramuscular    glucagon (Glucagen) injection 1 mg     Frequency: q15 min PRN     Dose: 1 mg     Route: intramuscular    insulin lispro injection 0-5 Units     Frequency: Before meals & nightly     Dose: 0-5 Units     Route: subcutaneous    lidocaine (Xylocaine) 2 % mouth solution 15 mL     Frequency: q6h PRN     Dose: 15 mL     Route: Swish & Spit    lidocaine 4 % patch 2 patch     Frequency: Daily     Dose: 2 patch     Route: transdermal    lisinopril tablet 2.5 mg     Frequency: Daily     Dose: 2.5 mg     Route: oral    magnesium hydroxide (Milk of Magnesia) 400 mg/5 mL suspension 30 mL     Frequency: Daily PRN     Dose: 30 mL     Route: oral    metroNIDAZOLE (Flagyl) 500 mg in sodium chloride  "(iso)  mL     Frequency: q8h     Dose: 500 mg     Route: intravenous    polyethylene glycol (Glycolax, Miralax) packet 17 g     Frequency: Daily PRN     Dose: 17 g     Route: oral    rOPINIRole (Requip) tablet 0.5 mg     Frequency: Nightly     Dose: 0.5 mg     Route: oral    SITagliptin phosphate (Januvia) tablet 100 mg     Frequency: Daily     Dose: 100 mg     Route: oral    sodium chloride 0.9 % bolus 500 mL     Frequency: Once     Dose: 500 mL     Route: intravenous    sodium phosphates (Fleets) 19-7 gram/118 mL enema 1 enema     Frequency: Daily PRN     Dose: 1 enema     Route: rectal    tamsulosin (Flomax) 24 hr capsule 0.4 mg     Frequency: Nightly     Dose: 0.4 mg     Route: oral    vancomycin (Vancocin) capsule 125 mg     Frequency: Daily     Dose: 125 mg     Route: oral           Allergies:   Allergies   Allergen Reactions    Penicillins Rash      rash since childhood    Tramadol Itching       Review of Systems:  A 10 systems review of systems is negative except for HPI.      Vitals:  Patient Vitals for the past 24 hrs:   BP Temp Temp src Pulse Resp SpO2 Height Weight   02/11/25 0800 87/51 36.2 °C (97.2 °F) Temporal 93 17 98 % -- --   02/11/25 0000 113/52 36.4 °C (97.5 °F) Temporal 92 16 100 % -- --   02/10/25 2000 81/58 36.2 °C (97.2 °F) Temporal 92 16 100 % -- --   02/10/25 1740 96/50 -- -- -- -- -- -- --   02/10/25 1634 93/50 -- -- -- -- -- -- --   02/10/25 1545 83/56 36 °C (96.8 °F) Temporal 94 18 100 % -- --   02/10/25 1335 -- -- -- -- -- -- 1.803 m (5' 11\") 54.8 kg (120 lb 12.8 oz)   02/10/25 1300 93/51 -- -- 95 16 95 % -- --   02/10/25 1200 100/57 -- -- 98 16 97 % -- --   02/10/25 1130 116/61 -- -- 98 14 98 % -- --   02/10/25 1030 99/55 -- -- 80 14 100 % -- --   02/10/25 0930 101/59 -- -- 78 16 95 % -- --     Body mass index is 16.85 kg/m².         Physical Exam:  General appearance: Thin, cachectic in no respiratory distress  Skin: Skin color, texture, turgor normal  Head: normal  Eyes: " Anicteric sclera.  Extraocular movements are intact.   Ears: external ears normal  Oropharynx: Lips, mucosa, and tongue normal  Neck: Supple, no adenopathy  Lungs: Clear to auscultation, no wheezing or rhonchi  Heart: RRR without murmur, gallop, or rubs.  No ectopy  Abdomen: Soft, non-tender. Bowel sounds normal  Extremities: No deformity, no edema  Neuro: Alert oriented x3, no focal deficit.      Labs:  Results for orders placed or performed during the hospital encounter of 02/10/25 (from the past 24 hours)   POCT GLUCOSE   Result Value Ref Range    POCT Glucose 114 (H) 74 - 99 mg/dL   POCT GLUCOSE   Result Value Ref Range    POCT Glucose 93 74 - 99 mg/dL   POCT GLUCOSE   Result Value Ref Range    POCT Glucose 117 (H) 74 - 99 mg/dL   CBC   Result Value Ref Range    WBC 6.1 4.4 - 11.3 x10*3/uL    nRBC 0.0 0.0 - 0.0 /100 WBCs    RBC 3.00 (L) 4.50 - 5.90 x10*6/uL    Hemoglobin 9.1 (L) 13.5 - 17.5 g/dL    Hematocrit 29.8 (L) 41.0 - 52.0 %    MCV 99 80 - 100 fL    MCH 30.3 26.0 - 34.0 pg    MCHC 30.5 (L) 32.0 - 36.0 g/dL    RDW 14.8 (H) 11.5 - 14.5 %    Platelets 209 150 - 450 x10*3/uL   Basic Metabolic Panel   Result Value Ref Range    Glucose 121 (H) 74 - 99 mg/dL    Sodium 140 136 - 145 mmol/L    Potassium 4.4 3.5 - 5.3 mmol/L    Chloride 106 98 - 107 mmol/L    Bicarbonate 22 21 - 32 mmol/L    Anion Gap 16 10 - 20 mmol/L    Urea Nitrogen 33 (H) 6 - 23 mg/dL    Creatinine 1.12 0.50 - 1.30 mg/dL    eGFR 69 >60 mL/min/1.73m*2    Calcium 7.3 (L) 8.6 - 10.3 mg/dL   Magnesium   Result Value Ref Range    Magnesium 2.12 1.60 - 2.40 mg/dL   POCT GLUCOSE   Result Value Ref Range    POCT Glucose 118 (H) 74 - 99 mg/dL         Imaging:   Reviewed          Assessment/Plan:  Assessment & Plan  Generalized abdominal pain  Admit patient to f regular medical floor  Continue oral vancomycin, Flagyl  Will check C. 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  Mixed hyperlipidemia  Continue current medications  DVT (deep venous thrombosis) (Multi)  Continue anticoagulation  Consult hematology  Chronic anemia  Monitor blood count  Transfuse if hemoglobin drops below 7  Pleural effusion on left  Patient underwent imaging guided thoracentesis and 550 cc of pleural hematology was removed  Continue monitoring      DVT Prophylaxis- Addressed    Discussed with patient, RN    SIGNATURE: Derrell Olvera MD  DATE: February 11, 2025  TIME: 9:15 AM      This note was partially created using voice recognition software and is inherently subject to errors including those of syntax and \"sound-alike\" substitutions which may escape proofreading. In such instances, original meaning may be extrapolated by contextual derivation    "

## 2025-02-11 NOTE — CONSULTS
Inpatient consult to Pulmonology  Consult performed by: Gisele Turcios MD  Consult ordered by: Janie Powers, APRN-CNP          Reason For Consult  New at least moderate size free-flowing left pleural  effusion with adjacent multisegmental left lower lobe collapse.  Mucous plugging of a short segment of a left lower lobe bronchus     History Of Present Illness  Josias Ramirez is a 74 y.o. male presenting with PMH NSCLC on L vs skin squamous cell with mets, DM2, anemia, HLD, PUD admitted with abdomiinal pain.    Patient reports presenting for diffuse abdominal pain with acute DVT. CT performed for PE given malignancy history showed effusion on L. Patient reports poor appetite, weight loss, and no respiratory symptoms. He has been started on antibiotics for enterocolitis. Patient follows with oncology at [facility] and reports no prior effusions.      Past Medical History  Past Medical History:   Diagnosis Date    Adenocarcinoma of left lung (Multi)     Cancer (Multi)     Cataracts, bilateral     Chronic anemia     Metastasis (Multi)     Mixed hyperlipidemia     Nonmelanoma skin cancer     Of face, head, right forearm    Parotid lymphadenopathy     Pedal edema     Peptic ulcer disease     Recurrent falls     Squamous cell carcinoma of nose     Tobacco use disorder     Type 2 diabetes mellitus     Vitamin B12 deficiency        Surgical History  Past Surgical History:   Procedure Laterality Date    OTHER SURGICAL HISTORY      Multiple Mohs procedures    PILONIDAL CYST DRAINAGE      SINUS SURGERY      SKIN BIOPSY          Social History  Social History     Tobacco Use    Smoking status: Former     Current packs/day: 0.50     Types: Cigarettes    Smokeless tobacco: Never   Vaping Use    Vaping status: Never Used   Substance Use Topics    Alcohol use: Never    Drug use: Never       Family History  Family History   Problem Relation Name Age of Onset    Other (Diabetes mellitus) Mother      Other (Diabetes mellitus)  "Sister      Other (Liver transplant) Brother              Allergies  Penicillins and Tramadol    Review of Systems     Physical Exam     Vital Signs  Blood pressure (!) 80/42, pulse 105, temperature 36 °C (96.8 °F), temperature source Temporal, resp. rate 18, height 1.803 m (5' 11\"), weight 54.8 kg (120 lb 12.8 oz), SpO2 99%.  Oxygen Therapy  SpO2: 99 %  Medical Gas Therapy: None (Room air)  Medical Gas Delivery Method: Nasal cannula       Relevant Results  CT angio chest for pulmonary embolism 02/10/2025    Narrative  Interpreted By:  Alex Campo,  STUDY:  CT ANGIO CHEST FOR PULMONARY EMBOLISM;  2/10/2025 10:11 am    INDICATION:  Signs/Symptoms:abd pain.      COMPARISON:  11/14/2024    ACCESSION NUMBER(S):  [id number]    ORDERING CLINICIAN:  RONAL IRBEIRO    TECHNIQUE:  Helical data acquisition of the chest was obtained with 50 mL  Omnipaque 350. Images were reformatted in coronal and sagittal  planes. Axial and coronal MIP images were created and reviewed.    FINDINGS:  POTENTIAL LIMITATIONS OF THE STUDY: None    HEART AND VESSELS:  No discrete filling defects within the main pulmonary artery or its  branches.    Main pulmonary artery and its branches are normal in caliber.    The thoracic aorta is of normal course and caliber with vascular  calcifications.    Dense coronary artery calcifications are seen.The study is not  optimized for evaluation of coronary arteries.    The cardiac chambers are not enlarged.    No evidence of pericardial effusion.    MEDIASTINUM AND CUBA, LOWER NECK AND AXILLA:  The visualized thyroid gland is within normal limits.    No evidence of thoracic lymphadenopathy by CT criteria.    Esophagus appears within normal limits as seen.    LUNGS AND AIRWAYS:  The trachea and central airways are patent. No endobronchial lesion.    New layering left effusion with partial consolidation of the left  lower lobe. 14 mm spiculated nodule within the left lung apex not  significantly changed from " the recent prior. 4 mm nodule within the  right lung apex on image 60 unchanged.    UPPER ABDOMEN:  The visualized subdiaphragmatic structures demonstrate no remarkable  findings.    CHEST WALL AND OSSEOUS STRUCTURES:  There are no suspicious osseous lesions. Generalized anasarca.    Impression  1.  No pulmonary emboli.  2. New layering left effusion with compressive atelectatic change  about the left lower lobe.  3. Unchanged 14 mm spiculated nodule within the left lung apex.  Elective PET remains the recommendation for further evaluation.      MACRO:  None    Signed by: Alex Campo 2/10/2025 10:39 AM  Dictation workstation:   [id number]       Assessment/Plan     Mr. Ramirez is a 74M PMH NSCLC on L, skin squamous cell, DM2, anemia, HLD, PUD admitted with abdomiinal pain  Consult for pleural effusion    #Pleural effusion  #Skin vs Lung cancer  #Severe malnutrition    Prior documentation from oncology teams suggests progression of disease on his prior therapy. He has been on immunotherapy but imaging is not suggestive of pneumonitis or infection.   Agree with plan for thoracentesis. Please send: cell count and differential, LDH, total protein, cholesterol, triglycerides, cultures, cytology  Recommend contacting oncologist regarding admission and clinical status.   Recommend nutrition consult    I spent 40 minutes in the professional and overall care of this patient.      Gisele Turcios MD

## 2025-02-11 NOTE — PROGRESS NOTES
" INPATIENT PROGRESS NOTES    PATIENT NAME: Josias Ramirez    MRN: [id number]  SERVICE DATE:  2/11/2025   SERVICE TIME:  10:47 AM    SIGNATURE: Cora Griffith MD         ASSESSMENT :   -Acute descending and sigmoid colitis  -Presents with left-sided abdominal pain and diarrhea  -History of lung cancer, diabetes, PUD, chronic anemia     PLAN:  -Continue ceftriaxone and Flagyl empirically  -Follow-up C. difficile PCR and if negative can discontinue p.o. vancomycin  -Closely monitor for antibiotics side effects including rash, Diarrhea/CDI, thrombocytopenia, LAVINIA, etc.        SUBJECTIVE  Afebrile  No events overnight       OBJECTIVE  PHYSICAL EXAM:   Patient Vitals for the past 24 hrs:   BP Temp Temp src Pulse Resp SpO2 Height Weight   02/11/25 1024 96/52 -- -- -- -- -- -- --   02/11/25 0800 87/51 36.2 °C (97.2 °F) Temporal 93 17 98 % -- --   02/11/25 0000 113/52 36.4 °C (97.5 °F) Temporal 92 16 100 % -- --   02/10/25 2000 81/58 36.2 °C (97.2 °F) Temporal 92 16 100 % -- --   02/10/25 1740 96/50 -- -- -- -- -- -- --   02/10/25 1634 93/50 -- -- -- -- -- -- --   02/10/25 1545 83/56 36 °C (96.8 °F) Temporal 94 18 100 % -- --   02/10/25 1335 -- -- -- -- -- -- 1.803 m (5' 11\") 54.8 kg (120 lb 12.8 oz)   02/10/25 1300 93/51 -- -- 95 16 95 % -- --   02/10/25 1200 100/57 -- -- 98 16 97 % -- --   02/10/25 1130 116/61 -- -- 98 14 98 % -- --         Gen: NAD  GI: Abd soft, Left LLL TTP    Labs:  Lab Results   Component Value Date    WBC 6.1 02/11/2025    HGB 9.1 (L) 02/11/2025    HCT 29.8 (L) 02/11/2025    MCV 99 02/11/2025     02/11/2025     Lab Results   Component Value Date    GLUCOSE 121 (H) 02/11/2025    CALCIUM 7.3 (L) 02/11/2025     02/11/2025    K 4.4 02/11/2025    CO2 22 02/11/2025     02/11/2025    BUN 33 (H) 02/11/2025    CREATININE 1.12 02/11/2025   ESR: --No results found for: \"SEDRATE\"No results found for: \"CRP\"  Lab Results   Component Value Date    ALT 9 (L) 02/10/2025    AST 18 02/10/2025    ALKPHOS " "73 02/10/2025    BILITOT 0.5 02/10/2025         DATA:   Diagnostic tests reviewed for today's visit:    Labs this admission reviewed  Imagings this admission reviewed  Cultures: Reviewed        Cora Griffith MD.   Infectious Disease Attending            This note was partially created using voice recognition software and is inherently subject to errors including those of syntax and \"sound-alike\" substitutions which may escape proofreading. In such instances, original meaning may be extrapolated by contextual derivation       "

## 2025-02-11 NOTE — POST-PROCEDURE NOTE
Interventional Radiology Brief Postprocedure Note    Procedure: Left thoracentesis    Provider: VICTOR M Lezama-CNP    Assistant: None    Diagnosis: Left pleural effusion    Description of procedure: A time out was performed and Left Hemithorax was examined with US and appropriate entry point was confirmed and marked.  The patient was prepped and draped in a sterile manner, 1% lidocaine was used to anesthesize the skin and subcutaneous tissue. A 5F Centesis needle was then introduced through the skin into the pleural space, the centesis catheter was then threaded without difficulty. 550 ml of yellow fluid was removed without difficulty. The catheter was then removed. Specimens labeled and sent to lab per primary team. No immediate complications were noted during and immediately following the procedure.          Medications  As of 02/11/25 1522      sodium chloride 0.9 % bolus 1,000 mL (mL/hr) Total volume:  Not documented* Dosing weight:  58.8   *Total volume has not been documented. View each administration to see the amount administered.     Date/Time Rate/Dose/Volume Action       02/10/25  0255 1,000 mL - 999 mL/hr (over 60 min) New Bag      0441  (over 60 min) Stopped      0716 1,000 mL - 2,000 mL/hr (over 30 min) New Bag      0800  (over 30 min) Stopped               sodium chloride 0.9 % bolus 500 mL (mL/hr) Total volume:  500 mL* Dosing weight:  54.8   *From user-documented volume     Date/Time Rate/Dose/Volume Action       02/11/25  0843 500 mL - 500 mL/hr (over 60 min) New Bag      1022 500 mL Stopped               fentaNYL PF (Sublimaze) injection 50 mcg (mcg) Total dose:  100 mcg Dosing weight:  58.8      Date/Time Rate/Dose/Volume Action       02/10/25  0255 50 mcg Given      0448 50 mcg Given               iohexol (OMNIPaque) 350 mg iodine/mL solution 75 mL (mL) Total volume:  75 mL Dosing weight:  58.8      Date/Time Rate/Dose/Volume Action       02/10/25  0320 75 mL Given               iohexol  (OMNIPaque) 350 mg iodine/mL solution 60 mL (mL) Total volume:  50 mL Dosing weight:  58.8      Date/Time Rate/Dose/Volume Action       02/10/25  0955 50 mL Given               heparin bolus from bag 4,704 Units (Units/kg) Total dose:  Cannot be calculated* Dosing weight:  58.8   *Administration dose not documented     Date/Time Rate/Dose/Volume Action       02/10/25  Canceled Entry               heparin 25,000 Units in dextrose 5% 250 mL (100 Units/mL) infusion (premix) (Units/hr) Total dose:  Not documented* Dosing weight:  58.8   *Total volume has not been documented. View each administration to see the amount administered.     Date/Time Rate/Dose/Volume Action       02/10/25  Canceled Entry               enoxaparin (Lovenox) syringe 60 mg (mg) Total dose:  180 mg Dosing weight:  58.8      Date/Time Rate/Dose/Volume Action       02/10/25  1056 60 mg Given      2129 60 mg Given     02/11/25  0846 60 mg Given               cephalexin (Keflex) capsule 500 mg (mg) Total dose:  500 mg Dosing weight:  58.8      Date/Time Rate/Dose/Volume Action       02/10/25  1143 500 mg Given               metroNIDAZOLE (Flagyl) tablet 500 mg (mg) Total dose:  500 mg Dosing weight:  58.8      Date/Time Rate/Dose/Volume Action       02/10/25  1143 500 mg Given               metroNIDAZOLE (Flagyl) tablet 500 mg (mg) Total dose:  500 mg Dosing weight:  54.8      Date/Time Rate/Dose/Volume Action       02/11/25  1418 500 mg Given               lactated Ringer's infusion (mL/hr) Total volume:  Not documented* Dosing weight:  58.8   *Total volume has not been documented. View each administration to see the amount administered.     Date/Time Rate/Dose/Volume Action       02/11/25  1312 *Not included in total Held by provider      1319 *75 mL/hr Missed               insulin lispro injection 0-5 Units (Units) Total dose:  Cannot be calculated* Dosing weight:  58.8   *Administration dose not documented     Date/Time Rate/Dose/Volume Action        02/10/25  1727 *Not included in total Missed      2301 *Not included in total Missed     02/11/25  0753 *Not included in total Missed      1233 *Not included in total Missed               acetaminophen (Tylenol) tablet 650 mg (mg) Total dose:  650 mg Dosing weight:  54.8      Date/Time Rate/Dose/Volume Action       02/10/25  2130 650 mg Given               lidocaine 4 % patch 2 patch (patch) Total dose:  4 patch Dosing weight:  54.8      Date/Time Rate/Dose/Volume Action       02/10/25  1452 2 patch (over 720 min) Medication Applied     02/11/25  0430  (over 720 min) Medication Removed      0846 2 patch (over 720 min) Medication Applied               celecoxib (CeleBREX) capsule 200 mg (mg) Total dose:  Cannot be calculated* Dosing weight:  54.8   *Administration dose not documented     Date/Time Rate/Dose/Volume Action       02/10/25  1423 *Not included in total Held by provider      1438 *200 mg Missed     02/11/25  0900 *200 mg Missed               cyclobenzaprine (Flexeril) tablet 5 mg (mg) Total dose:  5 mg Dosing weight:  54.8      Date/Time Rate/Dose/Volume Action       02/10/25  1455 5 mg Given               gabapentin (Neurontin) capsule 800 mg (mg) Total dose:  3,200 mg Dosing weight:  54.8      Date/Time Rate/Dose/Volume Action       02/10/25  1452 800 mg Given      2129 800 mg Given     02/11/25  0846 800 mg Given      1418 800 mg Given               lisinopril tablet 2.5 mg (mg) Total dose:  0 mg* Dosing weight:  54.8   *Administration not included in total     Date/Time Rate/Dose/Volume Action       02/11/25  0850 *2.5 mg Missed               rOPINIRole (Requip) tablet 0.5 mg (mg) Total dose:  0.5 mg Dosing weight:  54.8      Date/Time Rate/Dose/Volume Action       02/10/25  2128 0.5 mg Given               SITagliptin phosphate (Januvia) tablet 100 mg (mg) Total dose:  200 mg Dosing weight:  54.8      Date/Time Rate/Dose/Volume Action       02/10/25  1452 100 mg Given     02/11/25  0846 100 mg Given                tamsulosin (Flomax) 24 hr capsule 0.4 mg (mg) Total dose:  0.4 mg Dosing weight:  54.8      Date/Time Rate/Dose/Volume Action       02/10/25  2129 0.4 mg Given               vancomycin (Vancocin) capsule 125 mg (mg) Total dose:  250 mg Dosing weight:  54.8      Date/Time Rate/Dose/Volume Action       02/10/25  1452 125 mg Given     02/11/25  0846 125 mg Given               metroNIDAZOLE (Flagyl) 500 mg in sodium chloride (iso)  mL (mg) Total dose:  1,000 mg* Dosing weight:  54.8   *From user-documented volume     Date/Time Rate/Dose/Volume Action       02/10/25  2129 500 mg (over 60 min) - 100 mL New Bag      2300  (over 60 min) Stopped     02/11/25  0430 500 mg (over 60 min) - 100 mL New Bag      0536  (over 60 min) Stopped               cefTRIAXone (Rocephin) 2 g in sodium chloride 0.9% IV 50 mL (mL/hr) Total dose:  2 g* Dosing weight:  54.8   *From user-documented volume     Date/Time Rate/Dose/Volume Action       02/10/25  1803 2 g - 100 mL/hr (over 30 min) New Bag      1915 50 mL Stopped               lactated Ringer's bolus 500 mL (mL/hr) Total volume:  500 mL* Dosing weight:  54.8   *From user-documented volume     Date/Time Rate/Dose/Volume Action       02/10/25  1541 500 mL - 250 mL/hr (over 120 min) New Bag      1820 500 mL Stopped               lidocaine PF (Xylocaine) 10 mg/mL (1 %) injection (mL) Total volume:  8 mL      Date/Time Rate/Dose/Volume Action       02/11/25  1511 8 mL Given                     Complications: None    Estimated Blood Loss: none        See detailed result report with images in PACS.    The patient tolerated the procedure well without incident or complication and is in stable condition.

## 2025-02-11 NOTE — PROGRESS NOTES
02/11/25 1225   Discharge Planning   Living Arrangements Other (Comment)   Support Systems Family members   Type of Residence Nursing home/residential care   Type of Post Acute Facility Services Other (Comment)   Expected Discharge Disposition Inter  ([facility])   Does the patient need discharge transport arranged? Yes   RoundTrip coordination needed? Yes   Has discharge transport been arranged? No   Patient Choice   Provider Choice list and CMS website (https://medicare.gov/care-compare#search) for post-acute Quality and Resource Measure Data were provided and reviewed with: Family;Patient   Patient / Family choosing to utilize agency / facility established prior to hospitalization Yes   Intensity of Service   Intensity of Service >30 min     Patient from [facility]. Several attempts to meet with patient but off floor for testing. Spoke with his sister, Mayda, and she confirmed plan is to return to [facility] at discharge. She said he is long term care there.   Asked [facility] to send referral.     1300  Patient is a bed hold and may return at any time per [facility]

## 2025-02-11 NOTE — PROGRESS NOTES
"Nutrition Initial Assessment:   Nutrition Assessment    Reason for Assessment: Admission nursing screening (MST=5 for \"recent weight loss without trying and eating poorly\")    Patient is a 74 y.o. male from [facility] presenting with abdominal pain.     Past medical history of squamous cell carcinoma, right side parotid adenopathy, adenocarcinoma left lung with chemo/radiation, NIDDM, anemia, HLD, PUD, vitamin B 12 deficiency, pedal edema     Nutrition History:  Energy Intake: Poor < 50 %  Food and Nutrient History: Pt not wanting to talk today. He has been on and off the floor going to various tests and very exhausted. Caught him briefly in the [place]driguez and he said he would drink \"strawberry\" ones - talking about Ensure. He is also interested in the \"ice cream ones\", but did not want any food. Per chart, he has not been eating well. Attempted to call the [facility] ([phone]), but no one is available. According to our records, pt has lost 15 lbs (11%) since we last saw him in November 2024.       Anthropometrics:  Height: 180.3 cm (5' 11\")   Weight: 54.8 kg (120 lb 12.8 oz)   BMI (Calculated): 16.86  IBW/kg (Dietitian Calculated): 78.02 kg  Percent of IBW: 70.23 %       Weight History:   Wt Readings from Last 10 Encounters:   02/10/25 54.8 kg (120 lb 12.8 oz)   12/05/24 54.9 kg (121 lb)   11/22/24 59.7 kg (131 lb 9.8 oz)   11/19/24 61.2 kg (134 lb 14.7 oz)   11/14/24 61.2 kg (135 lb)   11/03/24 67.1 kg (148 lb)       Weight Change %:  Weight History / % Weight Change: 15 lbs (11%) in 3 months  Significant Weight Loss: Yes    Nutrition Focused Physical Exam Findings:    Subcutaneous Fat Loss:   Defer Subcutaneous Fat Loss Assessment:  (Pt had mask on and covered up to leave for another test. Will complete at a later time when pt available.)  Muscle Wasting:     Edema:  Edema: none  Physical Findings:  Skin: Positive (per wound nurse)  Positive Skin Findings: Pressure injury stage " 3  Digestive System Findings: Loose stool    Nutrition Significant Labs:  CBC Trend:   Results from last 7 days   Lab Units 02/11/25  0601 02/10/25  0232   WBC AUTO x10*3/uL 6.1 7.3   RBC AUTO x10*6/uL 3.00* 3.26*   HEMOGLOBIN g/dL 9.1* 9.8*   HEMATOCRIT % 29.8* 32.3*   MCV fL 99 99   PLATELETS AUTO x10*3/uL 209 207    , BMP Trend:   Results from last 7 days   Lab Units 02/11/25  0601 02/10/25  0442 02/10/25  0232   GLUCOSE mg/dL 121*  --  99   CALCIUM mg/dL 7.3*  --  7.6*   SODIUM mmol/L 140  --  136   POTASSIUM mmol/L 4.4   < > 5.6*   CO2 mmol/L 22  --  25   CHLORIDE mmol/L 106  --  103   BUN mg/dL 33*  --  34*   CREATININE mg/dL 1.12  --  1.21    < > = values in this interval not displayed.    , TPN/PPN Labs:   Results from last 7 days   Lab Units 02/11/25  0601 02/10/25  0442 02/10/25  0232   GLUCOSE mg/dL 121*  --  99   POTASSIUM mmol/L 4.4   < > 5.6*   MAGNESIUM mg/dL 2.12  --   --    SODIUM mmol/L 140  --  136   CHLORIDE mmol/L 106  --  103   ALT U/L  --   --  9*   AST U/L  --   --  18   ALK PHOS U/L  --   --  73   BILIRUBIN TOTAL mg/dL  --   --  0.5    < > = values in this interval not displayed.        Nutrition Specific Medications:  Scheduled medications  cefTRIAXone, 2 g, intravenous, q24h  [Held by provider] celecoxib, 200 mg, oral, Daily  enoxaparin, 1 mg/kg, subcutaneous, q12h  gabapentin, 800 mg, oral, TID  insulin lispro, 0-5 Units, subcutaneous, Before meals & nightly  lidocaine, 2 patch, transdermal, Daily  lisinopril, 2.5 mg, oral, Daily  metroNIDAZOLE, 500 mg, oral, q8h LASHELL  rOPINIRole, 0.5 mg, oral, Nightly  SITagliptin phosphate, 100 mg, oral, Daily  tamsulosin, 0.4 mg, oral, Nightly  vancomycin, 125 mg, oral, Daily      Continuous medications     PRN medications  PRN medications: acetaminophen, alum-mag hydroxide-simeth, alum-mag hydroxide-simeth, bisacodyl, cyclobenzaprine, dextrose, dextrose, glucagon, glucagon, lidocaine, magnesium hydroxide, polyethylene glycol, sodium  "phosphates      I/O:    ; Stool Appearance: Liquid, Watery (02/11/25 1346)    Dietary Orders (From admission, onward)       Start     Ordered    02/11/25 1021  Oral nutritional supplements  Until discontinued        Question Answer Comment   Deliver with All meals    Select supplement: Ensure Plus High Protein       Placed in \"And\" Linked Group    02/11/25 1021    02/11/25 1021  Oral nutritional supplements  Until discontinued        Question Answer Comment   Deliver with Dinner    Deliver with Lunch    Select supplement: Magic Cup       Placed in \"And\" Linked Group    02/11/25 1021    02/10/25 1315  May Participate in Room Service  ( ROOM SERVICE MAY PARTICIPATE)  Once        Question:  .  Answer:  Yes    02/10/25 1314    02/10/25 1300  Adult diet Consistent Carb; CCD 60 gm/meal  Diet effective now        Question Answer Comment   Diet type Consistent Carb    Carb diet selection: CCD 60 gm/meal        02/10/25 1300                     Estimated Needs:   Total Energy Estimated Needs in 24 hours (kCal):  (1434-6183 kcal (30-35 kcal/kg))     Total Protein Estimated Needs in 24 Hours (g):  (69-82g protein (1.25-1.5g/kg))     Total Fluid Estimated Needs in 24 Hours (mL):  (5050-0845 mL (1mL/kcal))           Nutrition Diagnosis   Malnutrition Diagnosis  Patient has Malnutrition Diagnosis: Yes  Diagnosis Status: New  Malnutrition Diagnosis: Severe malnutrition related to chronic disease or condition  As Evidenced by: significant weight loss of 15 lbs (11%) within 3 months and decreased oral intakes of <75% of est nutritional needs for >1 month.    Nutrition Diagnosis  Patient has Nutrition Diagnosis: Yes  Diagnosis Status (1): New  Nutrition Diagnosis 1: Underweight  Related to (1): inadequate oral intakes and chronic nutritional stress  As Evidenced by (1): BMI 16       Nutrition Interventions/Recommendations   Nutrition prescription for oral nutrition    Nutrition Recommendations:  Individualized Nutrition " Prescription Provided for : Suggest liberalized diet - REGULAR - to encourage intakes. Will add ENSURE PLUS HP with meals and MAGIC CUPS to meals.    Nutrition Interventions/Goals:   Interventions: Meals and snacks, Medical food supplement  Meals and Snacks: General healthful diet  Medical Food Supplement: Commercial beverage medical food supplement therapy  Goal: ENSURE PLUS HP = 350 kcal/20g protein, MAGIC CUPS = 290 kcal/9g protein      Education Documentation  No documentation found.            Nutrition Monitoring and Evaluation   Food/Nutrient Related History Monitoring  Monitoring and Evaluation Plan: Estimated Energy Intake, Intake / amount of food  Intake / Amount of food: Consumes at least 75% or more of meals/snacks/supplements    Anthropometric Measurements  Monitoring and Evaluation Plan: Body weight  Body Weight: Body weight - Promote weight restoration         Physical Exam Findings  Monitoring and Evaluation Plan: Skin  Skin Finding: Imparied wound healing - Skin to heal    Goal Status: New goal(s) identified    Time Spent (min): 30 minutes

## 2025-02-11 NOTE — PROGRESS NOTES
Physical Therapy                 Therapy Communication Note    Patient Name: Josias Ramirez  MRN: [id number]  Department: [facility] CR NONV1  Room: [address] [place], [place] [address]  Today's Date: 2/11/2025     Discipline: Physical Therapy    PT Missed Visit: Yes     Missed Time: Attempt    Comment:  PT orders received, chart reviewed.  Pt currently off floor for testing.  Will re-attempt PT eval as able.

## 2025-02-11 NOTE — CARE PLAN
The patient's goals for the shift include Pt will remain free of falls     The clinical goals for the shift include Pt will remain hemodynamically stable

## 2025-02-11 NOTE — PROGRESS NOTES
Occupational Therapy                 Therapy Communication Note    Patient Name: Josias Ramirez  MRN: [id number]  Department: [address]  Room: [facility]-A  Today's Date: 2/11/2025     Discipline: Occupational Therapy    OT Missed Visit: Yes     Missed Visit Reason: Missed Visit Reason: Patient in a medical procedure (off the floor)    Comment: Received orders for OT eval 2/10. Completed chart review, and attempted OT eval. Pt was off the floor for a procedure. Will hold OT eval and complete as appropriate.

## 2025-02-11 NOTE — CONSULTS
Consults    Reason For Consult  Hx adenocarcinoma of the lung now with acute pleural effusion     Primary care provider: Leena Adams APRN-CNS  Referring provider: Derrell Olvera MD    History Of Present Illness  Josias Ramirez is a 74 y.o. male with PMHx of squamous cell carcinoma of the nose, right side parotid adenopathy, adenocarcinoma of upper left lung lobe, NIDDM, chronic anemia, HLD, PUD presenting from [facility] for left-sided abdominal pain and diarrhea. He had been experiencing abdominal pain since yesterday and non-bloody diarrhea for months. States that his nursing home had been giving him pills for diarrhea, but did not help. Is now no longer having abdominal pain today. Unable to recall what pills were being administered to him at [place] for diarrhea. Notes decreased appetite, weight loss of about 20lbs, fatigue and weakness. Patient has a chronic decubitus ulcer wound on his sacrum. Uses a wheelchair for ambulation. States that he's been very weak recently and unable to walk anymore. He was previously able to walk with a walker/cane a couple months ago. Also endorsed leg pain upon arrival and was found to have non-occlusive DVT visualized in the distal external iliac, common femoral and proximal femoral veins of the LLE and occlusion of the left proximal femoral artery, was subsequently started on lovenox in ED. Vascular surgery service has been consulted. CTA chest showed new layering left effusion with partial consolidation of the left lower lobe. 14 mm spiculated nodule within the left lung apex not significantly changed from the recent prior. 4 mm nodule within the right lung apex on image 60 unchanged. Denies fever or chills, chest pain, shortness of breath, nausea, vomiting, melena/hematechezia, urinary urgency/frequency/burning.     Regarding patient's oncologic history, he did not recall having lung cancer or undergoing treatment for it during evaluation today. Reports that he used to  smoke ~1ppd since he was 20 years old and recently quit about 3 months ago. Patient also has squamous cell carcinoma of the nose. Unable to recall who he follows for oncology care or what cancer treatments he has received or is supposed to received. Upon chart review, he was previously seen at [facility] for oncology with most recent note being from 11/12/2024 and documented ongoing treatment. Notes that he used to spend a lot of time in the sun due to going to [place] for 2 months each year, every year. Oncology consult request for further recommendations regarding patient's previous cancer history and presenting symptoms with concerns for progression of disease.        Past Medical History  He has a past medical history of Adenocarcinoma of left lung (Multi), Cancer (Multi), Cataracts, bilateral, Chronic anemia, Metastasis (Multi), Mixed hyperlipidemia, Nonmelanoma skin cancer, Parotid lymphadenopathy, Pedal edema, Peptic ulcer disease, Recurrent falls, Squamous cell carcinoma of nose, Tobacco use disorder, Type 2 diabetes mellitus, and Vitamin B12 deficiency.    Surgical History  He has a past surgical history that includes Sinus surgery; Pilonidal cyst drainage; Skin biopsy; and Other surgical history.     Social History  He reports that he has quit smoking. His smoking use included cigarettes. He has never used smokeless tobacco. He reports that he does not drink alcohol and does not use drugs.    Family History  Family History   Problem Relation Name Age of Onset    Other (Diabetes mellitus) Mother      Other (Diabetes mellitus) Sister      Other (Liver transplant) Brother          Allergies  Penicillins and Tramadol    Review of Systems  Review of Systems   Constitutional:  Positive for appetite change, fatigue and unexpected weight change. Negative for chills and fever.   HENT:           Squamous cell carcinoma area of the nose   Respiratory: Negative.     Cardiovascular: Negative.    Gastrointestinal:   Positive for diarrhea. Negative for abdominal pain, blood in stool, constipation, nausea and vomiting.   Musculoskeletal:         Weakness   Skin:  Positive for wound.        Decubitus ulcer on sacrum   Neurological:  Negative for dizziness and light-headedness.   Psychiatric/Behavioral: Negative.           Physical Exam  Constitutional:       Appearance: He is ill-appearing.   HENT:      Head: Normocephalic.      Nose: Bandage in place over nose that appears dry and intact.     Mouth: Mucous membranes are dry.   Eyes:      Extraocular Movements: Extraocular movements intact.   Cardiovascular:      Rate and Rhythm: Normal rate and regular rhythm.      Pulses: Normal pulses.      Heart sounds: Normal heart sounds.   Pulmonary:      Effort: Pulmonary effort is normal.      Breath sounds: Diminished breath sounds.   Abdominal:      General: Bowel sounds are normal.      Palpations: Abdomen is soft.      Tenderness: No abdominal tenderness. There is no guarding or rebound.   Musculoskeletal:         General: Tenderness present.      Cervical back: Normal range of motion.      Right lower leg: No edema.      Left lower leg: No edema.   Skin:     General: Skin is warm and dry.      Capillary Refill: Capillary refill takes less than 2 seconds.   Neurological:      General: No focal deficit present.      Mental Status: AOx3  Psychiatric:         Mood and Affect: Mood normal.         Behavior: Behavior normal.        Last Recorded Vitals  /52 (BP Location: Left arm, Patient Position: Lying)   Pulse 108   Temp 36.5 °C (97.7 °F) (Temporal)   Resp 18   Wt 54.8 kg (120 lb 12.8 oz)   SpO2 96%     Relevant Results  Results for orders placed or performed during the hospital encounter of 02/10/25 (from the past 24 hours)   Stool Pathogen Panel, PCR    Specimen: Stool   Result Value Ref Range    Campylobacter Group Not Detected Not Detected    Salmonella species Not Detected Not Detected    Shigella species Not Detected  Not Detected    Vibrio Group Not Detected Not Detected    Yersinia Enterocolitica Not Detected Not Detected    Shiga Toxin 1 Not Detected Not Detected    Shiga Toxin 2 Not Detected Not Detected    Norovirus GI/GII Not Detected Not Detected    Rotavirus A Not Detected Not Detected   C. difficile, PCR    Specimen: Stool   Result Value Ref Range    C. difficile, PCR Not Detected Not Detected   POCT GLUCOSE   Result Value Ref Range    POCT Glucose 117 (H) 74 - 99 mg/dL   CBC   Result Value Ref Range    WBC 6.1 4.4 - 11.3 x10*3/uL    nRBC 0.0 0.0 - 0.0 /100 WBCs    RBC 3.00 (L) 4.50 - 5.90 x10*6/uL    Hemoglobin 9.1 (L) 13.5 - 17.5 g/dL    Hematocrit 29.8 (L) 41.0 - 52.0 %    MCV 99 80 - 100 fL    MCH 30.3 26.0 - 34.0 pg    MCHC 30.5 (L) 32.0 - 36.0 g/dL    RDW 14.8 (H) 11.5 - 14.5 %    Platelets 209 150 - 450 x10*3/uL   Basic Metabolic Panel   Result Value Ref Range    Glucose 121 (H) 74 - 99 mg/dL    Sodium 140 136 - 145 mmol/L    Potassium 4.4 3.5 - 5.3 mmol/L    Chloride 106 98 - 107 mmol/L    Bicarbonate 22 21 - 32 mmol/L    Anion Gap 16 10 - 20 mmol/L    Urea Nitrogen 33 (H) 6 - 23 mg/dL    Creatinine 1.12 0.50 - 1.30 mg/dL    eGFR 69 >60 mL/min/1.73m*2    Calcium 7.3 (L) 8.6 - 10.3 mg/dL   Magnesium   Result Value Ref Range    Magnesium 2.12 1.60 - 2.40 mg/dL   POCT GLUCOSE   Result Value Ref Range    POCT Glucose 118 (H) 74 - 99 mg/dL   POCT GLUCOSE   Result Value Ref Range    POCT Glucose 115 (H) 74 - 99 mg/dL   Body Fluid Cell Count   Result Value Ref Range    Color, Fluid Yellow Colorless, Straw, Yellow    Clarity, Fluid Clear Clear    WBC, Fluid 240 See Comment /uL    RBC, Fluid <1,000 0  /uL /uL   Body Fluid Differential   Result Value Ref Range    Neutrophils %, Manual, Fluid 29 <25 % %    Lymphocytes %, Manual, Fluid 36 <75 % %    Mono/Macrophages %, Manual, Fluid 35 <70 % %    Eosinophils %, Manual, Fluid 0 0 % %    Basophils %, Manual, Fluid 0 0 % %    Immature Granulocytes %, Manual, Fluid 0 0 % %     Blasts %, Manual, Fluid 0 0 % %    Unclassified Cells %, Manual, Fluid 0 0 % %    Plasma Cells %, Manual, Fluid 0 0 % %    Total Cells Counted, Fluid 100    POCT GLUCOSE   Result Value Ref Range    POCT Glucose 159 (H) 74 - 99 mg/dL          Vascular US Ankle Brachial Index (TREVOR) Without Exercise    Result Date: 2/11/2025            [facility] [place] [address] [place] [address]     Tel [phone] Fax [phone]  Vascular Lab Report  VASC US ANKLE BRACHIAL INDEX (TREVOR) WITHOUT EXERCISE Patient Name:      JAMEY Crews Physician:  [id number] RAHAT Shen MD Study Date:        2/11/2025            Ordering Provider:  [id number] CHETNA ECHEVERRIA MRN/PID:           [id number]             Fellow: Accession#:        [id number]         Technologist:       Ysabel Herndon RVT, RDMS Date of Birth/Age: 1950 / 74 years Technologist 2: Gender:            M                    Encounter#:         [id number] Admission Status:  Inpatient            Location Performed: [facility]  Diagnosis/ICD: Unspecified (asymptomatic) atherosclerosis of native arteries of                extremities, left leg-I70.202; Peripheral vascular disease,                unspecified-I73.9 Indication:    LSFA occlusion on duplex CPT Codes:     44207 Peripheral artery TREVOR Only  Pertinent History: Hyperlipidemia and Previous DVT. Left prox/mid SFA occlusion                    on duplex.                    There is a right upper arm IV line. BP is not taken here.  **CRITICAL RESULT** Critical Result: Severe ABIs. Notification called to ARTEMIO Hdz on 2/11/2025 at 12:52:15 PM. Acknowledged critical results notification communicated via secure chat by Ysabel Herndon RDMS, RVT.  CONCLUSIONS: Right Lower PVR: Evidence of severe  arterial occlusive disease in the right lower extremity at rest. Monophasic flow is noted in the right popliteal artery and right posterior tibial artery. Multiphasic flow is noted in the right common femoral artery. The right DP pulse is unobtainable. The digit is flatlined. Left Lower PVR: Evidence of severe arterial occlusive disease in the left lower extremity at rest. Monophasic flow is noted in the left popliteal artery and left posterior tibial artery. Multiphasic flow is noted in the left common femoral artery. The right DP pulse is unobtainable. The digit is flatlined.  Imaging & Doppler Findings:  RIGHT Lower PVR                Pressures Ratios Right Posterior Tibial (Ankle) 23 mmHg   0.26 Right Dorsalis Pedis (Ankle)   0 mmHg    0.00 Right Digit (Great Toe)        0 mmHg    0.00   LEFT Lower PVR                Pressures Ratios Left Posterior Tibial (Ankle) 46 mmHg   0.52 Left Dorsalis Pedis (Ankle)   0 mmHg    0.00 Left Digit (Great Toe)        0 mmHg    0.00                      Left Brachial Pressure 89 mmHg   [id number] RAHAT Shen MD Electronically signed by Charlene Shen MD on 2/11/2025 at 3:59:10 PM  ** Final **     IR guided thoracentesis    Result Date: 2/11/2025  Interpreted By:  Patrick Alarcon, STUDY: IR GUIDED THORACENTESIS; 2/11/20253:41 pm   INDICATION: Signs/Symptoms:pleural effusion, needing thora   COMPARISON: None.   ACCESSION NUMBER(S): [id number]   ORDERING CLINICIAN: BRYSON ALEJO   TECHNIQUE: INTERVENTIONALIST: Patrick Alarcon CNP   CONSENT: The patient/patient's POA/next of kin was informed of the nature of the proposed procedure. The purposes, alternatives, risks, and benefits were explained and discussed. All questions were answered and consent was obtained.   SEDATION: None   MEDICATION/CONTRAST: Lidocaine 1%.   TIME OUT: A time out was performed immediately prior to procedure start with the interventional team, correctly identifying the patient name, date of birth,  MRN, procedure, anatomy (including marking of site and side), patient position, procedure consent form, relevant laboratory and imaging test results, antibiotic administration, safety precautions, and procedure-specific equipment needs.   FINDINGS: The patient was placed in the right lateral position.   The patient's left pleural space was scanned using the ultrasound probe. The area of fluid most amenable to drainage was marked.   The patient's skin was sterilized using chlorhexidine and draped in sterile manner. The skin was anesthestized with 1% lidocaine. A 5F valved centesis catheter was inserted into the pleural space where previously marked. A total of 550 mL of pleural fluid was removed. The catheter was then removed and gauze and a tegaderm were placed over the insertion site. Sterile technique used throughout entirety of procedure.   Specimens were obtained, labeled and sent to lab with requisitions ordered by primary team.   The patient tolerated the procedure well and there were no immediate complications.       Uneventful thoracentesis, as detailed above. left Pleural space, 550 mL.   Performed and dictated at [facility].   Signed by: Patrick Alarcon 2/11/2025 3:43 PM Dictation workstation:   [id number]    CT angio chest for pulmonary embolism    Result Date: 2/10/2025  Interpreted By:  Alex Campo, STUDY: CT ANGIO CHEST FOR PULMONARY EMBOLISM;  2/10/2025 10:11 am   INDICATION: Signs/Symptoms:abd pain.     COMPARISON: 11/14/2024   ACCESSION NUMBER(S): [id number]   ORDERING CLINICIAN: RONAL RIBEIRO   TECHNIQUE: Helical data acquisition of the chest was obtained with 50 mL Omnipaque 350. Images were reformatted in coronal and sagittal planes. Axial and coronal MIP images were created and reviewed.   FINDINGS: POTENTIAL LIMITATIONS OF THE STUDY: None   HEART AND VESSELS: No discrete filling defects within the main pulmonary artery or its branches.   Main pulmonary  artery and its branches are normal in caliber.   The thoracic aorta is of normal course and caliber with vascular calcifications.   Dense coronary artery calcifications are seen.The study is not optimized for evaluation of coronary arteries.   The cardiac chambers are not enlarged.   No evidence of pericardial effusion.   MEDIASTINUM AND CUBA, LOWER NECK AND AXILLA: The visualized thyroid gland is within normal limits.   No evidence of thoracic lymphadenopathy by CT criteria.   Esophagus appears within normal limits as seen.   LUNGS AND AIRWAYS: The trachea and central airways are patent. No endobronchial lesion.   New layering left effusion with partial consolidation of the left lower lobe. 14 mm spiculated nodule within the left lung apex not significantly changed from the recent prior. 4 mm nodule within the right lung apex on image 60 unchanged.   UPPER ABDOMEN: The visualized subdiaphragmatic structures demonstrate no remarkable findings.   CHEST WALL AND OSSEOUS STRUCTURES: There are no suspicious osseous lesions. Generalized anasarca.       1.  No pulmonary emboli. 2. New layering left effusion with compressive atelectatic change about the left lower lobe. 3. Unchanged 14 mm spiculated nodule within the left lung apex. Elective PET remains the recommendation for further evaluation.     MACRO: None   Signed by: Alex Campo 2/10/2025 10:39 AM Dictation workstation:   [id number]    CT abdomen pelvis w IV contrast    Result Date: 2/10/2025  Interpreted By:  Shahid Baird, STUDY: CT ABDOMEN PELVIS W IV CONTRAST;  2/10/2025 3:33 am   INDICATION: Signs/Symptoms:bilateral lower quadrant abdominal pain.     COMPARISON: CT abdomen and pelvis with contrast 14 November 2024   ACCESSION NUMBER(S): [id number]   ORDERING CLINICIAN: RONAL RIBEIRO   TECHNIQUE: CT of the abdomen and pelvis from the lung bases through the symphysis pubis after the uneventful administration of intravenous contrast (75 mL Omnipaque 350). No  oral contrast.   FINDINGS: LOWER CHEST: New at least moderate size free-flowing left pleural effusion with adjacent multisegmental left lower lobe collapse. Mucous plugging of a short segment of a left lower lobe bronchus   BONES: No acute skeletal findings.   LIVER: Normal. No enlargement or evidence of cirrhosis or fatty change. No mass or other suspect lesion.   SPLEEN: Normal. No enlargement, mass or evidence of splenic vein thrombosis.   PANCREAS: Normal. No CT evidence of acute or chronic pancreatitis. No duct dilation. No mass.   GALLBLADDER: Borderline dilated. CT cannot exclude stones. No wall thickening or adjacent fluid   BILE DUCTS: Normal. No biliary duct dilation.   ADRENAL GLANDS: Normal. No nodule or mass.   KIDNEYS AND URETERS: Normal.  No hydronephrosis on either side.  No mass.  Symmetric enhancement.  No infarct or CT evidence of acute pyelonephritis.  No substantial radiodense stone.  Tiny stones and radiolucent stones could be occult on CT.   LYMPH NODES: No adenopathy, intraperitoneal, retroperitoneal, pelvic or otherwise   APPENDIX: Normal.  Not dilated, thick walled or in any other way inflamed in appearance.  No inflammatory change about the appendix.   COLON: Wall thickening of and inner wall hyperenhancement in the left, sigmoid and rectum; featureless in the transverse colon; right colon unremarkable   SMALL BOWEL: Normal. No small bowel dilation or any other sign of small bowel obstruction. No sign of active inflammatory bowel disease.   STOMACH / DUODENUM: Grossly normal by CT which has limited sensitivity and specificity for the stomach and duodenum.   RETROPERITONEUM: Impressive edema throughout new from prior   OMENTUM, MESENTERY AND PERITONEAL SPACES: Free intraperitoneal air: Negative Free intraperitoneal fluid: Negative Abscess: Negative Other: n/a   URINARY BLADDER: Distended but not thick-walled. No stone or acute blood clot in the lumen   PELVIS: No obvious acute adnexal  abnormality by CT   VASCULATURE: Densely calcified but normal caliber abdominal aorta and iliac arteries. Probability of acute deep venous thrombosis of left iliofemoral vein as annotated with an arrow on axial 142   ABDOMINAL WALL: Hernia: Negative Other: Massive edema new from prior       There are findings of both chronic and acute colitis, with the acute element in the left, sigmoid and rectum whereas transverse colon does not appear acutely inflamed but featureless from chronic colitis   Impressive fluid overload. There is new edema throughout the retroperitoneum and subcutaneous fat and mesenteric edema all new from last CT November 20, 2024   Probability of acute deep venous thrombosis of left iliofemoral vein as annotated with an arrow in the groin on axial 142   No acute appendicitis, small bowel obstruction, perforation, abscess or free fluid   MACRO: None   Signed by: Shahid Baird 2/10/2025 9:48 AM Dictation workstation:   [id number]    ECG 12 lead    Result Date: 2/10/2025  Normal sinus rhythm Low voltage QRS Septal infarct , age undetermined Abnormal ECG When compared with ECG of 22-NOV-2024 14:32, Nonspecific T wave abnormality, worse in Lateral leads    Vascular US lower extremity venous duplex left    Result Date: 2/10/2025            [facility] [address] [place] [address]     Tel [phone] Fax [phone]  Vascular Lab Report  VASC US LOWER EXTREMITY VENOUS DUPLEX LEFT Patient Name:      JAMEY Crews Physician:  [id number] Peggy Mathew MD Study Date:        2/10/2025            Ordering Provider:  [id number] RONAL RIBEIRO MRN/PID:           [id number]             Fellow: Accession#:        [id number]         Technologist:       Ysabel Herndon RVT, MS Date of Birth/Age: 1950 / 74 years Technologist 2: Gender:            M                     Encounter#:         [id number] Admission Status:  Emergency            Location Performed: [facility]  Diagnosis/ICD: Pain in left leg-M79.605 Indication:    Left leg pain, concern for DVT on CT imaging. CPT Codes:     35187 Peripheral venous duplex scan for DVT Limited  **CRITICAL RESULT** Critical Result: Left EIV, CFV & proximal FV DVT. Notification called to Dr. Lopes & Dr. Worrell on 2/10/2025 at 8:42:24 AM. Acknowledged critical results notification communicated via secure chat by Ysabel Herndon RDMS, RVT.  CONCLUSIONS: Right Lower Venous: The right common femoral vein demonstrates normal spontaneous and respirophasic flow. Left Lower Venous: There is acute non-occlusive deep vein thrombosis visualized in the distal external iliac, common femoral and proximal femoral veins. The remainder of the left leg is negative for deep vein thrombosis. Additional Findings: There are lymph nodes in the left groin that measure 1.3 cm & 1.2 cm. 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 There is distal flow in the mid-distal femoral artery and the popliteal artery.  Imaging & Doppler Findings:  Right        Flow CFV   Spontaneous/Phasic  Left                  Compress      Thrombus              Flow Distal External Iliac    No    Acute non-occlusive Spontaneous/Phasic CFV                      No    Acute non-occlusive Spontaneous/Phasic FV Proximal              No    Acute non-occlusive Spontaneous/Phasic FV Mid                  Yes           None FV Distal               Yes           None Popliteal               Yes           None         Spontaneous/Phasic Peroneal                Yes           None PTV                     Yes           None  [id number] Peggy Mathew MD Electronically signed by [id number] Peggy Mathew MD on 2/10/2025 at 8:50:10 AM  ** Final **         Assessment/Plan   Adenocarcinoma of upper lobe of left lung s/p chemoradiation therapy   -Previous smoking history of 1ppd since he was 20  years old; reports that he quit ~3 months ago  -Patient denies any breathing difficulties or shortness of breath  -Follows with [facility] for oncology care. Lung biopsy via [facility] on 05/28/2024 showed invasive adenocarcinoma with later imaging showing no evidence of metastatic disease to abdomen or pelvis. Note from 08/13/2024 by Dr. Mason Daigle (Rad Onc) had SBRT to TYRON adenocarcinoma. Now seeing Dr. Hale in lieu of Dr. Daigle.   -CTA 02/10/2025 showed new layering left effusion with partial consolidation of the left lower lobe; 14 mm spiculated nodule within the left lung apex that has not significantly changed compared to previous imaging, 4 mm nodule within the right lung apex. CTA was negative for PE.   -Pulmonology service consulted  -Underwent thoracentesis with IR 02/11/2025, awaiting cytology results  -Patient used to live closer to [facility], but has since relocated to the [facility] and would prefer to obtain further oncologic care at [facility] location.   -Recommendation is to wait for cytology results with plan for outpatient follow-up to discuss. Will reach out to patient's established [facility] oncology team for possibility of transfer of care to  oncology as patient lives on the [place] side now.      2. 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 Sees Dr. Marion Bunch (dermatologist) and Dr. Dutch Yuen (ENT).  -From [facility] oncology visit note on 11/12/2024, patient was receiving partial course of chemoradiation at 22 Gy and weekly cisplatin. (Intended dose of 70 Gy). Right neck is also involved with a single PET positive node; CT neck from 08/27/2024 showed  disease progression. He had taken a break from treatment after falling and hurting his back but will be seeing his oncologist again. Notes does detail that patient has had difficulty with treatment compliance despite discussed importance of completing his regimen.   -Patient has also been previously recommended for possible rib lesion biopsy to determine etiology of oligometastatic disease, systemic tx of nasal SCC, and SBRT for the lung adenocarcinoma.       3. Acute DVT   -Presented with leg pain, but no longer endorsing leg pain today.  -Duplex showed non-occlusive DVT visualized in the distal external iliac, common femoral and proximal femoral veins of the LLE and occlusion of the left proximal femoral artery  -Started on lovenox in ED  -Vascular surgery consulted, TREVOR ordered  -Recommendation is to continue with anticoagulation, spoke with patient and he would prefer Lovenox subcutaneous injections instead of oral       4. Diarrhea, weight loss, fatigue, loss of appetite  -CT imaging showed acute descending and sigmoid colitis   -Symptoms may be attributed to patient's cancer   -ID consulted, patient started on ceftriaxone and Flagyl empirically with Cdif PCR sent  -Monitor H/H, electrolytes   -Nutrition service consulted          Nikky Gomez MS4    ATTENDING NOTE:  1) lung cancer  -CT angio of chest done yesterday showed no discrete filling defects within the main pulmonary artery or its branches; no evidence of thoracic lymphadenopathy by CT criteria; new layering left effusion with partial consolidation of left lower lobe; 14 mm spiculated nodule within the left lung apex not significantly changed; 4 mm nodule within right lung apex unchanged  -he underwent thoracentesis today with removal of 550 ml pleural fluid on the left--sent for cytology  -4/2024 CT scan done at [facility] showed 1.4 cm TYRON lung nodule  -subsequent PET showed mildly hypermetabolic TYRON lung nodule with mildly hypermetabolic left  infraclavicular lymph node 1.5 x 1.1 cm  -5/28/2024 bx of TYRON lesion showed invasive adenocarcinoma of lung  -he was treated with SBRT from 10/8/2024 to 10/21/2024  -was resumed on pembrolizumab (was being used to treat advanced/recurrent SCC of skin) to now also treat his lung cancer  -however pembrolizumab was stopped by 9/2024  -his NSCLC likely has progressed, and if cytology is positive, that would then confirm stage IV disease  -if cytology confirmed to be positive, will then see if NGS and PD-L1 can be done, otherwise will check Guardant 360 (liquid bx) as outpatient    2) squamous cell carcinoma of skin  -developed advanced SCC of skin on left ear and left forearm  -he was started on pembrolizumab at [facility], then lost to followup after 9 cycles   -4/2024 presented back to [facility] with large lesion on his nose; bx showed moderately differentiated invasive malignant epithelial neoplasm, favor SCC  -restarted pembrolizumab 5/2024  -started chemoradiation (with weekly cisplatin) at [facility], completing only 22 Gy (out of 70 Gy planned) as of 11/12/2024, then lost to followup  -pt was moved to [facility] by his sister and currently lives at [place]--Josias says it will not be possible to return to [facility]    3) colitis  -on PO vanco  -on PO flagyl  -on IV ceftriaxone    4) BPH  -on flomax    5) diabetes  -on januvia  -on insulin lispro    6) DVT  -doppler of legs showed in left leg there is acute non-occlusive DVT in distal external iliac, common femoral and proximal femoral veins; occlusion of the left proximal femoral artery  -on lovenox  -as pt is currently resident of [place], he would prefer injectables, so continue lovenox 1mg/kg subcutaneously Q12h rather than switching to oral agent    8) hypertension  -on lisinopril      I spent 75 minutes with the overall and professional care of this patient

## 2025-02-11 NOTE — CARE PLAN
The clinical goals for the shift include Patient will remain safe with no fall/injury and use call light appropriately for assistance as needed thru the end of this shift. Remained safe. Blood pressure was slightly low at start of shift. Last blood pressure 113/52. No other issues.       Problem: Chronic Conditions and Co-morbidities  Goal: Patient's chronic conditions and co-morbidity symptoms are monitored and maintained or improved  Outcome: Progressing     Problem: Nutrition  Goal: Nutrient intake appropriate for maintaining nutritional needs  Outcome: Progressing     Problem: Discharge Planning  Goal: Discharge to home or other facility with appropriate resources  Outcome: Progressing     Problem: Safety - Adult  Goal: Free from fall injury  Outcome: Progressing     Problem: Pain - Adult  Goal: Verbalizes/displays adequate comfort level or baseline comfort level  Outcome: Progressing     Problem: Skin  Goal: Decreased wound size/increased tissue granulation at next dressing change  Outcome: Progressing  Flowsheets (Taken 2/11/2025 0504)  Decreased wound size/increased tissue granulation at next dressing change:   Protective dressings over bony prominences   Promote sleep for wound healing  Goal: Participates in plan/prevention/treatment measures  Outcome: Progressing  Flowsheets (Taken 2/11/2025 0504)  Participates in plan/prevention/treatment measures: Elevate heels  Goal: Prevent/manage excess moisture  Outcome: Progressing  Flowsheets (Taken 2/11/2025 0504)  Prevent/manage excess moisture:   Cleanse incontinence/protect with barrier cream   Monitor for/manage infection if present  Goal: Prevent/minimize sheer/friction injuries  Outcome: Progressing  Flowsheets (Taken 2/11/2025 0504)  Prevent/minimize sheer/friction injuries:   Use pull sheet   HOB 30 degrees or less   Turn/reposition every 2 hours/use positioning/transfer devices  Goal: Promote/optimize nutrition  Outcome: Progressing  Goal: Promote skin  healing  Outcome: Progressing  Flowsheets (Taken 2/11/2025 3107)  Promote skin healing:   Assess skin/pad under line(s)/device(s)   Turn/reposition every 2 hours/use positioning/transfer devices   Protective dressings over bony prominences     Problem: Fall/Injury  Goal: Not fall by end of shift  Outcome: Progressing  Goal: Be free from injury by end of the shift  Outcome: Progressing  Goal: Verbalize understanding of personal risk factors for fall in the hospital  Outcome: Progressing  Goal: Verbalize understanding of risk factor reduction measures to prevent injury from fall in the home  Outcome: Progressing  Goal: Use assistive devices by end of the shift  Outcome: Progressing  Goal: Pace activities to prevent fatigue by end of the shift  Outcome: Progressing     Problem: Diabetes  Goal: Maintain electrolyte levels within acceptable range throughout shift  Outcome: Progressing  Goal: Maintain glucose levels >70mg/dl to <250mg/dl throughout shift  Outcome: Progressing  Goal: No changes in neurological exam by end of shift  Outcome: Progressing  Goal: Learn about and adhere to nutrition recommendations by end of shift  Outcome: Progressing  Goal: Vital signs within normal range for age by end of shift  Outcome: Progressing  Goal: Increase self care and/or family involovement by end of shift  Outcome: Progressing

## 2025-02-12 LAB
ANION GAP SERPL CALC-SCNC: 16 MMOL/L (ref 10–20)
APPEARANCE UR: CLEAR
BACTERIA #/AREA URNS AUTO: ABNORMAL /HPF
BILIRUB UR STRIP.AUTO-MCNC: NEGATIVE MG/DL
BUN SERPL-MCNC: 33 MG/DL (ref 6–23)
CALCIUM SERPL-MCNC: 7.4 MG/DL (ref 8.6–10.3)
CHLORIDE SERPL-SCNC: 108 MMOL/L (ref 98–107)
CO2 SERPL-SCNC: 21 MMOL/L (ref 21–32)
COLOR UR: YELLOW
CREAT SERPL-MCNC: 1.22 MG/DL (ref 0.5–1.3)
EGFRCR SERPLBLD CKD-EPI 2021: 62 ML/MIN/1.73M*2
ERYTHROCYTE [DISTWIDTH] IN BLOOD BY AUTOMATED COUNT: 15.1 % (ref 11.5–14.5)
GLUCOSE BLD MANUAL STRIP-MCNC: 128 MG/DL (ref 74–99)
GLUCOSE BLD MANUAL STRIP-MCNC: 135 MG/DL (ref 74–99)
GLUCOSE BLD MANUAL STRIP-MCNC: 136 MG/DL (ref 74–99)
GLUCOSE BLD MANUAL STRIP-MCNC: 136 MG/DL (ref 74–99)
GLUCOSE FLD-MCNC: 137 MG/DL
GLUCOSE SERPL-MCNC: 143 MG/DL (ref 74–99)
GLUCOSE UR STRIP.AUTO-MCNC: NORMAL MG/DL
HCT VFR BLD AUTO: 30.4 % (ref 41–52)
HGB BLD-MCNC: 9 G/DL (ref 13.5–17.5)
KETONES UR STRIP.AUTO-MCNC: ABNORMAL MG/DL
LDH FLD L TO P-CCNC: 49 U/L
LEUKOCYTE ESTERASE UR QL STRIP.AUTO: ABNORMAL
MAGNESIUM SERPL-MCNC: 2.21 MG/DL (ref 1.6–2.4)
MCH RBC QN AUTO: 29.7 PG (ref 26–34)
MCHC RBC AUTO-ENTMCNC: 29.6 G/DL (ref 32–36)
MCV RBC AUTO: 100 FL (ref 80–100)
NITRITE UR QL STRIP.AUTO: ABNORMAL
NRBC BLD-RTO: 0 /100 WBCS (ref 0–0)
PH FLD: 8.92 [PH]
PH UR STRIP.AUTO: 5.5 [PH]
PLATELET # BLD AUTO: 201 X10*3/UL (ref 150–450)
POTASSIUM SERPL-SCNC: 4.2 MMOL/L (ref 3.5–5.3)
PROT FLD-MCNC: 1.6 G/DL
PROT UR STRIP.AUTO-MCNC: ABNORMAL MG/DL
RBC # BLD AUTO: 3.03 X10*6/UL (ref 4.5–5.9)
RBC # UR STRIP.AUTO: ABNORMAL MG/DL
RBC #/AREA URNS AUTO: ABNORMAL /HPF
SODIUM SERPL-SCNC: 141 MMOL/L (ref 136–145)
SP GR UR STRIP.AUTO: 1.04
SQUAMOUS #/AREA URNS AUTO: ABNORMAL /HPF
UROBILINOGEN UR STRIP.AUTO-MCNC: NORMAL MG/DL
WBC # BLD AUTO: 8.5 X10*3/UL (ref 4.4–11.3)
WBC #/AREA URNS AUTO: ABNORMAL /HPF

## 2025-02-12 PROCEDURE — 2500000001 HC RX 250 WO HCPCS SELF ADMINISTERED DRUGS (ALT 637 FOR MEDICARE OP): Performed by: INTERNAL MEDICINE

## 2025-02-12 PROCEDURE — 1200000002 HC GENERAL ROOM WITH TELEMETRY DAILY

## 2025-02-12 PROCEDURE — 2500000001 HC RX 250 WO HCPCS SELF ADMINISTERED DRUGS (ALT 637 FOR MEDICARE OP): Performed by: NURSE PRACTITIONER

## 2025-02-12 PROCEDURE — 2500000005 HC RX 250 GENERAL PHARMACY W/O HCPCS: Performed by: NURSE PRACTITIONER

## 2025-02-12 PROCEDURE — 80048 BASIC METABOLIC PNL TOTAL CA: CPT | Performed by: NURSE PRACTITIONER

## 2025-02-12 PROCEDURE — 82947 ASSAY GLUCOSE BLOOD QUANT: CPT

## 2025-02-12 PROCEDURE — 2500000001 HC RX 250 WO HCPCS SELF ADMINISTERED DRUGS (ALT 637 FOR MEDICARE OP): Performed by: PHARMACIST

## 2025-02-12 PROCEDURE — 36415 COLL VENOUS BLD VENIPUNCTURE: CPT | Performed by: NURSE PRACTITIONER

## 2025-02-12 PROCEDURE — 2500000004 HC RX 250 GENERAL PHARMACY W/ HCPCS (ALT 636 FOR OP/ED): Performed by: NURSE PRACTITIONER

## 2025-02-12 PROCEDURE — 83735 ASSAY OF MAGNESIUM: CPT | Performed by: NURSE PRACTITIONER

## 2025-02-12 PROCEDURE — 2500000004 HC RX 250 GENERAL PHARMACY W/ HCPCS (ALT 636 FOR OP/ED): Performed by: INTERNAL MEDICINE

## 2025-02-12 PROCEDURE — 2500000004 HC RX 250 GENERAL PHARMACY W/ HCPCS (ALT 636 FOR OP/ED)

## 2025-02-12 PROCEDURE — 85027 COMPLETE CBC AUTOMATED: CPT | Performed by: NURSE PRACTITIONER

## 2025-02-12 PROCEDURE — 81001 URINALYSIS AUTO W/SCOPE: CPT | Performed by: NURSE PRACTITIONER

## 2025-02-12 PROCEDURE — 99232 SBSQ HOSP IP/OBS MODERATE 35: CPT | Performed by: NURSE PRACTITIONER

## 2025-02-12 PROCEDURE — 2500000002 HC RX 250 W HCPCS SELF ADMINISTERED DRUGS (ALT 637 FOR MEDICARE OP, ALT 636 FOR OP/ED): Performed by: NURSE PRACTITIONER

## 2025-02-12 PROCEDURE — 87086 URINE CULTURE/COLONY COUNT: CPT | Mod: STJLAB | Performed by: NURSE PRACTITIONER

## 2025-02-12 PROCEDURE — 99232 SBSQ HOSP IP/OBS MODERATE 35: CPT | Performed by: INTERNAL MEDICINE

## 2025-02-12 RX ORDER — ENOXAPARIN SODIUM 100 MG/ML
1 INJECTION SUBCUTANEOUS EVERY 12 HOURS
Status: DISCONTINUED | OUTPATIENT
Start: 2025-02-12 | End: 2025-02-16 | Stop reason: HOSPADM

## 2025-02-12 RX ORDER — LOPERAMIDE HYDROCHLORIDE 2 MG/1
2 CAPSULE ORAL 4 TIMES DAILY PRN
Status: DISCONTINUED | OUTPATIENT
Start: 2025-02-12 | End: 2025-02-16 | Stop reason: HOSPADM

## 2025-02-12 RX ADMIN — VANCOMYCIN HYDROCHLORIDE 125 MG: 125 CAPSULE ORAL at 08:45

## 2025-02-12 RX ADMIN — ENOXAPARIN SODIUM 60 MG: 60 INJECTION SUBCUTANEOUS at 08:45

## 2025-02-12 RX ADMIN — LOPERAMIDE HYDROCHLORIDE 2 MG: 2 CAPSULE ORAL at 18:02

## 2025-02-12 RX ADMIN — METRONIDAZOLE 500 MG: 500 TABLET ORAL at 06:10

## 2025-02-12 RX ADMIN — SITAGLIPTIN 100 MG: 100 TABLET, FILM COATED ORAL at 08:45

## 2025-02-12 RX ADMIN — GABAPENTIN 800 MG: 400 CAPSULE ORAL at 18:01

## 2025-02-12 RX ADMIN — METRONIDAZOLE 500 MG: 500 TABLET ORAL at 18:02

## 2025-02-12 RX ADMIN — ROPINIROLE 0.5 MG: 0.5 TABLET, FILM COATED ORAL at 20:27

## 2025-02-12 RX ADMIN — LISINOPRIL 2.5 MG: 2.5 TABLET ORAL at 08:45

## 2025-02-12 RX ADMIN — TAMSULOSIN HYDROCHLORIDE 0.4 MG: 0.4 CAPSULE ORAL at 20:27

## 2025-02-12 RX ADMIN — GABAPENTIN 800 MG: 400 CAPSULE ORAL at 08:45

## 2025-02-12 RX ADMIN — LIDOCAINE 4% 2 PATCH: 40 PATCH TOPICAL at 08:46

## 2025-02-12 RX ADMIN — CEFTRIAXONE 2 G: 2 INJECTION, POWDER, FOR SOLUTION INTRAMUSCULAR; INTRAVENOUS at 18:02

## 2025-02-12 RX ADMIN — ENOXAPARIN SODIUM 50 MG: 60 INJECTION SUBCUTANEOUS at 20:28

## 2025-02-12 RX ADMIN — GABAPENTIN 800 MG: 400 CAPSULE ORAL at 20:27

## 2025-02-12 ASSESSMENT — COGNITIVE AND FUNCTIONAL STATUS - GENERAL
EATING MEALS: A LOT
TOILETING: A LOT
PERSONAL GROOMING: A LOT
DRESSING REGULAR UPPER BODY CLOTHING: A LOT
WALKING IN HOSPITAL ROOM: A LOT
MOVING FROM LYING ON BACK TO SITTING ON SIDE OF FLAT BED WITH BEDRAILS: A LITTLE
STANDING UP FROM CHAIR USING ARMS: A LOT
TURNING FROM BACK TO SIDE WHILE IN FLAT BAD: A LOT
MOVING TO AND FROM BED TO CHAIR: A LOT
DAILY ACTIVITIY SCORE: 12
HELP NEEDED FOR BATHING: A LOT
MOBILITY SCORE: 13
CLIMB 3 TO 5 STEPS WITH RAILING: A LOT
DRESSING REGULAR LOWER BODY CLOTHING: A LOT

## 2025-02-12 ASSESSMENT — PAIN SCALES - GENERAL
PAINLEVEL_OUTOF10: 0 - NO PAIN
PAINLEVEL_OUTOF10: 5 - MODERATE PAIN

## 2025-02-12 ASSESSMENT — PAIN - FUNCTIONAL ASSESSMENT: PAIN_FUNCTIONAL_ASSESSMENT: 0-10

## 2025-02-12 NOTE — PROGRESS NOTES
Internal Medicine Progress Note    Patient Name: Josias Ramirez          MRN: [id number]  Today's Date: February 12, 2025          Attending: Derrell Olvera MD    Subjective:  Patient was seen and examined at bedside, patient lays down in bed, he still has diarrhea, he denies abdominal pain.    Review Of Systems:  GENERAL: No malaise or fevers.  CARDIOVASCULAR: Negative for chest pain, leg swelling  RESPIRATORY: Negative for shortness of breath  GI: No nausea, vomiting, or diarrhea  MUSCULOSKELETAL: Negative for joint pain or swelling    Objective:  Vitals:    02/11/25 2000 02/12/25 0000 02/12/25 0400 02/12/25 0800   BP: 97/52 99/55 97/53 100/57   BP Location: Left arm Left arm Left arm Left arm   Patient Position: Lying Lying Lying Lying   Pulse: 104 100 97 97   Resp: 18 17 15 17   Temp: 36.7 °C (98.1 °F) 36.9 °C (98.4 °F) 36.6 °C (97.9 °F) 36.2 °C (97.2 °F)   TempSrc: Temporal Temporal Temporal Temporal   SpO2: 100% 99% 94% 96%   Weight:       Height:               Physical Exam:   General appearance: Well-appearing alert, in no acute distress   Lungs: Clear to auscultation, no wheezing or rhonchi  Heart: RRR without murmur, gallop, or rubs.   Abdomen: Soft, non-tender. Bowel sounds normal.  Extremities: No deformity, no edema  Neuro: Alert oriented x3, no focal deficit.    Labs:  Results for orders placed or performed during the hospital encounter of 02/10/25 (from the past 24 hours)   POCT GLUCOSE   Result Value Ref Range    POCT Glucose 115 (H) 74 - 99 mg/dL   pH, Body Fluid   Result Value Ref Range    pH, Fluid 8.92 See Below   Sterile Fluid Culture/Smear    Specimen: Pleural; Fluid   Result Value Ref Range    Gram Stain (2+) Few Polymorphonuclear leukocytes     Gram Stain No organisms seen    Lactate Dehydrogenase, Body Fluid   Result Value Ref Range    LD, Fluid 49 Not established. U/L   Protein, Total, Body Fluid   Result Value Ref Range    Protein, Total Fluid 1.6 Not established g/dL   Glucose, Body Fluid    Result Value Ref Range    Glucose, Fluid 137 Not established mg/dL   Body Fluid Cell Count   Result Value Ref Range    Color, Fluid Yellow Colorless, Straw, Yellow    Clarity, Fluid Clear Clear    WBC, Fluid 240 See Comment /uL    RBC, Fluid <1,000 0  /uL /uL   Body Fluid Differential   Result Value Ref Range    Neutrophils %, Manual, Fluid 29 <25 % %    Lymphocytes %, Manual, Fluid 36 <75 % %    Mono/Macrophages %, Manual, Fluid 35 <70 % %    Eosinophils %, Manual, Fluid 0 0 % %    Basophils %, Manual, Fluid 0 0 % %    Immature Granulocytes %, Manual, Fluid 0 0 % %    Blasts %, Manual, Fluid 0 0 % %    Unclassified Cells %, Manual, Fluid 0 0 % %    Plasma Cells %, Manual, Fluid 0 0 % %    Total Cells Counted, Fluid 100    POCT GLUCOSE   Result Value Ref Range    POCT Glucose 159 (H) 74 - 99 mg/dL   POCT GLUCOSE   Result Value Ref Range    POCT Glucose 130 (H) 74 - 99 mg/dL   CBC   Result Value Ref Range    WBC 8.5 4.4 - 11.3 x10*3/uL    nRBC 0.0 0.0 - 0.0 /100 WBCs    RBC 3.03 (L) 4.50 - 5.90 x10*6/uL    Hemoglobin 9.0 (L) 13.5 - 17.5 g/dL    Hematocrit 30.4 (L) 41.0 - 52.0 %     80 - 100 fL    MCH 29.7 26.0 - 34.0 pg    MCHC 29.6 (L) 32.0 - 36.0 g/dL    RDW 15.1 (H) 11.5 - 14.5 %    Platelets 201 150 - 450 x10*3/uL   Basic Metabolic Panel   Result Value Ref Range    Glucose 143 (H) 74 - 99 mg/dL    Sodium 141 136 - 145 mmol/L    Potassium 4.2 3.5 - 5.3 mmol/L    Chloride 108 (H) 98 - 107 mmol/L    Bicarbonate 21 21 - 32 mmol/L    Anion Gap 16 10 - 20 mmol/L    Urea Nitrogen 33 (H) 6 - 23 mg/dL    Creatinine 1.22 0.50 - 1.30 mg/dL    eGFR 62 >60 mL/min/1.73m*2    Calcium 7.4 (L) 8.6 - 10.3 mg/dL   Magnesium   Result Value Ref Range    Magnesium 2.21 1.60 - 2.40 mg/dL   POCT GLUCOSE   Result Value Ref Range    POCT Glucose 136 (H) 74 - 99 mg/dL       Medications:  Scheduled medications  cefTRIAXone, 2 g, intravenous, q24h  [Held by provider] celecoxib, 200 mg, oral, Daily  enoxaparin, 1 mg/kg, subcutaneous,  "q12h  gabapentin, 800 mg, oral, TID  insulin lispro, 0-5 Units, subcutaneous, Before meals & nightly  lidocaine, 2 patch, transdermal, Daily  lisinopril, 2.5 mg, oral, Daily  metroNIDAZOLE, 500 mg, oral, q8h LASHELL  rOPINIRole, 0.5 mg, oral, Nightly  SITagliptin phosphate, 100 mg, oral, Daily  tamsulosin, 0.4 mg, oral, Nightly  vancomycin, 125 mg, oral, Daily      Continuous medications     PRN medications  PRN medications: acetaminophen, alum-mag hydroxide-simeth, alum-mag hydroxide-simeth, bisacodyl, cyclobenzaprine, dextrose, dextrose, glucagon, glucagon, lidocaine, loperamide, magnesium hydroxide, polyethylene glycol, sodium phosphates      Assessment/Plan:  Assessment & Plan  Generalized abdominal pain  Stool pathogens was negative  Patient still has diarrhea  Will start patient on Imodium  Patient is still on oral vancomycin and Flagyl  ID is following  Type 2 diabetes mellitus  Sliding scale insulin  Monitor blood sugar  Nonmelanoma skin cancer    Mixed hyperlipidemia  Continue current medications  DVT (deep venous thrombosis) (Multi)  Continue anticoagulation  Hematology is following  Chronic anemia  Monitor blood count  Transfuse if hemoglobin drops below 7  Pleural effusion on left  Continue monitoring    Discussed with patient, RN    Derrell Olvera MD   Date: 02/12/25  Time: 9:44 AM    This note was partially created using voice recognition software and is inherently subject to errors including those of syntax and \"sound-alike\" substitutions which may escape proofreading. In such instances, original meaning may be extrapolated by contextual derivation    "

## 2025-02-12 NOTE — ASSESSMENT & PLAN NOTE
Admit patient to f regular medical floor  Continue oral vancomycin, Flagyl  Will check C. difficile and stool pathogens  ID consult

## 2025-02-12 NOTE — CARE PLAN
The patient's goals for the shift include loose stools controlled     The clinical goals for the shift include Pt will be free from injury

## 2025-02-12 NOTE — PROGRESS NOTES
"Josias Ramirez is a 74 y.o. male on day 1 of admission presenting with Generalized abdominal pain.    Subjective   Mr. Ramirez did not notice any improvement in his symptoms with the thoracentesis. He denied any respiratory symptoms today.     Objective     Physical Exam  Vitals reviewed.   Constitutional:       Appearance: He is ill-appearing.   HENT:      Head: Normocephalic.      Nose:      Comments: bandaged     Mouth/Throat:      Mouth: Mucous membranes are moist.   Eyes:      Extraocular Movements: Extraocular movements intact.   Cardiovascular:      Rate and Rhythm: Normal rate and regular rhythm.      Heart sounds: No murmur heard.  Pulmonary:      Breath sounds: No wheezing or rhonchi.      Comments: Mild L sided crackles at the base  Abdominal:      Palpations: Abdomen is soft.   Musculoskeletal:      Right lower leg: No edema.      Left lower leg: No edema.   Skin:     General: Skin is warm and dry.   Neurological:      General: No focal deficit present.      Mental Status: He is alert and oriented to person, place, and time.         Last Recorded Vitals  Blood pressure 100/57, pulse 97, temperature 36.2 °C (97.2 °F), temperature source Temporal, resp. rate 17, height 1.803 m (5' 11\"), weight 54.8 kg (120 lb 12.8 oz), SpO2 96%.  Intake/Output last 3 Shifts:  I/O last 3 completed shifts:  In: 1040 (19 mL/kg) [P.O.:240; IV Piggyback:800]  Out: 550 (10 mL/kg) [Urine:550 (0.3 mL/kg/hr)]  Weight: 54.8 kg     Relevant Results  Scheduled medications  cefTRIAXone, 2 g, intravenous, q24h  [Held by provider] celecoxib, 200 mg, oral, Daily  enoxaparin, 1 mg/kg, subcutaneous, q12h  gabapentin, 800 mg, oral, TID  insulin lispro, 0-5 Units, subcutaneous, Before meals & nightly  lidocaine, 2 patch, transdermal, Daily  lisinopril, 2.5 mg, oral, Daily  metroNIDAZOLE, 500 mg, oral, q8h LASHELL  rOPINIRole, 0.5 mg, oral, Nightly  SITagliptin phosphate, 100 mg, oral, Daily  tamsulosin, 0.4 mg, oral, Nightly      Continuous " medications     PRN medications  PRN medications: acetaminophen, alum-mag hydroxide-simeth, alum-mag hydroxide-simeth, bisacodyl, cyclobenzaprine, dextrose, dextrose, glucagon, glucagon, lidocaine, loperamide, magnesium hydroxide, polyethylene glycol, sodium phosphates  Results for orders placed or performed during the hospital encounter of 02/10/25 (from the past 24 hours)   pH, Body Fluid   Result Value Ref Range    pH, Fluid 8.92 See Below   Sterile Fluid Culture/Smear    Specimen: Pleural; Fluid   Result Value Ref Range    Sterile Fluid Culture/Smear No growth to date     Gram Stain (2+) Few Polymorphonuclear leukocytes     Gram Stain No organisms seen    Lactate Dehydrogenase, Body Fluid   Result Value Ref Range    LD, Fluid 49 Not established. U/L   Protein, Total, Body Fluid   Result Value Ref Range    Protein, Total Fluid 1.6 Not established g/dL   Glucose, Body Fluid   Result Value Ref Range    Glucose, Fluid 137 Not established mg/dL   Body Fluid Cell Count   Result Value Ref Range    Color, Fluid Yellow Colorless, Straw, Yellow    Clarity, Fluid Clear Clear    WBC, Fluid 240 See Comment /uL    RBC, Fluid <1,000 0  /uL /uL   Body Fluid Differential   Result Value Ref Range    Neutrophils %, Manual, Fluid 29 <25 % %    Lymphocytes %, Manual, Fluid 36 <75 % %    Mono/Macrophages %, Manual, Fluid 35 <70 % %    Eosinophils %, Manual, Fluid 0 0 % %    Basophils %, Manual, Fluid 0 0 % %    Immature Granulocytes %, Manual, Fluid 0 0 % %    Blasts %, Manual, Fluid 0 0 % %    Unclassified Cells %, Manual, Fluid 0 0 % %    Plasma Cells %, Manual, Fluid 0 0 % %    Total Cells Counted, Fluid 100    POCT GLUCOSE   Result Value Ref Range    POCT Glucose 159 (H) 74 - 99 mg/dL   POCT GLUCOSE   Result Value Ref Range    POCT Glucose 130 (H) 74 - 99 mg/dL   CBC   Result Value Ref Range    WBC 8.5 4.4 - 11.3 x10*3/uL    nRBC 0.0 0.0 - 0.0 /100 WBCs    RBC 3.03 (L) 4.50 - 5.90 x10*6/uL    Hemoglobin 9.0 (L) 13.5 - 17.5 g/dL     Hematocrit 30.4 (L) 41.0 - 52.0 %     80 - 100 fL    MCH 29.7 26.0 - 34.0 pg    MCHC 29.6 (L) 32.0 - 36.0 g/dL    RDW 15.1 (H) 11.5 - 14.5 %    Platelets 201 150 - 450 x10*3/uL   Basic Metabolic Panel   Result Value Ref Range    Glucose 143 (H) 74 - 99 mg/dL    Sodium 141 136 - 145 mmol/L    Potassium 4.2 3.5 - 5.3 mmol/L    Chloride 108 (H) 98 - 107 mmol/L    Bicarbonate 21 21 - 32 mmol/L    Anion Gap 16 10 - 20 mmol/L    Urea Nitrogen 33 (H) 6 - 23 mg/dL    Creatinine 1.22 0.50 - 1.30 mg/dL    eGFR 62 >60 mL/min/1.73m*2    Calcium 7.4 (L) 8.6 - 10.3 mg/dL   Magnesium   Result Value Ref Range    Magnesium 2.21 1.60 - 2.40 mg/dL   POCT GLUCOSE   Result Value Ref Range    POCT Glucose 136 (H) 74 - 99 mg/dL   POCT GLUCOSE   Result Value Ref Range    POCT Glucose 136 (H) 74 - 99 mg/dL             Malnutrition Diagnosis Status: New  Malnutrition Diagnosis: Severe malnutrition related to chronic disease or condition     As Evidenced by: significant weight loss of 15 lbs (11%) within 3 months and decreased oral intakes of <75% of est nutritional needs for >1 month.  I agree with the dietitian's malnutrition diagnosis.      Assessment/Plan   Assessment & Plan  Generalized abdominal pain    Type 2 diabetes mellitus    Chronic anemia    Nonmelanoma skin cancer    Mixed hyperlipidemia    DVT (deep venous thrombosis) (Multi)    Pleural effusion on left    Mr. Ramirez is a 74M PMH NSCLC on L, skin squamous cell, DM2, anemia, HLD, PUD admitted with abdomiinal pain  Consult for pleural effusion     #Pleural effusion  #Skin vs Lung cancer  #Severe malnutrition     Thora 2/11: 550mL, 29% PMN, 36% lymph, glu 137, LDH 49, TP 1.6, cytology pending    Prior documentation from oncology teams suggests progression of disease on his prior therapy. He has been on immunotherapy but imaging is not suggestive of pneumonitis or infection.   Thoracentesis is transudative with higher than usual  lymphocytes and neutrophils. Cytology and  cultures pending.   Recommend contacting oncologist regarding admission and clinical status.   Recommend nutrition consult  Agree with antibiotic coverage      I spent 30 minutes in the professional and overall care of this patient.      Gisele Turcios MD

## 2025-02-12 NOTE — PROGRESS NOTES
Physical Therapy                 Therapy Communication Note    Patient Name: Josias Ramirez  MRN: [id number]  Department: [facility] 3 S  Room: 3126/3126-A  Today's Date: 2/12/2025     Discipline: Physical Therapy    Comment: Updated chart review, and it appears that pt is from LTC, and has plans to return when discharged. NO need for skilled PT at this time. Please reconsult if needed.

## 2025-02-12 NOTE — PROGRESS NOTES
Date of Service: 12/3/24      HPI/Subjective  Josias Ramirez is a 74 y.o. male  Past medical history of squamous cell carcinoma of the nose, lung CA, tobacco use disorder, type 2 diabetes, hyperlipidemia, chronic anemia, peptic ulcer disease, GERD.  He was recently admitted to [facility] for recurrent falls.  Was found to have urinary retention and had Manning placed.  Patient require follow-up with urology for void trial.    Seen and examined at bedside.  Working well with PT OT.  Currently complaining of chronic nonspecific pain related to recent hospitalization, history of lung cancer.  Discussed starting tramadol and patient is agreeable.  Risks and benefits discussed.  No other complaints or concerns at this time.  ROS negative unless mentioned above.      Other Medical, Surgical, Family, Social Hx, Allergies per chart in PCC.   Medication list reviewed. Please see PCC.     Objective:   Physical Exam     Vital signs reviewed. Please see chart in PCC.     General: NAD. NCAT.  Alert, awake  HEENT: PERRLA. EOMI. MMM.  History of squamous cell carcinoma of the nose  Cardiovascular: RRR. S1/S2 wnl.   Respiratory: CTABL. No acute respiratory distress.   GI: Soft, NT abdomen. BS present x 4.   MSK: Generalized weakness  Extremities: No major edema.   Skin: No visible rashes or bruises.   Neuro: Cranial Nerves grossly intact.  No new acute focal deficits noted   Psych: Mood wnl.          REVIEW OF SYSTEMS   ROS reviewed within HPI and is otherwise negative       Assessment and Plan  Encounter Diagnoses   Name Primary?    Generalized weakness Yes    Recurrent falls     Urinary retention     Other chronic pain     Type 2 diabetes mellitus with hyperlipidemia (Multi)     Chronic anemia      -Initiated tramadol 50mg PO q8 hours for moderate to severe pain.   -Manning in place. Follow up with urology for TOV.   -PT/OT as tolerated  -Continue januvia for DM. Monitor BG levels. Low carb diet.   -lisinopril continued for  HTN.   -Pre-Admission hospital notes reviewed  -Pertinent radiology, if any, reviewed   -Current rehab plan reviewed; continue pending any major changes  -Current medication therapy reviewed. Continue and monitor for adverse effects.  -Monitor vitals  -Monitor labs  -Continue home medications for chronic medical conditions.   -PT/OT as tolerated  -Low carb, Low sodium, Low fat diet advised         Charting was completed using voice recognition technology and may include unintended errors.    Maureen Byrd MD

## 2025-02-12 NOTE — ASSESSMENT & PLAN NOTE
Patient underwent imaging guided thoracentesis and 550 cc of pleural hematology was removed  Continue monitoring

## 2025-02-12 NOTE — CARE PLAN
The patient's goals for the shift include  Rest/Comfort    The clinical goals for the shift include pt will be free from injury    Problem: Safety - Adult  Goal: Free from fall injury  Outcome: Progressing

## 2025-02-12 NOTE — PROGRESS NOTES
Subjective Data:  75yo patient presented with acute abdominal pain, acute on chronic back pain, and chronic diarrhea.     Overnight Events:    Afebrile. Hypotensive overnight. Reports continued diarrhea. Sitting in bed eating lunch. Reports bilateral leg pain, LLE>RLE.      Objective Data:  Last Recorded Vitals:  Vitals:    02/11/25 2000 02/12/25 0000 02/12/25 0400 02/12/25 0800   BP: 97/52 99/55 97/53 100/57   BP Location: Left arm Left arm Left arm Left arm   Patient Position: Lying Lying Lying Lying   Pulse: 104 100 97 97   Resp: 18 17 15 17   Temp: 36.7 °C (98.1 °F) 36.9 °C (98.4 °F) 36.6 °C (97.9 °F) 36.2 °C (97.2 °F)   TempSrc: Temporal Temporal Temporal Temporal   SpO2: 100% 99% 94% 96%   Weight:       Height:         Last I/O:  I/O last 3 completed shifts:  In: 1040 (19 mL/kg) [P.O.:240; IV Piggyback:800]  Out: 550 (10 mL/kg) [Urine:550 (0.3 mL/kg/hr)]  Weight: 54.8 kg     Physical Exam  Vitals reviewed.   Constitutional:       Appearance: He is cachectic. He is ill-appearing.      Interventions: Nasal cannula in place.   HENT:      Head: Normocephalic.      Mouth/Throat:      Mouth: Mucous membranes are dry.   Eyes:      Conjunctiva/sclera: Conjunctivae normal.   Neck:      Vascular: No carotid bruit.   Cardiovascular:      Rate and Rhythm: Normal rate and regular rhythm.      Pulses:           Radial pulses are 2+ on the right side and 2+ on the left side.        Femoral pulses are 2+ on the right side and 2+ on the left side.       Popliteal pulses are 1+ on the right side and 1+ on the left side.        Posterior tibial pulses are detected w/ Doppler on the right side and detected w/ Doppler on the left side.   Pulmonary:      Effort: Pulmonary effort is normal.      Breath sounds: Normal breath sounds.   Musculoskeletal:      Right lower leg: No edema.      Left lower leg: No edema.   Feet:      Right foot:      Skin integrity: No skin breakdown.      Toenail Condition: Right toenails are abnormally  thick.      Left foot:      Skin integrity: No skin breakdown.      Toenail Condition: Left toenails are abnormally thick.   Skin:     Coloration: Skin is pale.      Comments: Lesion to left proximal tibia.  Neurological:      Mental Status: He is oriented to person, place, and time.   Psychiatric:         Mood and Affect: Mood normal.     Last Labs:  CBC - 2/12/2025:  6:58 AM  8.5 9.0 201    30.4      CMP - 2/12/2025:  6:58 AM  7.4 5.2 18 --- 0.5   3.8 2.1 9 73      PTT - No results in last year.  1.1   12.5 _     TROPHS   Date/Time Value Ref Range Status   02/10/2025 02:32 AM 6 0 - 20 ng/L Final   11/19/2024 12:16 PM 12 0 - 20 ng/L Final   11/19/2024 10:18 AM 14 0 - 20 ng/L Final     BNP   Date/Time Value Ref Range Status   11/14/2024 09:40  0 - 99 pg/mL Final     HGBA1C   Date/Time Value Ref Range Status   04/26/2024 10:13 AM 9.3 4.0 - 5.6 % Final   03/03/2023 11:40 AM 8.9 4.0 - 5.6 % Final      Diagnostic Imaging:   VASC US LOWER EXTREMITY VENOUS DUPLEX LEFT     Patient Name:      JAMEY Crews Physician:  [id number] Peggy Mathew MD  Study Date:        2/10/2025            Ordering Provider:  [id number] RONAL RIBEIRO  MRN/PID:           [id number]             Fellow:  Accession#:        [id number]         Technologist:       Ysabel Herndon                                                              JEFF, [facility]  Date of Birth/Age: 1950 / 74 years Technologist 2:  Gender:            M                    Encounter#:         [id number]  Admission Status:  Emergency            Location Performed: [facility]        Diagnosis/ICD: Pain in left leg-M79.605  Indication:    Left leg pain, concern for DVT on CT imaging.  CPT Codes:     84832 Peripheral venous duplex scan for DVT Limited        **CRITICAL RESULT**  Critical Result: Left EIV, CFV & proximal FV DVT.  Notification called to Dr. Ribeiro & Dr. Worrell on 2/10/2025 at 8:42:24  AM. Acknowledged critical results notification communicated via secure chat by Ysabel Herndon RDMS, RVT.     CONCLUSIONS:  Right Lower Venous: The right common femoral vein demonstrates normal spontaneous and respirophasic flow.  Left Lower Venous: There is acute non-occlusive deep vein thrombosis visualized in the distal external iliac, common femoral and proximal femoral veins. The remainder of the left leg is negative for deep vein thrombosis.  Additional Findings:  There are lymph nodes in the left groin that measure 1.3 cm & 1.2 cm.  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 There is distal flow  in the mid-distal femoral artery and the popliteal artery.        Imaging & Doppler Findings:     Right        Flow  CFV   Spontaneous/Phasic        Left                  Compress      Thrombus              Flow  Distal External Iliac    No    Acute non-occlusive Spontaneous/Phasic  CFV                      No    Acute non-occlusive Spontaneous/Phasic  FV Proximal              No    Acute non-occlusive Spontaneous/Phasic  FV Mid                  Yes           None  FV Distal               Yes           None  Popliteal               Yes           None         Spontaneous/Phasic  Peroneal                Yes           None  PTV                     Yes           None        [id number] Peggy Mathew MD  Electronically signed by [id number] Peggy Mathew MD on 2/10/2025 at 8:50:10 AM    [facility] ANKLE BRACHIAL INDEX (TREVOR) WITHOUT EXERCISE     Patient Name:      JAMEY Crews Physician:  [id number] RAHAT Shen MD  Study Date:        2/11/2025            Ordering Provider:  [id number] CHETNA ECHEVERRIA  MRN/PID:           [id number]             Fellow:  Accession#:        [id number]         Technologist:       Ysabel Herndon RVT,  SHERLY  Date of Birth/Age: 1950 / 74 years Technologist 2:  Gender:            M                    Encounter#:         [id number]  Admission Status:  Inpatient            Location Performed: [facility]        Diagnosis/ICD: Unspecified (asymptomatic) atherosclerosis of native arteries of                 extremities, left leg-I70.202; Peripheral vascular disease,                 unspecified-I73.9  Indication:    LSFA occlusion on duplex  CPT Codes:     10306 Peripheral artery TREVOR Only        Pertinent History: Hyperlipidemia and Previous DVT. Left prox/mid SFA occlusion                     on duplex.                     There is a right upper arm IV line. BP is not taken here.        **CRITICAL RESULT**  Critical Result: Severe ABIs.  Notification called to ARTEMIO Hdz on 2/11/2025 at 12:52:15 PM. Acknowledged critical results notification communicated via secure chat by Ysabel Herndon RDMS, RVT.     CONCLUSIONS:  Right Lower PVR: Evidence of severe arterial occlusive disease in the right lower extremity at rest. Monophasic flow is noted in the right popliteal artery and right posterior tibial artery. Multiphasic flow is noted in the right common femoral artery. The right DP pulse is unobtainable. The digit is flatlined.  Left Lower PVR: Evidence of severe arterial occlusive disease in the left lower extremity at rest. Monophasic flow is noted in the left popliteal artery and left posterior tibial artery. Multiphasic flow is noted in the left common femoral artery. The right DP pulse is unobtainable. The digit is flatlined.     Imaging & Doppler Findings:     RIGHT Lower PVR                Pressures Ratios  Right Posterior Tibial (Ankle) 23 mmHg   0.26  Right Dorsalis Pedis (Ankle)   0 mmHg    0.00  Right Digit (Great Toe)        0 mmHg    0.00           LEFT Lower PVR                Pressures Ratios  Left Posterior Tibial (Ankle) 46 mmHg   0.52  Left Dorsalis Pedis (Ankle)   0 mmHg     0.00  Left Digit (Great Toe)        0 mmHg    0.00                              Left  Brachial Pressure 89 mmHg           [id number] RAHAT Shen MD  Electronically signed by [id number] RAHAT Shen MD on 2/11/2025 at 3:59:10 PM    Inpatient Medications:  Scheduled medications   Medication Dose Route Frequency    cefTRIAXone  2 g intravenous q24h    [Held by provider] celecoxib  200 mg oral Daily    enoxaparin  1 mg/kg subcutaneous q12h    gabapentin  800 mg oral TID    insulin lispro  0-5 Units subcutaneous Before meals & nightly    lidocaine  2 patch transdermal Daily    lisinopril  2.5 mg oral Daily    metroNIDAZOLE  500 mg oral q8h LASHELL    rOPINIRole  0.5 mg oral Nightly    SITagliptin phosphate  100 mg oral Daily    tamsulosin  0.4 mg oral Nightly     PRN medications   Medication    acetaminophen    alum-mag hydroxide-simeth    alum-mag hydroxide-simeth    bisacodyl    cyclobenzaprine    dextrose    dextrose    glucagon    glucagon    lidocaine    loperamide    magnesium hydroxide    polyethylene glycol    sodium phosphates     Continuous Medications   Medication Dose Last Rate        Assessment/Plan   Left Femoral Artery Occlusion   Endorses chronic BLE pain, LLE >RLE. Unable to walk unassisted.   Able to palpate bilateral femoral pulses 2+. Able to detect bilateral PT w/doppler. Bilateral feet are cold to touch, toenails are overgrown.   Vascular US LE Venous Duplex Left 2/10/25 There is acute non-occlusive deep vein thrombosis visualized in the distal external iliac, common femoral and proximal femoral veins. The remainder of the left leg is negative for deep vein thrombosis. There are lymph nodes in the left groin that measure 1.3 cm & 1.2 cm. 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 There is distal flow in the mid-distal femoral artery and the popliteal artery.  TREVOR 2/11/25 Evidence of severe arterial occlusive disease in the bilateral lower extremities at rest. Bilateral DP pulses are  unobtainable. The digits are flatlined.   Discussed with Dr. Jj Murdock, patient is not a good candidate for invasive intervention at this time due to multiple comorbidities and frailty. Recommended to continue Lovenox as tolerated and palliative care.      I spent 35 minutes in the professional and overall care of this patient.     VICTOR M Campbell-CNP  Vascular Surgery  [facility]  [facility]

## 2025-02-12 NOTE — PROGRESS NOTES
" INPATIENT PROGRESS NOTES    PATIENT NAME: Josias Ramirez    MRN: [id number]  SERVICE DATE:  2/12/2025   SERVICE TIME:  1:49 PM    SIGNATURE: Cora Griffith MD         ASSESSMENT :   -Acute descending and sigmoid colitis  -Presents with left-sided abdominal pain and diarrhea  -History of lung cancer, diabetes, PUD, chronic anemia     PLAN:  -Continue ceftriaxone and Flagyl   -C. difficile PCR negative can discontinue p.o. vancomycin  -Closely monitor for antibiotics side effects including rash, Diarrhea/CDI, thrombocytopenia, LAVINIA, etc.  -Can be discharged on cefuroxime and Flagyl through 2/20      SUBJECTIVE  Afebrile  No events overnight       OBJECTIVE  PHYSICAL EXAM:   Patient Vitals for the past 24 hrs:   BP Temp Temp src Pulse Resp SpO2   02/12/25 0800 100/57 36.2 °C (97.2 °F) Temporal 97 17 96 %   02/12/25 0400 97/53 36.6 °C (97.9 °F) Temporal 97 15 94 %   02/12/25 0000 99/55 36.9 °C (98.4 °F) Temporal 100 17 99 %   02/11/25 2000 97/52 36.7 °C (98.1 °F) Temporal 104 18 100 %   02/11/25 1616 109/52 36.5 °C (97.7 °F) Temporal 108 18 96 %   02/11/25 1517 (!) 80/42 -- -- 105 18 99 %   02/11/25 1512 (!) 72/40 -- -- 106 15 99 %   02/11/25 1505 85/53 -- -- -- -- --   02/11/25 1459 (!) 70/40 -- -- 109 18 --   02/11/25 1432 -- -- -- -- -- 97 %         Gen: NAD  GI: Abd soft, Left LLL TTP    Labs:  Lab Results   Component Value Date    WBC 8.5 02/12/2025    HGB 9.0 (L) 02/12/2025    HCT 30.4 (L) 02/12/2025     02/12/2025     02/12/2025     Lab Results   Component Value Date    GLUCOSE 143 (H) 02/12/2025    CALCIUM 7.4 (L) 02/12/2025     02/12/2025    K 4.2 02/12/2025    CO2 21 02/12/2025     (H) 02/12/2025    BUN 33 (H) 02/12/2025    CREATININE 1.22 02/12/2025   ESR: --No results found for: \"SEDRATE\"No results found for: \"CRP\"  Lab Results   Component Value Date    ALT 9 (L) 02/10/2025    AST 18 02/10/2025    ALKPHOS 73 02/10/2025    BILITOT 0.5 02/10/2025         DATA:   Diagnostic tests reviewed " "for today's visit:    Labs this admission reviewed  Imagings this admission reviewed  Cultures: Reviewed        Cora Griffith MD.   Infectious Disease Attending            This note was partially created using voice recognition software and is inherently subject to errors including those of syntax and \"sound-alike\" substitutions which may escape proofreading. In such instances, original meaning may be extrapolated by contextual derivation       "

## 2025-02-12 NOTE — PROGRESS NOTES
Occupational Therapy                 Therapy Communication Note    Patient Name: Josais Ramirez  MRN: [id number]  Department: [facility] 3 S  Room: 3126/3126-A  Today's Date: 2/12/2025     Discipline: Occupational Therapy    Comment: Updated chart review, and it appears that pt is from LTC, and has plans to return when discharged. Will hold OT eval, and complete if/when needed prior to admit.

## 2025-02-12 NOTE — ASSESSMENT & PLAN NOTE
Stool pathogens was negative  Patient still has diarrhea  Will start patient on Imodium  Patient is still on oral vancomycin and Flagyl  ID is following

## 2025-02-13 LAB
ANION GAP SERPL CALC-SCNC: 15 MMOL/L (ref 10–20)
BUN SERPL-MCNC: 30 MG/DL (ref 6–23)
CALCIUM SERPL-MCNC: 7.3 MG/DL (ref 8.6–10.3)
CHLORIDE SERPL-SCNC: 110 MMOL/L (ref 98–107)
CO2 SERPL-SCNC: 22 MMOL/L (ref 21–32)
CREAT SERPL-MCNC: 1.06 MG/DL (ref 0.5–1.3)
EGFRCR SERPLBLD CKD-EPI 2021: 74 ML/MIN/1.73M*2
ERYTHROCYTE [DISTWIDTH] IN BLOOD BY AUTOMATED COUNT: 15.3 % (ref 11.5–14.5)
GLUCOSE BLD MANUAL STRIP-MCNC: 100 MG/DL (ref 74–99)
GLUCOSE BLD MANUAL STRIP-MCNC: 108 MG/DL (ref 74–99)
GLUCOSE BLD MANUAL STRIP-MCNC: 114 MG/DL (ref 74–99)
GLUCOSE BLD MANUAL STRIP-MCNC: 122 MG/DL (ref 74–99)
GLUCOSE BLD MANUAL STRIP-MCNC: 137 MG/DL (ref 74–99)
GLUCOSE SERPL-MCNC: 118 MG/DL (ref 74–99)
HCT VFR BLD AUTO: 31.5 % (ref 41–52)
HGB BLD-MCNC: 9.3 G/DL (ref 13.5–17.5)
HOLD SPECIMEN: NORMAL
MAGNESIUM SERPL-MCNC: 2.25 MG/DL (ref 1.6–2.4)
MCH RBC QN AUTO: 29.5 PG (ref 26–34)
MCHC RBC AUTO-ENTMCNC: 29.5 G/DL (ref 32–36)
MCV RBC AUTO: 100 FL (ref 80–100)
NRBC BLD-RTO: 0 /100 WBCS (ref 0–0)
PLATELET # BLD AUTO: 195 X10*3/UL (ref 150–450)
POTASSIUM SERPL-SCNC: 3.8 MMOL/L (ref 3.5–5.3)
RBC # BLD AUTO: 3.15 X10*6/UL (ref 4.5–5.9)
SODIUM SERPL-SCNC: 143 MMOL/L (ref 136–145)
WBC # BLD AUTO: 5.8 X10*3/UL (ref 4.4–11.3)

## 2025-02-13 PROCEDURE — 2500000004 HC RX 250 GENERAL PHARMACY W/ HCPCS (ALT 636 FOR OP/ED): Performed by: INTERNAL MEDICINE

## 2025-02-13 PROCEDURE — 2500000002 HC RX 250 W HCPCS SELF ADMINISTERED DRUGS (ALT 637 FOR MEDICARE OP, ALT 636 FOR OP/ED): Performed by: NURSE PRACTITIONER

## 2025-02-13 PROCEDURE — 82947 ASSAY GLUCOSE BLOOD QUANT: CPT

## 2025-02-13 PROCEDURE — 85027 COMPLETE CBC AUTOMATED: CPT | Performed by: NURSE PRACTITIONER

## 2025-02-13 PROCEDURE — 80048 BASIC METABOLIC PNL TOTAL CA: CPT | Performed by: NURSE PRACTITIONER

## 2025-02-13 PROCEDURE — 1100000001 HC PRIVATE ROOM DAILY

## 2025-02-13 PROCEDURE — 36415 COLL VENOUS BLD VENIPUNCTURE: CPT | Performed by: NURSE PRACTITIONER

## 2025-02-13 PROCEDURE — 2500000001 HC RX 250 WO HCPCS SELF ADMINISTERED DRUGS (ALT 637 FOR MEDICARE OP): Performed by: PHARMACIST

## 2025-02-13 PROCEDURE — 83735 ASSAY OF MAGNESIUM: CPT | Performed by: NURSE PRACTITIONER

## 2025-02-13 PROCEDURE — 2500000005 HC RX 250 GENERAL PHARMACY W/O HCPCS: Performed by: NURSE PRACTITIONER

## 2025-02-13 PROCEDURE — 2500000004 HC RX 250 GENERAL PHARMACY W/ HCPCS (ALT 636 FOR OP/ED)

## 2025-02-13 PROCEDURE — 99222 1ST HOSP IP/OBS MODERATE 55: CPT

## 2025-02-13 PROCEDURE — 2500000001 HC RX 250 WO HCPCS SELF ADMINISTERED DRUGS (ALT 637 FOR MEDICARE OP): Performed by: NURSE PRACTITIONER

## 2025-02-13 RX ADMIN — ENOXAPARIN SODIUM 50 MG: 60 INJECTION SUBCUTANEOUS at 20:43

## 2025-02-13 RX ADMIN — GABAPENTIN 800 MG: 400 CAPSULE ORAL at 20:43

## 2025-02-13 RX ADMIN — GABAPENTIN 800 MG: 400 CAPSULE ORAL at 09:50

## 2025-02-13 RX ADMIN — METRONIDAZOLE 500 MG: 500 TABLET ORAL at 18:08

## 2025-02-13 RX ADMIN — METRONIDAZOLE 500 MG: 500 TABLET ORAL at 02:35

## 2025-02-13 RX ADMIN — METRONIDAZOLE 500 MG: 500 TABLET ORAL at 09:51

## 2025-02-13 RX ADMIN — GABAPENTIN 800 MG: 400 CAPSULE ORAL at 15:04

## 2025-02-13 RX ADMIN — ENOXAPARIN SODIUM 50 MG: 60 INJECTION SUBCUTANEOUS at 09:51

## 2025-02-13 RX ADMIN — CEFTRIAXONE 2 G: 2 INJECTION, POWDER, FOR SOLUTION INTRAMUSCULAR; INTRAVENOUS at 15:04

## 2025-02-13 RX ADMIN — TAMSULOSIN HYDROCHLORIDE 0.4 MG: 0.4 CAPSULE ORAL at 20:43

## 2025-02-13 RX ADMIN — LIDOCAINE 4% 2 PATCH: 40 PATCH TOPICAL at 09:51

## 2025-02-13 RX ADMIN — ROPINIROLE 0.5 MG: 0.5 TABLET, FILM COATED ORAL at 20:43

## 2025-02-13 RX ADMIN — SITAGLIPTIN 100 MG: 100 TABLET, FILM COATED ORAL at 09:50

## 2025-02-13 ASSESSMENT — COGNITIVE AND FUNCTIONAL STATUS - GENERAL
PERSONAL GROOMING: A LITTLE
STANDING UP FROM CHAIR USING ARMS: A LOT
HELP NEEDED FOR BATHING: A LOT
DAILY ACTIVITIY SCORE: 14
PERSONAL GROOMING: A LOT
DAILY ACTIVITIY SCORE: 13
WALKING IN HOSPITAL ROOM: A LOT
MOBILITY SCORE: 13
DRESSING REGULAR LOWER BODY CLOTHING: A LOT
DRESSING REGULAR UPPER BODY CLOTHING: A LOT
MOBILITY SCORE: 12
EATING MEALS: A LITTLE
MOVING FROM LYING ON BACK TO SITTING ON SIDE OF FLAT BED WITH BEDRAILS: A LITTLE
EATING MEALS: A LITTLE
MOVING TO AND FROM BED TO CHAIR: A LOT
CLIMB 3 TO 5 STEPS WITH RAILING: A LOT
TURNING FROM BACK TO SIDE WHILE IN FLAT BAD: A LOT
STANDING UP FROM CHAIR USING ARMS: A LOT
TURNING FROM BACK TO SIDE WHILE IN FLAT BAD: A LOT
MOVING TO AND FROM BED TO CHAIR: A LOT
TOILETING: A LOT
WALKING IN HOSPITAL ROOM: A LOT
HELP NEEDED FOR BATHING: A LOT
TOILETING: A LOT
DRESSING REGULAR LOWER BODY CLOTHING: A LOT
MOVING FROM LYING ON BACK TO SITTING ON SIDE OF FLAT BED WITH BEDRAILS: A LITTLE
CLIMB 3 TO 5 STEPS WITH RAILING: TOTAL
DRESSING REGULAR UPPER BODY CLOTHING: A LOT

## 2025-02-13 ASSESSMENT — PAIN SCALES - GENERAL
PAINLEVEL_OUTOF10: 0 - NO PAIN
PAINLEVEL_OUTOF10: 0 - NO PAIN

## 2025-02-13 ASSESSMENT — ENCOUNTER SYMPTOMS
FATIGUE: 1
WEAKNESS: 1
SHORTNESS OF BREATH: 1
WOUND: 1
ENDOCRINE NEGATIVE: 1
UNEXPECTED WEIGHT CHANGE: 1
APPETITE CHANGE: 1
NUMBNESS: 1
PSYCHIATRIC NEGATIVE: 1
EYES NEGATIVE: 1

## 2025-02-13 NOTE — PROGRESS NOTES
" INPATIENT PROGRESS NOTES    PATIENT NAME: Josias Ramirez    MRN: [id number]  SERVICE DATE:  2/13/2025   SERVICE TIME:  12:51 PM    SIGNATURE: Cora Griffith MD         ASSESSMENT :   -Acute descending and sigmoid colitis  -Presents with left-sided abdominal pain and diarrhea  -Negative C. difficile PCR  -History of lung cancer, diabetes, PUD, chronic anemia     PLAN:  -Continue ceftriaxone and Flagyl   -Closely monitor for antibiotics side effects including rash, Diarrhea/CDI, thrombocytopenia, LAVINIA, etc.  -Can be discharged on cefuroxime and Flagyl through 2/20          SUBJECTIVE  Afebrile  No events overnight       OBJECTIVE  PHYSICAL EXAM:   Patient Vitals for the past 24 hrs:   BP Temp Temp src Pulse Resp SpO2   02/13/25 1200 133/80 36.9 °C (98.4 °F) Temporal 105 18 99 %   02/13/25 1040 97/55 36.7 °C (98.1 °F) Temporal 103 16 94 %   02/13/25 0400 89/54 36.8 °C (98.2 °F) Temporal 95 18 97 %   02/12/25 2000 92/53 36.4 °C (97.5 °F) Temporal 96 16 97 %   02/12/25 1600 105/56 36.3 °C (97.3 °F) Temporal 97 16 96 %         Gen: NAD  GI: Abd soft, Left LLL TTP    Labs:  Lab Results   Component Value Date    WBC 5.8 02/13/2025    HGB 9.3 (L) 02/13/2025    HCT 31.5 (L) 02/13/2025     02/13/2025     02/13/2025     Lab Results   Component Value Date    GLUCOSE 118 (H) 02/13/2025    CALCIUM 7.3 (L) 02/13/2025     02/13/2025    K 3.8 02/13/2025    CO2 22 02/13/2025     (H) 02/13/2025    BUN 30 (H) 02/13/2025    CREATININE 1.06 02/13/2025   ESR: --No results found for: \"SEDRATE\"No results found for: \"CRP\"  Lab Results   Component Value Date    ALT 9 (L) 02/10/2025    AST 18 02/10/2025    ALKPHOS 73 02/10/2025    BILITOT 0.5 02/10/2025         DATA:   Diagnostic tests reviewed for today's visit:    Labs this admission reviewed  Imagings this admission reviewed  Cultures: Reviewed        Cora Griffith MD.   Infectious Disease Attending            This note was partially created using voice recognition " "software and is inherently subject to errors including those of syntax and \"sound-alike\" substitutions which may escape proofreading. In such instances, original meaning may be extrapolated by contextual derivation       "

## 2025-02-13 NOTE — PROGRESS NOTES
"Nutrition Initial Assessment:   Nutrition Assessment         Patient is a 74 y.o. male from [facility] presenting with abdominal pain.     2/13/2025 Follow Up: Nutrition consulted again. Patient is a picky eater and admits to this. He has been fearful about drinking the oral nutrition supplements due to his diarrhea - but, usually loves the strawberry ones. Spoke with his sister yesterday and palliative care and nursing today along with the patient again. Menu preferences discussed and planned for the all meals. Pt agreeable to Zenia, Grilled Cheese, Hamburger (no cheese), Mashed Potatoes with butter, Diet Pepsi, Orange Juice, Orange Sherbet, Vanilla Ice Cream and Lemon Pie, maybe fruit and maybe jello - and that's it. He cannot tolerate spicy or seasoned foods as this causes what he describes as \"heartburn\". His sister brought him [employer] Roast Beef and Pizza when he lived in the [facility], but he has not had this recently.    Past medical history of squamous cell carcinoma, right side parotid adenopathy, adenocarcinoma left lung with chemo/radiation, NIDDM, anemia, HLD, PUD, vitamin B 12 deficiency, pedal edema     Nutrition History:  Food and Nutrient History: Pt from [facility] ([phone]), but no one is available to comment on his eating history. According to our records, pt has lost 15 lbs (11%) since we last saw him in November 2024.       Anthropometrics:  Height: 180.3 cm (5' 11\")   Weight: 54.8 kg (120 lb 12.8 oz)   BMI (Calculated): 16.86  IBW/kg (Dietitian Calculated): 78.02 kg  Percent of IBW: 70.23 %       Weight History:   Wt Readings from Last 10 Encounters:   02/10/25 54.8 kg (120 lb 12.8 oz)   12/05/24 54.9 kg (121 lb)   11/22/24 59.7 kg (131 lb 9.8 oz)   11/19/24 61.2 kg (134 lb 14.7 oz)   11/14/24 61.2 kg (135 lb)   11/03/24 67.1 kg (148 lb)       Weight Change %:  Weight History / % Weight Change: 15 lbs (11%) in 3 months  Significant Weight Loss: Yes    Nutrition Focused " Physical Exam Findings:    Subcutaneous Fat Loss:   Orbital Fat Pads: Mild-Moderate (slight dark circles and slight hollowing)  Buccal Fat Pads: Mild-Moderate (flat cheeks, minimal bounce)  Triceps: Severe (negligible fat tissue)  Ribs: Mild-Moderate (ribs apparent, depressions between them less pronounced, iliac crest somewhat prominent)  Muscle Wasting:  Temporalis: Severe (hollowed scooping depression)  Pectoralis (Clavicular Region): Severe (protruding prominent clavicle)  Deltoid/Trapezius: Severe (squared shoulders, acromion process prominent)  Interosseous: Severe (depressed area between thumb and forefinger)  Trapezius/Infraspinatus/Supraspinatus (Scapular Region): Defer  Quadriceps: Defer  Gastrocnemius: Defer  Edema:  Edema: none  Physical Findings:  Skin: Positive  Positive Skin Findings: Pressure injury stage 3  Digestive System Findings: Loose stool    Nutrition Significant Labs:  CBC Trend:   Results from last 7 days   Lab Units 02/13/25  0535 02/12/25  0658 02/11/25  0601 02/10/25  0232   WBC AUTO x10*3/uL 5.8 8.5 6.1 7.3   RBC AUTO x10*6/uL 3.15* 3.03* 3.00* 3.26*   HEMOGLOBIN g/dL 9.3* 9.0* 9.1* 9.8*   HEMATOCRIT % 31.5* 30.4* 29.8* 32.3*   MCV fL 100 100 99 99   PLATELETS AUTO x10*3/uL 195 201 209 207    , BMP Trend:   Results from last 7 days   Lab Units 02/13/25  0535 02/12/25  0658 02/11/25  0601 02/10/25  0442 02/10/25  0232   GLUCOSE mg/dL 118* 143* 121*  --  99   CALCIUM mg/dL 7.3* 7.4* 7.3*  --  7.6*   SODIUM mmol/L 143 141 140  --  136   POTASSIUM mmol/L 3.8 4.2 4.4   < > 5.6*   CO2 mmol/L 22 21 22  --  25   CHLORIDE mmol/L 110* 108* 106  --  103   BUN mg/dL 30* 33* 33*  --  34*   CREATININE mg/dL 1.06 1.22 1.12  --  1.21    < > = values in this interval not displayed.    , TPN/PPN Labs:   Results from last 7 days   Lab Units 02/13/25  0535 02/12/25  0658 02/11/25  0601 02/10/25  0442 02/10/25  0232   GLUCOSE mg/dL 118* 143* 121*  --  99   POTASSIUM mmol/L 3.8 4.2 4.4   < > 5.6*   MAGNESIUM  "mg/dL 2.25 2.21 2.12   < >  --    SODIUM mmol/L 143 141 140  --  136   CHLORIDE mmol/L 110* 108* 106  --  103   ALT U/L  --   --   --   --  9*   AST U/L  --   --   --   --  18   ALK PHOS U/L  --   --   --   --  73   BILIRUBIN TOTAL mg/dL  --   --   --   --  0.5    < > = values in this interval not displayed.        Nutrition Specific Medications:  Scheduled medications  cefTRIAXone, 2 g, intravenous, q24h  [Held by provider] celecoxib, 200 mg, oral, Daily  enoxaparin, 1 mg/kg, subcutaneous, q12h  gabapentin, 800 mg, oral, TID  insulin lispro, 0-5 Units, subcutaneous, Before meals & nightly  lidocaine, 2 patch, transdermal, Daily  lisinopril, 2.5 mg, oral, Daily  metroNIDAZOLE, 500 mg, oral, q8h LASHELL  rOPINIRole, 0.5 mg, oral, Nightly  SITagliptin phosphate, 100 mg, oral, Daily  tamsulosin, 0.4 mg, oral, Nightly      Continuous medications     PRN medications  PRN medications: acetaminophen, alum-mag hydroxide-simeth, alum-mag hydroxide-simeth, bisacodyl, cyclobenzaprine, dextrose, dextrose, glucagon, glucagon, lidocaine, loperamide, magnesium hydroxide, polyethylene glycol, sodium phosphates      I/O:   Last BM Date: 02/13/25; Stool Appearance: Loose (02/12/25 1018)    Dietary Orders (From admission, onward)       Start     Ordered    02/11/25 1021  Oral nutritional supplements  Until discontinued        Question Answer Comment   Deliver with All meals    Select supplement: Ensure Plus High Protein       Placed in \"And\" Linked Group    02/11/25 1021    02/11/25 1021  Oral nutritional supplements  Until discontinued        Question Answer Comment   Deliver with Dinner    Deliver with Lunch    Select supplement: Magic Cup       Placed in \"And\" Linked Group    02/11/25 1021    02/10/25 1315  May Participate in Room Service  ( ROOM SERVICE MAY PARTICIPATE)  Once        Question:  .  Answer:  Yes    02/10/25 1314    02/10/25 1300  Adult diet Consistent Carb; CCD 60 gm/meal  Diet effective now        Question Answer " Comment   Diet type Consistent Carb    Carb diet selection: CCD 60 gm/meal        02/10/25 1300                     Estimated Needs:   Total Energy Estimated Needs in 24 hours (kCal):  (3580-3417 kcal (30-35 kcal/kg))     Total Protein Estimated Needs in 24 Hours (g):  (69-82g protein (1.25-1.5g/kg))     Total Fluid Estimated Needs in 24 Hours (mL):  (4408-8192 mL (1mL/kcal))           Nutrition Diagnosis   Malnutrition Diagnosis  Patient has Malnutrition Diagnosis: Yes  Diagnosis Status: New  Malnutrition Diagnosis: Severe malnutrition related to chronic disease or condition  As Evidenced by: significant weight loss of 15 lbs (11%) within 3 months and decreased oral intakes of <75% of est nutritional needs for >1 month.  Additional Assessment Information: Also pressure injury of stage 3 to sacrum    Nutrition Diagnosis  Patient has Nutrition Diagnosis: Yes  Diagnosis Status (1): Active  Nutrition Diagnosis 1: Underweight  Related to (1): inadequate oral intakes and chronic nutritional stress  As Evidenced by (1): BMI 16       Nutrition Interventions/Recommendations        Nutrition Recommendations:  Individualized Nutrition Prescription Provided for : Suggest liberalized diet - REGULAR - to encourage intakes. Supplement with ENSURE PLUS HP with meals and MAGIC CUPS to meals.    Nutrition Interventions/Goals:   Interventions: Meals and snacks, Medical food supplement, Feeding assistance management  Meals and Snacks: General healthful diet  Medical Food Supplement: Commercial beverage medical food supplement therapy  Goal: ENSURE PLUS HP = 350 kcal/20g protein, MAGIC CUPS = 290 kcal/9g protein      Education Documentation  Nutrition Related Education, taught by Ann Adams RDN, [facility] at 2/13/2025  3:22 PM.  Learner: Patient  Readiness: Acceptance  Method: Explanation  Response: Verbalizes Understanding  Comment: Encouraged pt to eat. Reviewed menu and pt participated in selecting foods he thought he could eat.  Preferences documented and will send as ordered.              Nutrition Monitoring and Evaluation   Food/Nutrient Related History Monitoring  Monitoring and Evaluation Plan: Estimated Energy Intake, Intake / amount of food  Intake / Amount of food: Consumes at least 75% or more of meals/snacks/supplements    Anthropometric Measurements  Monitoring and Evaluation Plan: Body weight  Body Weight: Body weight - Promote weight restoration         Physical Exam Findings  Monitoring and Evaluation Plan: Skin  Skin Finding: Imparied wound healing - Skin to heal    Goal Status: Some progress toward goal(s)    Time Spent (min): 30 minutes

## 2025-02-13 NOTE — PROGRESS NOTES
Spiritual Care Visit  Spiritual Care Request    Reason for Visit:        Request Received From:       Focus of Care:  Visited With: Patient not available         Refer to :          Spiritual Care Assessment    Spiritual Assessment:                      Care Provided:       Sense of Community and or Y[employer] Affiliation:  [employer]         Addressed Needs/Concerns and/or Emir Through:          Outcome:        Advance Directives:         Spiritual Care Annotation    Annotation:  He was busy when I visited. I'll try again.

## 2025-02-13 NOTE — CARE PLAN
The clinical goals for the shift include remain free from falls this shift      Problem: Pain - Adult  Goal: Verbalizes/displays adequate comfort level or baseline comfort level  Outcome: Progressing  Flowsheets (Taken 2/13/2025 1543)  Verbalizes/displays adequate comfort level or baseline comfort level:   Encourage patient to monitor pain and request assistance   Assess pain using appropriate pain scale   Administer analgesics based on type and severity of pain and evaluate response   Implement non-pharmacological measures as appropriate and evaluate response   Consider cultural and social influences on pain and pain management     Problem: Safety - Adult  Goal: Free from fall injury  Outcome: Progressing  Flowsheets (Taken 2/13/2025 1543)  Free from fall injury:   Instruct family/caregiver on patient safety   Based on caregiver fall risk screen, instruct family/caregiver to ask for assistance with transferring infant if caregiver noted to have fall risk factors     Problem: Discharge Planning  Goal: Discharge to home or other facility with appropriate resources  Outcome: Progressing  Flowsheets (Taken 2/13/2025 1543)  Discharge to home or other facility with appropriate resources:   Identify barriers to discharge with patient and caregiver   Arrange for needed discharge resources and transportation as appropriate   Identify discharge learning needs (meds, wound care, etc)   Arrange for interpreters to assist at discharge as needed   Refer to discharge planning if patient needs post-hospital services based on physician order or complex needs related to functional status, cognitive ability or social support system     Problem: Chronic Conditions and Co-morbidities  Goal: Patient's chronic conditions and co-morbidity symptoms are monitored and maintained or improved  Outcome: Progressing  Flowsheets (Taken 2/13/2025 1543)  Care Plan - Patient's Chronic Conditions and Co-Morbidity Symptoms are Monitored and  Maintained or Improved:   Monitor and assess patient's chronic conditions and comorbid symptoms for stability, deterioration, or improvement   Collaborate with multidisciplinary team to address chronic and comorbid conditions and prevent exacerbation or deterioration   Update acute care plan with appropriate goals if chronic or comorbid symptoms are exacerbated and prevent overall improvement and discharge     Problem: Nutrition  Goal: Nutrient intake appropriate for maintaining nutritional needs  Outcome: Progressing     Problem: Skin  Goal: Decreased wound size/increased tissue granulation at next dressing change  Outcome: Progressing  Flowsheets (Taken 2/13/2025 1543)  Decreased wound size/increased tissue granulation at next dressing change:   Promote sleep for wound healing   Protective dressings over bony prominences  Goal: Participates in plan/prevention/treatment measures  Outcome: Progressing  Flowsheets (Taken 2/13/2025 1543)  Participates in plan/prevention/treatment measures:   Discuss with provider PT/OT consult   Elevate heels   Increase activity/out of bed for meals  Goal: Prevent/manage excess moisture  Outcome: Progressing  Flowsheets (Taken 2/13/2025 1543)  Prevent/manage excess moisture:   Cleanse incontinence/protect with barrier cream   Moisturize dry skin   Follow provider orders for dressing changes   Monitor for/manage infection if present  Goal: Prevent/minimize sheer/friction injuries  Outcome: Progressing  Flowsheets (Taken 2/13/2025 1543)  Prevent/minimize sheer/friction injuries:   Complete micro-shifts as needed if patient unable. Adjust patient position to relieve pressure points, not a full turn   Increase activity/out of bed for meals   Use pull sheet   HOB 30 degrees or less   Turn/reposition every 2 hours/use positioning/transfer devices  Goal: Promote/optimize nutrition  Outcome: Progressing  Flowsheets (Taken 2/13/2025 1543)  Promote/optimize nutrition:   Assist with feeding    Monitor/record intake including meals   Consume > 50% meals/supplements   Offer water/supplements/favorite foods   Discuss with provider if NPO > 2 days   Reassess MST if dietician not consulted  Goal: Promote skin healing  Outcome: Progressing  Flowsheets (Taken 2/13/2025 1543)  Promote skin healing:   Assess skin/pad under line(s)/device(s)   Protective dressings over bony prominences   Turn/reposition every 2 hours/use positioning/transfer devices     Problem: Fall/Injury  Goal: Not fall by end of shift  Outcome: Progressing  Goal: Be free from injury by end of the shift  Outcome: Progressing  Goal: Verbalize understanding of personal risk factors for fall in the hospital  Outcome: Progressing  Goal: Verbalize understanding of risk factor reduction measures to prevent injury from fall in the home  Outcome: Progressing  Goal: Use assistive devices by end of the shift  Outcome: Progressing  Goal: Pace activities to prevent fatigue by end of the shift  Outcome: Progressing     Problem: Diabetes  Goal: Achieve decreasing blood glucose levels by end of shift  Outcome: Progressing  Goal: Increase stability of blood glucose readings by end of shift  Outcome: Progressing  Flowsheets (Taken 2/13/2025 2252)  Increase stability of blood glucose readings by end of shift: Med administration/monitoring of effect  Goal: Maintain electrolyte levels within acceptable range throughout shift  Outcome: Progressing  Flowsheets (Taken 2/13/2025 6643)  Maintain electrolyte levels within acceptable range throughout shift:   Med administration/monitoring of effect   Monitor urine output  Goal: Maintain glucose levels >70mg/dl to <250mg/dl throughout shift  Outcome: Progressing  Flowsheets (Taken 2/13/2025 7045)  Maintain glucose levels >70mg/dl to <250mg/dl throughout shift: Med administration/monitoring of effect  Goal: No changes in neurological exam by end of shift  Outcome: Progressing  Goal: Learn about and adhere to nutrition  recommendations by end of shift  Outcome: Progressing  Flowsheets (Taken 2/13/2025 4243)  Learn about and adhere to nutrition recommendations by end of shift: Ensure/encourage compliance with appropriate diet  Goal: Vital signs within normal range for age by end of shift  Outcome: Progressing  Flowsheets (Taken 2/13/2025 5219)  Vital signs within normal range for age by end of shift: Med administration/monitoring of effect  Goal: Increase self care and/or family involovement by end of shift  Outcome: Progressing  Goal: Receive DSME education by end of shift  Outcome: Progressing  Flowsheets (Taken 2/13/2025 6117)  Receive DSME education by end of shift: Provide patient centered education on Diabetic Self Management Education     Problem: Pain  Goal: Takes deep breaths with improved pain control throughout the shift  Outcome: Progressing  Goal: Turns in bed with improved pain control throughout the shift  Outcome: Progressing  Goal: Walks with improved pain control throughout the shift  Outcome: Progressing  Goal: Performs ADL's with improved pain control throughout shift  Outcome: Progressing  Goal: Participates in PT with improved pain control throughout the shift  Outcome: Progressing  Goal: Free from opioid side effects throughout the shift  Outcome: Progressing  Goal: Free from acute confusion related to pain meds throughout the shift  Outcome: Progressing

## 2025-02-13 NOTE — PROGRESS NOTES
Internal Medicine Progress Note    Patient Name: Josias Ramirez          MRN: [id number]  Today's Date: February 13, 2025          Attending: Derrell Olvera MD    Subjective:  Patient was seen and examined at bedside, patient lays down in bed, patient looks comfortable,     Review Of Systems:  GENERAL: No malaise or fevers.  CARDIOVASCULAR: Negative for chest pain  RESPIRATORY: Negative for shortness of breath  GI: No nausea, vomiting, or diarrhea    Objective:  Vitals:    02/12/25 2000 02/13/25 0400 02/13/25 1040 02/13/25 1200   BP: 92/53 89/54 97/55 133/80   BP Location: Left arm Left arm Left arm    Patient Position: Lying Lying     Pulse: 96 95 103 105   Resp: 16 18 16 18   Temp: 36.4 °C (97.5 °F) 36.8 °C (98.2 °F) 36.7 °C (98.1 °F) 36.9 °C (98.4 °F)   TempSrc: Temporal Temporal Temporal Temporal   SpO2: 97% 97% 94% 99%   Weight:       Height:               Physical Exam:   General appearance: Thin, alert, in no acute distress   Lungs: Clear to auscultation, no wheezing or rhonchi  Heart: RRR without murmur, gallop, or rubs.   Abdomen: Soft, non-tender. Bowel sounds normal.  Extremities: No deformity, no edema  Neuro: Alert oriented x3, no focal deficit.    Labs:  Results for orders placed or performed during the hospital encounter of 02/10/25 (from the past 24 hours)   POCT GLUCOSE   Result Value Ref Range    POCT Glucose 128 (H) 74 - 99 mg/dL   Urinalysis with Reflex Culture and Microscopic   Result Value Ref Range    Color, Urine Yellow Light-Yellow, Yellow, Dark-Yellow    Appearance, Urine Clear Clear    Specific Gravity, Urine 1.044 (N) 1.005 - 1.035    pH, Urine 5.5 5.0, 5.5, 6.0, 6.5, 7.0, 7.5, 8.0    Protein, Urine 20 (TRACE) NEGATIVE, 10 (TRACE), 20 (TRACE) mg/dL    Glucose, Urine Normal Normal mg/dL    Blood, Urine 0.03 (TRACE) (A) NEGATIVE mg/dL    Ketones, Urine TRACE (A) NEGATIVE mg/dL    Bilirubin, Urine NEGATIVE NEGATIVE mg/dL    Urobilinogen, Urine Normal Normal mg/dL    Nitrite, Urine 1+ (A) NEGATIVE     Leukocyte Esterase, Urine 25 [place]/uL (A) NEGATIVE   Extra Urine Gray Tube   Result Value Ref Range    Extra Tube Hold for add-ons.    Microscopic Only, Urine   Result Value Ref Range    WBC, Urine 6-10 (A) 1-5, NONE /HPF    RBC, Urine 3-5 NONE, 1-2, 3-5 /HPF    Squamous Epithelial Cells, Urine 1-9 (SPARSE) Reference range not established. /HPF    Bacteria, Urine 4+ (A) NONE SEEN /HPF   POCT GLUCOSE   Result Value Ref Range    POCT Glucose 135 (H) 74 - 99 mg/dL   CBC   Result Value Ref Range    WBC 5.8 4.4 - 11.3 x10*3/uL    nRBC 0.0 0.0 - 0.0 /100 WBCs    RBC 3.15 (L) 4.50 - 5.90 x10*6/uL    Hemoglobin 9.3 (L) 13.5 - 17.5 g/dL    Hematocrit 31.5 (L) 41.0 - 52.0 %     80 - 100 fL    MCH 29.5 26.0 - 34.0 pg    MCHC 29.5 (L) 32.0 - 36.0 g/dL    RDW 15.3 (H) 11.5 - 14.5 %    Platelets 195 150 - 450 x10*3/uL   Basic Metabolic Panel   Result Value Ref Range    Glucose 118 (H) 74 - 99 mg/dL    Sodium 143 136 - 145 mmol/L    Potassium 3.8 3.5 - 5.3 mmol/L    Chloride 110 (H) 98 - 107 mmol/L    Bicarbonate 22 21 - 32 mmol/L    Anion Gap 15 10 - 20 mmol/L    Urea Nitrogen 30 (H) 6 - 23 mg/dL    Creatinine 1.06 0.50 - 1.30 mg/dL    eGFR 74 >60 mL/min/1.73m*2    Calcium 7.3 (L) 8.6 - 10.3 mg/dL   Magnesium   Result Value Ref Range    Magnesium 2.25 1.60 - 2.40 mg/dL   POCT GLUCOSE   Result Value Ref Range    POCT Glucose 114 (H) 74 - 99 mg/dL   POCT GLUCOSE   Result Value Ref Range    POCT Glucose 137 (H) 74 - 99 mg/dL       Medications:  Scheduled medications  cefTRIAXone, 2 g, intravenous, q24h  [Held by provider] celecoxib, 200 mg, oral, Daily  enoxaparin, 1 mg/kg, subcutaneous, q12h  gabapentin, 800 mg, oral, TID  insulin lispro, 0-5 Units, subcutaneous, Before meals & nightly  lidocaine, 2 patch, transdermal, Daily  lisinopril, 2.5 mg, oral, Daily  metroNIDAZOLE, 500 mg, oral, q8h LASHELL  rOPINIRole, 0.5 mg, oral, Nightly  SITagliptin phosphate, 100 mg, oral, Daily  tamsulosin, 0.4 mg, oral, Nightly      Continuous  "medications     PRN medications  PRN medications: acetaminophen, alum-mag hydroxide-simeth, alum-mag hydroxide-simeth, bisacodyl, cyclobenzaprine, dextrose, dextrose, glucagon, glucagon, lidocaine, loperamide, magnesium hydroxide, polyethylene glycol, sodium phosphates      Assessment/Plan:  Assessment & Plan  Acute colitis  Improving  Continue Rocephin and Flagyl  Type 2 diabetes mellitus  Sliding scale insulin  Monitor blood sugar  Nonmelanoma skin cancer  Patient has squamous cell carcinoma on the nose  Mixed hyperlipidemia  Continue current medications  DVT (deep venous thrombosis) (Multi)  Continue anticoagulation  Hematology is following  Chronic anemia  Hemoglobin stable  Monitor blood count  Transfuse if hemoglobin drops below 7  Pleural effusion on left  Continue monitoring    Discussed with patient, RN    Derrell Olvera MD   Date: 02/13/25  Time: 1:43 PM    This note was partially created using voice recognition software and is inherently subject to errors including those of syntax and \"sound-alike\" substitutions which may escape proofreading. In such instances, original meaning may be extrapolated by contextual derivation    "

## 2025-02-13 NOTE — CONSULTS
Consults    Reason For Consult  Reason for Consult: communication / medical decision making, patient/family support, and goals of care in the presence of advancing disease     History Of Present Illness  Josias Ramirez is a 74 y.o. male with past medical history of squamous cell carcinoma, right side parotid adenopathy, adenocarcinoma left lung with chemo/radiation, NIDDM, anemia, HLD, PUD, vitamin B 12 deficiency, pedal edema presenting with abdominal pain, persistent diarrhea, new pain in the left side radiating to the back.      Symptoms (0 - 10, Best to Worst)  [place] Symptom Assessment System  0-10 (Numeric) Pain Score: 0 - No pain    BM in last 48 hours? yes    Emotional/Psychological/Spiritual Needs  Over the past two weeks, how often has the patient been bothered by having little interest or pleasure in doing things?  occurrence (last two weeks): more than half the days    Over the past two weeks, how often has the patient been bothered by feeling down, depressed, or hopeless?  occurrence (last two weeks): more than half the days    Screening for spiritual needs?  Yes  Synagogue and importance of Spiritism: yes  Source for spiritual support/meaning: [employer]-wishes visits from  and communion    Spiritual Hx:  Are you spiritual or Confucianism?   What’s your constantin background?  During difficult times in your life, what have you relied on for strength?    Music Hx:   Do you enjoy music? What type of music do you enjoy?       Personal/Social History   He reports that he has quit smoking. His smoking use included cigarettes. He has never used smokeless tobacco. He reports that he does not drink alcohol and does not use drugs.    Functional Status        Caregiving/Caregiver Support  Does the patient require assistance in some or all components of his care, including coordination of medical care? Yes  If Yes, which person serves that role?  sister   Caregiver emotional or practical needs:      Past Medical  History  He has a past medical history of Adenocarcinoma of left lung (Multi), Cancer (Multi), Cataracts, bilateral, Chronic anemia, Metastasis (Multi), Mixed hyperlipidemia, Nonmelanoma skin cancer, Parotid lymphadenopathy, Pedal edema, Peptic ulcer disease, Recurrent falls, Squamous cell carcinoma of nose, Tobacco use disorder, Type 2 diabetes mellitus, and Vitamin B12 deficiency.    Surgical History  He has a past surgical history that includes Sinus surgery; Pilonidal cyst drainage; Skin biopsy; and Other surgical history.     Family History  Family History   Problem Relation Name Age of Onset    Other (Diabetes mellitus) Mother      Other (Diabetes mellitus) Sister      Other (Liver transplant) Brother       Allergies  Penicillins and Tramadol    Review of Systems   Constitutional:  Positive for appetite change, fatigue and unexpected weight change.   HENT: Negative.     Eyes: Negative.    Respiratory:  Positive for shortness of breath.    Endocrine: Negative.    Genitourinary: Negative.    Musculoskeletal:  Positive for gait problem.   Skin:  Positive for wound.   Allergic/Immunologic: Positive for immunocompromised state.   Neurological:  Positive for weakness and numbness.   Psychiatric/Behavioral: Negative.          Physical Exam  Constitutional:       Appearance: He is ill-appearing and toxic-appearing.   HENT:      Head: Normocephalic.      Right Ear: External ear normal.      Left Ear: External ear normal.      Nose: Nose normal.      Mouth/Throat:      Mouth: Mucous membranes are dry.   Eyes:      Conjunctiva/sclera: Conjunctivae normal.   Cardiovascular:      Rate and Rhythm: Normal rate.      Pulses: Normal pulses.   Pulmonary:      Effort: Pulmonary effort is normal.   Abdominal:      General: Abdomen is flat.      Palpations: Abdomen is soft.   Musculoskeletal:         General: Normal range of motion.      Cervical back: Rigidity present.   Skin:     General: Skin is dry.      Capillary Refill:  "Capillary refill takes less than 2 seconds.   Neurological:      Mental Status: He is alert. Mental status is at baseline.      Motor: Weakness present.      Gait: Gait abnormal.         Last Recorded Vitals  Blood pressure 89/54, pulse 95, temperature 36.8 °C (98.2 °F), temperature source Temporal, resp. rate 18, height 1.803 m (5' 11\"), weight 54.8 kg (120 lb 12.8 oz), SpO2 97%.    Relevant Results  cefTRIAXone, 2 g, intravenous, q24h  [Held by provider] celecoxib, 200 mg, oral, Daily  enoxaparin, 1 mg/kg, subcutaneous, q12h  gabapentin, 800 mg, oral, TID  insulin lispro, 0-5 Units, subcutaneous, Before meals & nightly  lidocaine, 2 patch, transdermal, Daily  lisinopril, 2.5 mg, oral, Daily  metroNIDAZOLE, 500 mg, oral, q8h LASHELL  rOPINIRole, 0.5 mg, oral, Nightly  SITagliptin phosphate, 100 mg, oral, Daily  tamsulosin, 0.4 mg, oral, Nightly       Results for orders placed or performed during the hospital encounter of 02/10/25 (from the past 24 hours)   POCT GLUCOSE   Result Value Ref Range    POCT Glucose 136 (H) 74 - 99 mg/dL   POCT GLUCOSE   Result Value Ref Range    POCT Glucose 128 (H) 74 - 99 mg/dL   Urinalysis with Reflex Culture and Microscopic   Result Value Ref Range    Color, Urine Yellow Light-Yellow, Yellow, Dark-Yellow    Appearance, Urine Clear Clear    Specific Gravity, Urine 1.044 (N) 1.005 - 1.035    pH, Urine 5.5 5.0, 5.5, 6.0, 6.5, 7.0, 7.5, 8.0    Protein, Urine 20 (TRACE) NEGATIVE, 10 (TRACE), 20 (TRACE) mg/dL    Glucose, Urine Normal Normal mg/dL    Blood, Urine 0.03 (TRACE) (A) NEGATIVE mg/dL    Ketones, Urine TRACE (A) NEGATIVE mg/dL    Bilirubin, Urine NEGATIVE NEGATIVE mg/dL    Urobilinogen, Urine Normal Normal mg/dL    Nitrite, Urine 1+ (A) NEGATIVE    Leukocyte Esterase, Urine 25 [place]/uL (A) NEGATIVE   Extra Urine Gray Tube   Result Value Ref Range    Extra Tube Hold for add-ons.    Microscopic Only, Urine   Result Value Ref Range    WBC, Urine 6-10 (A) 1-5, NONE /HPF    RBC, Urine 3-5 " NONE, 1-2, 3-5 /HPF    Squamous Epithelial Cells, Urine 1-9 (SPARSE) Reference range not established. /HPF    Bacteria, Urine 4+ (A) NONE SEEN /HPF   POCT GLUCOSE   Result Value Ref Range    POCT Glucose 135 (H) 74 - 99 mg/dL   CBC   Result Value Ref Range    WBC 5.8 4.4 - 11.3 x10*3/uL    nRBC 0.0 0.0 - 0.0 /100 WBCs    RBC 3.15 (L) 4.50 - 5.90 x10*6/uL    Hemoglobin 9.3 (L) 13.5 - 17.5 g/dL    Hematocrit 31.5 (L) 41.0 - 52.0 %     80 - 100 fL    MCH 29.5 26.0 - 34.0 pg    MCHC 29.5 (L) 32.0 - 36.0 g/dL    RDW 15.3 (H) 11.5 - 14.5 %    Platelets 195 150 - 450 x10*3/uL   Basic Metabolic Panel   Result Value Ref Range    Glucose 118 (H) 74 - 99 mg/dL    Sodium 143 136 - 145 mmol/L    Potassium 3.8 3.5 - 5.3 mmol/L    Chloride 110 (H) 98 - 107 mmol/L    Bicarbonate 22 21 - 32 mmol/L    Anion Gap 15 10 - 20 mmol/L    Urea Nitrogen 30 (H) 6 - 23 mg/dL    Creatinine 1.06 0.50 - 1.30 mg/dL    eGFR 74 >60 mL/min/1.73m*2    Calcium 7.3 (L) 8.6 - 10.3 mg/dL   Magnesium   Result Value Ref Range    Magnesium 2.25 1.60 - 2.40 mg/dL   POCT GLUCOSE   Result Value Ref Range    POCT Glucose 114 (H) 74 - 99 mg/dL    Vascular US Ankle Brachial Index (TREVOR) Without Exercise    Result Date: 2/11/2025            [facility] [address] [id number]     Tel [phone] Fax [phone]  Vascular Lab Report  [facility] US ANKLE BRACHIAL INDEX (TREVOR) WITHOUT EXERCISE Patient Name:      JAMEY Crews Physician:  [id number] RAHAT Shen MD Study Date:        2/11/2025            Ordering Provider:  [id number] CHETNA ECHEVERRIA MRN/PID:           [id number]             Fellow: Accession#:        [id number]         Technologist:       Ysabel Herndon RVT, [facility] Date of Birth/Age: 1950 / 74 years Technologist 2: Gender:             M                    Encounter#:         [id number] Admission Status:  Inpatient            Location Performed: [facility]  Diagnosis/ICD: Unspecified (asymptomatic) atherosclerosis of native arteries of                extremities, left leg-I70.202; Peripheral vascular disease,                unspecified-I73.9 Indication:    LSFA occlusion on duplex CPT Codes:     36114 Peripheral artery TREVOR Only  Pertinent History: Hyperlipidemia and Previous DVT. Left prox/mid SFA occlusion                    on duplex.                    There is a right upper arm IV line. BP is not taken here.  **CRITICAL RESULT** Critical Result: Severe ABIs. Notification called to ARTEMIO Hdz on 2/11/2025 at 12:52:15 PM. Acknowledged critical results notification communicated via secure chat by INÉS Murray RDMST.  CONCLUSIONS: Right Lower PVR: Evidence of severe arterial occlusive disease in the right lower extremity at rest. Monophasic flow is noted in the right popliteal artery and right posterior tibial artery. Multiphasic flow is noted in the right common femoral artery. The right DP pulse is unobtainable. The digit is flatlined. Left Lower PVR: Evidence of severe arterial occlusive disease in the left lower extremity at rest. Monophasic flow is noted in the left popliteal artery and left posterior tibial artery. Multiphasic flow is noted in the left common femoral artery. The right DP pulse is unobtainable. The digit is flatlined.  Imaging & Doppler Findings:  RIGHT Lower PVR                Pressures Ratios Right Posterior Tibial (Ankle) 23 mmHg   0.26 Right Dorsalis Pedis (Ankle)   0 mmHg    0.00 Right Digit (Great Toe)        0 mmHg    0.00   LEFT Lower PVR                Pressures Ratios Left Posterior Tibial (Ankle) 46 mmHg   0.52 Left Dorsalis Pedis (Ankle)   0 mmHg    0.00 Left Digit (Great Toe)        0 mmHg    0.00                      Left Brachial Pressure 89 mmHg   [id number] RAHAT Shen MD  Electronically signed by [id number] RAHAT Shen MD on 2/11/2025 at 3:59:10 PM  ** Final **     IR guided thoracentesis    Result Date: 2/11/2025  Interpreted By:  Patrick Alarcon, STUDY: IR GUIDED THORACENTESIS; 2/11/20253:41 pm   INDICATION: Signs/Symptoms:pleural effusion, needing thora   COMPARISON: None.   ACCESSION NUMBER(S): [id number]   ORDERING CLINICIAN: BRYSON ALEJO   TECHNIQUE: INTERVENTIONALIST: Patrick Alarcon CNP   CONSENT: The patient/patient's POA/next of kin was informed of the nature of the proposed procedure. The purposes, alternatives, risks, and benefits were explained and discussed. All questions were answered and consent was obtained.   SEDATION: None   MEDICATION/CONTRAST: Lidocaine 1%.   TIME OUT: A time out was performed immediately prior to procedure start with the interventional team, correctly identifying the patient name, date of birth, MRN, procedure, anatomy (including marking of site and side), patient position, procedure consent form, relevant laboratory and imaging test results, antibiotic administration, safety precautions, and procedure-specific equipment needs.   FINDINGS: The patient was placed in the right lateral position.   The patient's left pleural space was scanned using the ultrasound probe. The area of fluid most amenable to drainage was marked.   The patient's skin was sterilized using chlorhexidine and draped in sterile manner. The skin was anesthestized with 1% lidocaine. A 5F valved centesis catheter was inserted into the pleural space where previously marked. A total of 550 mL of pleural fluid was removed. The catheter was then removed and gauze and a tegaderm were placed over the insertion site. Sterile technique used throughout entirety of procedure.   Specimens were obtained, labeled and sent to lab with requisitions ordered by primary team.   The patient tolerated the procedure well and there were no immediate complications.       Uneventful  thoracentesis, as detailed above. left Pleural space, 550 mL.   Performed and dictated at [facility].   Signed by: Patrick Alarcon 2/11/2025 3:43 PM Dictation workstation:   [id number]    CT angio chest for pulmonary embolism    Result Date: 2/10/2025  Interpreted By:  Alex Campo, STUDY: CT ANGIO CHEST FOR PULMONARY EMBOLISM;  2/10/2025 10:11 am   INDICATION: Signs/Symptoms:abd pain.     COMPARISON: 11/14/2024   ACCESSION NUMBER(S): [id number]   ORDERING CLINICIAN: RONAL RIBEIRO   TECHNIQUE: Helical data acquisition of the chest was obtained with 50 mL Omnipaque 350. Images were reformatted in coronal and sagittal planes. Axial and coronal MIP images were created and reviewed.   FINDINGS: POTENTIAL LIMITATIONS OF THE STUDY: None   HEART AND VESSELS: No discrete filling defects within the main pulmonary artery or its branches.   Main pulmonary artery and its branches are normal in caliber.   The thoracic aorta is of normal course and caliber with vascular calcifications.   Dense coronary artery calcifications are seen.The study is not optimized for evaluation of coronary arteries.   The cardiac chambers are not enlarged.   No evidence of pericardial effusion.   MEDIASTINUM AND CUBA, LOWER NECK AND AXILLA: The visualized thyroid gland is within normal limits.   No evidence of thoracic lymphadenopathy by CT criteria.   Esophagus appears within normal limits as seen.   LUNGS AND AIRWAYS: The trachea and central airways are patent. No endobronchial lesion.   New layering left effusion with partial consolidation of the left lower lobe. 14 mm spiculated nodule within the left lung apex not significantly changed from the recent prior. 4 mm nodule within the right lung apex on image 60 unchanged.   UPPER ABDOMEN: The visualized subdiaphragmatic structures demonstrate no remarkable findings.   CHEST WALL AND OSSEOUS STRUCTURES: There are no suspicious osseous lesions. Generalized  anasarca.       1.  No pulmonary emboli. 2. New layering left effusion with compressive atelectatic change about the left lower lobe. 3. Unchanged 14 mm spiculated nodule within the left lung apex. Elective PET remains the recommendation for further evaluation.     MACRO: None   Signed by: Alex Campo 2/10/2025 10:39 AM Dictation workstation:   [id number]    CT abdomen pelvis w IV contrast    Result Date: 2/10/2025  Interpreted By:  Shahid Baird, STUDY: CT ABDOMEN PELVIS W IV CONTRAST;  2/10/2025 3:33 am   INDICATION: Signs/Symptoms:bilateral lower quadrant abdominal pain.     COMPARISON: CT abdomen and pelvis with contrast 14 November 2024   ACCESSION NUMBER(S): [id number]   ORDERING CLINICIAN: RONAL RIBEIRO   TECHNIQUE: CT of the abdomen and pelvis from the lung bases through the symphysis pubis after the uneventful administration of intravenous contrast (75 mL Omnipaque 350). No oral contrast.   FINDINGS: LOWER CHEST: New at least moderate size free-flowing left pleural effusion with adjacent multisegmental left lower lobe collapse. Mucous plugging of a short segment of a left lower lobe bronchus   BONES: No acute skeletal findings.   LIVER: Normal. No enlargement or evidence of cirrhosis or fatty change. No mass or other suspect lesion.   SPLEEN: Normal. No enlargement, mass or evidence of splenic vein thrombosis.   PANCREAS: Normal. No CT evidence of acute or chronic pancreatitis. No duct dilation. No mass.   GALLBLADDER: Borderline dilated. CT cannot exclude stones. No wall thickening or adjacent fluid   BILE DUCTS: Normal. No biliary duct dilation.   ADRENAL GLANDS: Normal. No nodule or mass.   KIDNEYS AND URETERS: Normal.  No hydronephrosis on either side.  No mass.  Symmetric enhancement.  No infarct or CT evidence of acute pyelonephritis.  No substantial radiodense stone.  Tiny stones and radiolucent stones could be occult on CT.   LYMPH NODES: No adenopathy, intraperitoneal, retroperitoneal, pelvic  or otherwise   APPENDIX: Normal.  Not dilated, thick walled or in any other way inflamed in appearance.  No inflammatory change about the appendix.   COLON: Wall thickening of and inner wall hyperenhancement in the left, sigmoid and rectum; featureless in the transverse colon; right colon unremarkable   SMALL BOWEL: Normal. No small bowel dilation or any other sign of small bowel obstruction. No sign of active inflammatory bowel disease.   STOMACH / DUODENUM: Grossly normal by CT which has limited sensitivity and specificity for the stomach and duodenum.   RETROPERITONEUM: Impressive edema throughout new from prior   OMENTUM, MESENTERY AND PERITONEAL SPACES: Free intraperitoneal air: Negative Free intraperitoneal fluid: Negative Abscess: Negative Other: n/a   URINARY BLADDER: Distended but not thick-walled. No stone or acute blood clot in the lumen   PELVIS: No obvious acute adnexal abnormality by CT   VASCULATURE: Densely calcified but normal caliber abdominal aorta and iliac arteries. Probability of acute deep venous thrombosis of left iliofemoral vein as annotated with an arrow on axial 142   ABDOMINAL WALL: Hernia: Negative Other: Massive edema new from prior       There are findings of both chronic and acute colitis, with the acute element in the left, sigmoid and rectum whereas transverse colon does not appear acutely inflamed but featureless from chronic colitis   Impressive fluid overload. There is new edema throughout the retroperitoneum and subcutaneous fat and mesenteric edema all new from last CT November 20, 2024   Probability of acute deep venous thrombosis of left iliofemoral vein as annotated with an arrow in the groin on axial 142   No acute appendicitis, small bowel obstruction, perforation, abscess or free fluid   MACRO: None   Signed by: Shahid Baird 2/10/2025 9:48 AM Dictation workstation:   [id number]    ECG 12 lead    Result Date: 2/10/2025  Normal sinus rhythm Low voltage QRS Septal infarct  , age undetermined Abnormal ECG When compared with ECG of 22-NOV-2024 14:32, Nonspecific T wave abnormality, worse in Lateral leads    Vascular US lower extremity venous duplex left    Result Date: 2/10/2025            [facility] [address] [address] [facility] [place] [address]     Tel [phone] Fax [phone]  Vascular Lab Report  VASC US LOWER EXTREMITY VENOUS DUPLEX LEFT Patient Name:      JAMEY HERNANDEZROXI Crews Physician:  [id number] Peggy Mathew MD Study Date:        2/10/2025            Ordering Provider:  [id number] RONAL RIBEIRO MRN/PID:           [id number]             Fellow: Accession#:        [id number]         Technologist:       Ysabel Herndon RVT, RDMS Date of Birth/Age: 1950 / 74 years Technologist 2: Gender:            M                    Encounter#:         [id number] Admission Status:  Emergency            Location Performed: [facility]  Diagnosis/ICD: Pain in left leg-M79.605 Indication:    Left leg pain, concern for DVT on CT imaging. CPT Codes:     25388 Peripheral venous duplex scan for DVT Limited  **CRITICAL RESULT** Critical Result: Left EIV, CFV & proximal FV DVT. Notification called to Dr. Ribeiro & Dr. Worrell on 2/10/2025 at 8:42:24 AM. Acknowledged critical results notification communicated via secure chat by Ysabel Herndon RDMS, RVT.  CONCLUSIONS: Right Lower Venous: The right common femoral vein demonstrates normal spontaneous and respirophasic flow. Left Lower Venous: There is acute non-occlusive deep vein thrombosis visualized in the distal external iliac, common femoral and proximal femoral veins. The remainder of the left leg is negative for deep vein thrombosis. Additional Findings: There are lymph nodes in the left groin that measure 1.3 cm & 1.2 cm. 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 There is distal flow in  the mid-distal femoral artery and the popliteal artery.  Imaging & Doppler Findings:  Right        Flow CFV   Spontaneous/Phasic  Left                  Compress      Thrombus              Flow Distal External Iliac    No    Acute non-occlusive Spontaneous/Phasic CFV                      No    Acute non-occlusive Spontaneous/Phasic FV Proximal              No    Acute non-occlusive Spontaneous/Phasic FV Mid                  Yes           None FV Distal               Yes           None Popliteal               Yes           None         Spontaneous/Phasic Peroneal                Yes           None PTV                     Yes           None  [id number] Peggy Mathew MD Electronically signed by [id number] Peggy Mathew MD on 2/10/2025 at 8:50:10 AM  ** Final **        Assessment/Plan   IMP:  Patient does not seem to realize how ill he actually is at this time. He has lost a significant amount of weight and he is barely eating, asking only for diet Pepsi. Patient reports he is feeling better as his leg with the DVT is now much less painful. He did meet with oncology and there is a follow up plan for his cancer treatment. The radiology studies indicate that the nodules I his lungs are stable but I am awaiting results of the cytology on the thoracentesis.   Spoke with sister Mayda. Patient states she is his healthcare POA. Requested Pastoral Care to assist with forms tomorrow morning at 10:00 am. Sister is aware of the fragility of his health. She lost her  5 months ago and she also care for her 100 year old mother(whom she brought to visit her son today). Mayda was very tearful on the phone. She is aware that her brother is very ill and she would not want him to go through CPR, the patient however states he wishes to be a Full Code. I explained this to her and for now she will respect his wishes.   Mayda states they are talking discharge tomorrow. Palliative Care will follow up to ensure HCPOA paperwork is completed and given to Mayda  and placed in EMR.      Provider estimate of survival: 1-6 months  Patient Prognostic Awareness: Unknown    Patient/proxy preference for information  Prefers full information    Goals of Care  Discharge to [facility]'s   Emotional support  Assist with HCPOA documents    Is the patient hospice-eligible?   Yes  Was a discussion held re hospice services?   yes  Was a decision made re hospice services?  Sister stated that when he returns to [facility] they will get their Hospice involved in his care.     Other Palliative Support  Emotional support    I spent 60 minutes in the preview, planning and care of this patient.     Rica Davies, APRN-CNS

## 2025-02-14 PROBLEM — K52.9 ACUTE COLITIS: Status: ACTIVE | Noted: 2025-02-10

## 2025-02-14 LAB
ALBUMIN SERPL BCP-MCNC: 1.8 G/DL (ref 3.4–5)
ANION GAP SERPL CALC-SCNC: 17 MMOL/L (ref 10–20)
BACTERIA FLD CULT: NORMAL
BACTERIA UR CULT: NO GROWTH
BUN SERPL-MCNC: 29 MG/DL (ref 6–23)
CALCIUM SERPL-MCNC: 7.4 MG/DL (ref 8.6–10.3)
CHLORIDE SERPL-SCNC: 110 MMOL/L (ref 98–107)
CO2 SERPL-SCNC: 20 MMOL/L (ref 21–32)
CREAT SERPL-MCNC: 1.1 MG/DL (ref 0.5–1.3)
EGFRCR SERPLBLD CKD-EPI 2021: 70 ML/MIN/1.73M*2
ERYTHROCYTE [DISTWIDTH] IN BLOOD BY AUTOMATED COUNT: 15.6 % (ref 11.5–14.5)
GLUCOSE BLD MANUAL STRIP-MCNC: 108 MG/DL (ref 74–99)
GLUCOSE BLD MANUAL STRIP-MCNC: 108 MG/DL (ref 74–99)
GLUCOSE BLD MANUAL STRIP-MCNC: 157 MG/DL (ref 74–99)
GLUCOSE BLD MANUAL STRIP-MCNC: 161 MG/DL (ref 74–99)
GLUCOSE SERPL-MCNC: 111 MG/DL (ref 74–99)
GRAM STN SPEC: NORMAL
GRAM STN SPEC: NORMAL
HCT VFR BLD AUTO: 31.8 % (ref 41–52)
HGB BLD-MCNC: 9.2 G/DL (ref 13.5–17.5)
LABORATORY COMMENT REPORT: NORMAL
LABORATORY COMMENT REPORT: NORMAL
MAGNESIUM SERPL-MCNC: 2.33 MG/DL (ref 1.6–2.4)
MCH RBC QN AUTO: 30.4 PG (ref 26–34)
MCHC RBC AUTO-ENTMCNC: 28.9 G/DL (ref 32–36)
MCV RBC AUTO: 105 FL (ref 80–100)
NRBC BLD-RTO: 0 /100 WBCS (ref 0–0)
PATH REPORT.FINAL DX SPEC: NORMAL
PATH REPORT.GROSS SPEC: NORMAL
PATH REPORT.RELEVANT HX SPEC: NORMAL
PATH REPORT.TOTAL CANCER: NORMAL
PLATELET # BLD AUTO: 194 X10*3/UL (ref 150–450)
POTASSIUM SERPL-SCNC: 3.8 MMOL/L (ref 3.5–5.3)
RBC # BLD AUTO: 3.03 X10*6/UL (ref 4.5–5.9)
SODIUM SERPL-SCNC: 143 MMOL/L (ref 136–145)
WBC # BLD AUTO: 6.6 X10*3/UL (ref 4.4–11.3)

## 2025-02-14 PROCEDURE — 99234 HOSP IP/OBS SM DT SF/LOW 45: CPT

## 2025-02-14 PROCEDURE — 2500000001 HC RX 250 WO HCPCS SELF ADMINISTERED DRUGS (ALT 637 FOR MEDICARE OP): Performed by: NURSE PRACTITIONER

## 2025-02-14 PROCEDURE — 2500000004 HC RX 250 GENERAL PHARMACY W/ HCPCS (ALT 636 FOR OP/ED)

## 2025-02-14 PROCEDURE — 2500000001 HC RX 250 WO HCPCS SELF ADMINISTERED DRUGS (ALT 637 FOR MEDICARE OP): Performed by: PHARMACIST

## 2025-02-14 PROCEDURE — 2500000005 HC RX 250 GENERAL PHARMACY W/O HCPCS: Performed by: NURSE PRACTITIONER

## 2025-02-14 PROCEDURE — 2500000004 HC RX 250 GENERAL PHARMACY W/ HCPCS (ALT 636 FOR OP/ED): Performed by: NURSE PRACTITIONER

## 2025-02-14 PROCEDURE — 36415 COLL VENOUS BLD VENIPUNCTURE: CPT | Performed by: NURSE PRACTITIONER

## 2025-02-14 PROCEDURE — P9045 ALBUMIN (HUMAN), 5%, 250 ML: HCPCS | Mod: JZ | Performed by: NURSE PRACTITIONER

## 2025-02-14 PROCEDURE — P9047 ALBUMIN (HUMAN), 25%, 50ML: HCPCS | Performed by: NURSE PRACTITIONER

## 2025-02-14 PROCEDURE — 82947 ASSAY GLUCOSE BLOOD QUANT: CPT

## 2025-02-14 PROCEDURE — 1100000001 HC PRIVATE ROOM DAILY

## 2025-02-14 PROCEDURE — 2500000002 HC RX 250 W HCPCS SELF ADMINISTERED DRUGS (ALT 637 FOR MEDICARE OP, ALT 636 FOR OP/ED): Performed by: NURSE PRACTITIONER

## 2025-02-14 PROCEDURE — 2500000004 HC RX 250 GENERAL PHARMACY W/ HCPCS (ALT 636 FOR OP/ED): Performed by: INTERNAL MEDICINE

## 2025-02-14 PROCEDURE — 80048 BASIC METABOLIC PNL TOTAL CA: CPT | Performed by: NURSE PRACTITIONER

## 2025-02-14 PROCEDURE — 85027 COMPLETE CBC AUTOMATED: CPT | Performed by: NURSE PRACTITIONER

## 2025-02-14 PROCEDURE — 82040 ASSAY OF SERUM ALBUMIN: CPT | Performed by: NURSE PRACTITIONER

## 2025-02-14 PROCEDURE — 83735 ASSAY OF MAGNESIUM: CPT | Performed by: NURSE PRACTITIONER

## 2025-02-14 RX ORDER — MIDODRINE HYDROCHLORIDE 10 MG/1
10 TABLET ORAL
Status: DISCONTINUED | OUTPATIENT
Start: 2025-02-14 | End: 2025-02-16 | Stop reason: HOSPADM

## 2025-02-14 RX ORDER — ALBUMIN HUMAN 50 G/1000ML
25 SOLUTION INTRAVENOUS ONCE
Status: COMPLETED | OUTPATIENT
Start: 2025-02-14 | End: 2025-02-14

## 2025-02-14 RX ORDER — MIDODRINE HYDROCHLORIDE 10 MG/1
10 TABLET ORAL ONCE
Status: COMPLETED | OUTPATIENT
Start: 2025-02-14 | End: 2025-02-14

## 2025-02-14 RX ORDER — ALBUMIN HUMAN 250 G/1000ML
12.5 SOLUTION INTRAVENOUS ONCE
Status: COMPLETED | OUTPATIENT
Start: 2025-02-14 | End: 2025-02-14

## 2025-02-14 RX ADMIN — SODIUM CHLORIDE, POTASSIUM CHLORIDE, SODIUM LACTATE AND CALCIUM CHLORIDE 500 ML: 600; 310; 30; 20 INJECTION, SOLUTION INTRAVENOUS at 22:44

## 2025-02-14 RX ADMIN — ALUMINUM HYDROXIDE, MAGNESIUM HYDROXIDE, AND DIMETHICONE 30 ML: 200; 20; 200 SUSPENSION ORAL at 16:16

## 2025-02-14 RX ADMIN — SODIUM CHLORIDE, POTASSIUM CHLORIDE, SODIUM LACTATE AND CALCIUM CHLORIDE 500 ML: 600; 310; 30; 20 INJECTION, SOLUTION INTRAVENOUS at 04:13

## 2025-02-14 RX ADMIN — ROPINIROLE 0.5 MG: 0.5 TABLET, FILM COATED ORAL at 21:04

## 2025-02-14 RX ADMIN — INSULIN LISPRO 1 UNITS: 100 INJECTION, SOLUTION INTRAVENOUS; SUBCUTANEOUS at 21:11

## 2025-02-14 RX ADMIN — ALBUMIN HUMAN 12.5 G: 0.25 SOLUTION INTRAVENOUS at 17:03

## 2025-02-14 RX ADMIN — GABAPENTIN 800 MG: 400 CAPSULE ORAL at 09:05

## 2025-02-14 RX ADMIN — ENOXAPARIN SODIUM 50 MG: 60 INJECTION SUBCUTANEOUS at 21:04

## 2025-02-14 RX ADMIN — SITAGLIPTIN 100 MG: 100 TABLET, FILM COATED ORAL at 09:05

## 2025-02-14 RX ADMIN — METRONIDAZOLE 500 MG: 500 TABLET ORAL at 17:03

## 2025-02-14 RX ADMIN — GABAPENTIN 800 MG: 400 CAPSULE ORAL at 15:32

## 2025-02-14 RX ADMIN — ENOXAPARIN SODIUM 50 MG: 60 INJECTION SUBCUTANEOUS at 09:04

## 2025-02-14 RX ADMIN — ALBUMIN (HUMAN) 25 G: 12.5 SOLUTION INTRAVENOUS at 20:44

## 2025-02-14 RX ADMIN — MIDODRINE HYDROCHLORIDE 10 MG: 10 TABLET ORAL at 09:11

## 2025-02-14 RX ADMIN — MIDODRINE HYDROCHLORIDE 10 MG: 10 TABLET ORAL at 16:16

## 2025-02-14 RX ADMIN — GABAPENTIN 800 MG: 400 CAPSULE ORAL at 21:04

## 2025-02-14 RX ADMIN — LIDOCAINE 4% 2 PATCH: 40 PATCH TOPICAL at 09:04

## 2025-02-14 RX ADMIN — CEFTRIAXONE 2 G: 2 INJECTION, POWDER, FOR SOLUTION INTRAMUSCULAR; INTRAVENOUS at 15:32

## 2025-02-14 RX ADMIN — TAMSULOSIN HYDROCHLORIDE 0.4 MG: 0.4 CAPSULE ORAL at 21:04

## 2025-02-14 RX ADMIN — METRONIDAZOLE 500 MG: 500 TABLET ORAL at 09:05

## 2025-02-14 RX ADMIN — METRONIDAZOLE 500 MG: 500 TABLET ORAL at 01:58

## 2025-02-14 ASSESSMENT — COGNITIVE AND FUNCTIONAL STATUS - GENERAL
DAILY ACTIVITIY SCORE: 13
MOVING FROM LYING ON BACK TO SITTING ON SIDE OF FLAT BED WITH BEDRAILS: A LITTLE
HELP NEEDED FOR BATHING: A LOT
CLIMB 3 TO 5 STEPS WITH RAILING: TOTAL
TOILETING: A LOT
HELP NEEDED FOR BATHING: A LOT
MOBILITY SCORE: 12
DRESSING REGULAR UPPER BODY CLOTHING: A LOT
DRESSING REGULAR LOWER BODY CLOTHING: A LOT
MOBILITY SCORE: 12
PERSONAL GROOMING: A LOT
CLIMB 3 TO 5 STEPS WITH RAILING: TOTAL
TURNING FROM BACK TO SIDE WHILE IN FLAT BAD: A LOT
EATING MEALS: A LOT
PERSONAL GROOMING: A LOT
EATING MEALS: A LITTLE
DAILY ACTIVITIY SCORE: 12
MOVING TO AND FROM BED TO CHAIR: A LOT
STANDING UP FROM CHAIR USING ARMS: A LOT
DRESSING REGULAR LOWER BODY CLOTHING: A LOT
TURNING FROM BACK TO SIDE WHILE IN FLAT BAD: A LOT
TOILETING: A LOT
MOVING FROM LYING ON BACK TO SITTING ON SIDE OF FLAT BED WITH BEDRAILS: A LITTLE
WALKING IN HOSPITAL ROOM: A LOT
WALKING IN HOSPITAL ROOM: A LOT
STANDING UP FROM CHAIR USING ARMS: A LOT
DRESSING REGULAR UPPER BODY CLOTHING: A LOT
MOVING TO AND FROM BED TO CHAIR: A LOT

## 2025-02-14 ASSESSMENT — PAIN SCALES - GENERAL
PAINLEVEL_OUTOF10: 0 - NO PAIN
PAINLEVEL_OUTOF10: 0 - NO PAIN

## 2025-02-14 NOTE — PROGRESS NOTES
Date of Service: 1/20/25      HPI/Subjective  Josias Ramirez is a 74 y.o. male  Past medical history of squamous cell carcinoma of the nose, lung CA, tobacco use disorder, type 2 diabetes, hyperlipidemia, chronic anemia, peptic ulcer disease, GERD.      Re-evaluated per family and NP request.     Has been having intermittent diarrhea per nursing home staff. Was found to have C.Diff and was initiated on PO vancomycin.     Currently patient seen/examined at bedside. He is just finishing with therapy. States that diarrhea is significantly improved from prior.     Denies any associated fevers, chills, black/bloody stools, abdominal pain.     ROS otherwise negative.     Other Medical, Surgical, Family, Social Hx, Allergies per chart in PCC.   Medication list reviewed. Please see PCC.     Objective:   Physical Exam     Vital signs reviewed. Please see chart in PCC.     General: NAD. NCAT.  Alert, awake  HEENT: PERRLA. EOMI. MMM.  History of squamous cell carcinoma of the nose  Cardiovascular: RRR. S1/S2 wnl.   Respiratory: CTABL. No acute respiratory distress.   GI: Soft, NT abdomen. BS present x 4.   MSK: Generalized weakness  Extremities: No major edema.   Skin: No visible rashes or bruises.   Neuro: Cranial Nerves grossly intact.  No new acute focal deficits noted   Psych: Mood wnl.          REVIEW OF SYSTEMS   ROS reviewed within HPI and is otherwise negative       Assessment and Plan  Encounter Diagnosis   Name Primary?    C. difficile diarrhea Yes     -Continue PO vancomycin  -Monitor worsening diarrhea  -Monitor for abdominal pain, fevers, chills, black/bloody stools.   -Current medication therapy reviewed. Continue and monitor for adverse effects.  -Monitor vitals  -Monitor labs  -PT/OT as tolerated        Charting was completed using voice recognition technology and may include unintended errors.    Maureen Byrd MD

## 2025-02-14 NOTE — PROGRESS NOTES
Internal Medicine Progress Note    Patient Name: Josias Ramirez          MRN: [id number]  Today's Date: February 14, 2025          Attending: Derrell Olvera MD    Subjective:  Patient was seen and examined at bedside, patient lays down in bed, patient looks comfortable, patient is hypertensive but asymptomatic, he denies dizziness    Review Of Systems:  GENERAL: No malaise or fevers.  CARDIOVASCULAR: Negative for chest pain  RESPIRATORY: Negative for shortness of breath  GI: No nausea, vomiting, or diarrhea    Objective:  Vitals:    02/14/25 0810 02/14/25 1153 02/14/25 1300 02/14/25 1600   BP: (!) 83/45 81/57 96/52 86/53   BP Location: Right arm Right arm Left arm Left arm   Patient Position: Lying Lying Sitting Lying   Pulse:  103  106   Resp:    16   Temp:  36.2 °C (97.2 °F)  36.6 °C (97.9 °F)   TempSrc:  Temporal  Temporal   SpO2:  98%  98%   Weight:       Height:           Physical Exam:   General appearance: Thin, alert, in no acute distress   Lungs: Clear to auscultation, no wheezing or rhonchi  Heart: RRR without murmur, gallop, or rubs  Abdomen: Soft, non-tender. Bowel sounds normal  Extremities: No deformity, no edema  Neuro: Alert oriented x3, no focal deficit    Labs:  Results for orders placed or performed during the hospital encounter of 02/10/25 (from the past 24 hours)   POCT GLUCOSE   Result Value Ref Range    POCT Glucose 108 (H) 74 - 99 mg/dL   POCT GLUCOSE   Result Value Ref Range    POCT Glucose 100 (H) 74 - 99 mg/dL   CBC   Result Value Ref Range    WBC 6.6 4.4 - 11.3 x10*3/uL    nRBC 0.0 0.0 - 0.0 /100 WBCs    RBC 3.03 (L) 4.50 - 5.90 x10*6/uL    Hemoglobin 9.2 (L) 13.5 - 17.5 g/dL    Hematocrit 31.8 (L) 41.0 - 52.0 %     (H) 80 - 100 fL    MCH 30.4 26.0 - 34.0 pg    MCHC 28.9 (L) 32.0 - 36.0 g/dL    RDW 15.6 (H) 11.5 - 14.5 %    Platelets 194 150 - 450 x10*3/uL   Basic Metabolic Panel   Result Value Ref Range    Glucose 111 (H) 74 - 99 mg/dL    Sodium 143 136 - 145 mmol/L    Potassium 3.8 3.5  - 5.3 mmol/L    Chloride 110 (H) 98 - 107 mmol/L    Bicarbonate 20 (L) 21 - 32 mmol/L    Anion Gap 17 10 - 20 mmol/L    Urea Nitrogen 29 (H) 6 - 23 mg/dL    Creatinine 1.10 0.50 - 1.30 mg/dL    eGFR 70 >60 mL/min/1.73m*2    Calcium 7.4 (L) 8.6 - 10.3 mg/dL   Magnesium   Result Value Ref Range    Magnesium 2.33 1.60 - 2.40 mg/dL   POCT GLUCOSE   Result Value Ref Range    POCT Glucose 108 (H) 74 - 99 mg/dL   POCT GLUCOSE   Result Value Ref Range    POCT Glucose 108 (H) 74 - 99 mg/dL       Medications:  Scheduled medications  cefTRIAXone, 2 g, intravenous, q24h  [Held by provider] celecoxib, 200 mg, oral, Daily  enoxaparin, 1 mg/kg, subcutaneous, q12h  gabapentin, 800 mg, oral, TID  insulin lispro, 0-5 Units, subcutaneous, Before meals & nightly  lidocaine, 2 patch, transdermal, Daily  lisinopril, 2.5 mg, oral, Daily  metroNIDAZOLE, 500 mg, oral, q8h LASHELL  midodrine, 10 mg, oral, BID  rOPINIRole, 0.5 mg, oral, Nightly  SITagliptin phosphate, 100 mg, oral, Daily  tamsulosin, 0.4 mg, oral, Nightly      Continuous medications     PRN medications  PRN medications: acetaminophen, alum-mag hydroxide-simeth, alum-mag hydroxide-simeth, bisacodyl, cyclobenzaprine, dextrose, dextrose, glucagon, glucagon, lidocaine, loperamide, magnesium hydroxide, polyethylene glycol, sodium phosphates      Assessment/Plan:  Assessment & Plan  Acute colitis  Improving   patient denies abdominal pain  Continue Rocephin and Flagyl  Type 2 diabetes mellitus  Sliding scale insulin  Monitor blood sugar  Nonmelanoma skin cancer  Patient has squamous cell carcinoma on the nose  Mixed hyperlipidemia  Continue current medications  DVT (deep venous thrombosis) (Multi)  Continue anticoagulation  Hematology is following  Chronic anemia  Hemoglobin stable  Monitor blood count  Transfuse if hemoglobin drops below 7  Pleural effusion on left  Continue monitoring    Blood pressure runs low, will give patient midodrine, monitor tonight and possible discharge  "tomorrow.    Discussed with patient, RN    Derrell Olvera MD   Date: 02/14/25  Time: 6:44 PM    This note was partially created using voice recognition software and is inherently subject to errors including those of syntax and \"sound-alike\" substitutions which may escape proofreading. In such instances, original meaning may be extrapolated by contextual derivation    "

## 2025-02-14 NOTE — CARE PLAN
The patient's goals for the shift include  comfort/sleep    The clinical goals for the shift include remain free from falls this shift      Problem: Safety - Adult  Goal: Free from fall injury  Outcome: Progressing

## 2025-02-14 NOTE — PROGRESS NOTES
" INPATIENT PROGRESS NOTES    PATIENT NAME: Josias Ramirez    MRN: [id number]  SERVICE DATE:  2/14/2025   SERVICE TIME:  11:18 AM    SIGNATURE: Cora Griffith MD         ASSESSMENT :   -Acute descending and sigmoid colitis  -Presents with left-sided abdominal pain and diarrhea  -Negative C. difficile PCR and stool PCR panel   -History of lung cancer, diabetes, PUD, chronic anemia     PLAN:  -On Ceftriaxone and Flagyl   -Closely monitor for antibiotics side effects including rash, Diarrhea/CDI, thrombocytopenia, LAVINIA, etc.  -Can be discharged on cefuroxime and Flagyl through 2/20          SUBJECTIVE  Afebrile  No events overnight       OBJECTIVE  PHYSICAL EXAM:   Patient Vitals for the past 24 hrs:   BP Temp Temp src Pulse Resp SpO2   02/14/25 0810 (!) 83/45 -- -- -- -- --   02/14/25 0808 85/50 36.2 °C (97.2 °F) Temporal 100 16 99 %   02/14/25 0400 84/52 37.1 °C (98.8 °F) Temporal 102 16 95 %   02/14/25 0000 94/54 36.5 °C (97.7 °F) Temporal 104 16 95 %   02/13/25 2000 94/52 36.9 °C (98.4 °F) Temporal 103 18 98 %   02/13/25 1600 92/54 37.3 °C (99.1 °F) Temporal 103 18 97 %   02/13/25 1200 133/80 36.9 °C (98.4 °F) Temporal 105 18 99 %         Gen: NAD  GI: Abd soft, Left LLL TTP    Labs:  Lab Results   Component Value Date    WBC 6.6 02/14/2025    HGB 9.2 (L) 02/14/2025    HCT 31.8 (L) 02/14/2025     (H) 02/14/2025     02/14/2025     Lab Results   Component Value Date    GLUCOSE 111 (H) 02/14/2025    CALCIUM 7.4 (L) 02/14/2025     02/14/2025    K 3.8 02/14/2025    CO2 20 (L) 02/14/2025     (H) 02/14/2025    BUN 29 (H) 02/14/2025    CREATININE 1.10 02/14/2025   ESR: --No results found for: \"SEDRATE\"No results found for: \"CRP\"  Lab Results   Component Value Date    ALT 9 (L) 02/10/2025    AST 18 02/10/2025    ALKPHOS 73 02/10/2025    BILITOT 0.5 02/10/2025         DATA:   Diagnostic tests reviewed for today's visit:    Labs this admission reviewed  Imagings this admission reviewed  Cultures: " "Reviewed        Cora Griffith MD.   Infectious Disease Attending            This note was partially created using voice recognition software and is inherently subject to errors including those of syntax and \"sound-alike\" substitutions which may escape proofreading. In such instances, original meaning may be extrapolated by contextual derivation       "

## 2025-02-14 NOTE — PROGRESS NOTES
Occupational Therapy                 Therapy Communication Note-Screen    Patient Name: Josias Ramirez  MRN: [id number]  Department: [facility] 3 S  Room: 3126/3126-A  Today's Date: 2/14/2025     Discipline: Occupational Therapy    Comment: Updated chart review, and confirmed that pt is from LTC facility, and can return when discharged. Per EMR, pt mainly wheelchair bound, and requires assist with ADL's. Pt does not have acute OT needs at OT services will be discharged.

## 2025-02-14 NOTE — CARE PLAN
The clinical goals for the shift include remain free from falls this shift      Problem: Pain - Adult  Goal: Verbalizes/displays adequate comfort level or baseline comfort level  Outcome: Progressing  Flowsheets (Taken 2/13/2025 1543)  Verbalizes/displays adequate comfort level or baseline comfort level:   Encourage patient to monitor pain and request assistance   Assess pain using appropriate pain scale   Administer analgesics based on type and severity of pain and evaluate response   Implement non-pharmacological measures as appropriate and evaluate response   Consider cultural and social influences on pain and pain management     Problem: Safety - Adult  Goal: Free from fall injury  Outcome: Progressing  Flowsheets (Taken 2/13/2025 1543)  Free from fall injury:   Instruct family/caregiver on patient safety   Based on caregiver fall risk screen, instruct family/caregiver to ask for assistance with transferring infant if caregiver noted to have fall risk factors     Problem: Discharge Planning  Goal: Discharge to home or other facility with appropriate resources  Outcome: Progressing  Flowsheets (Taken 2/13/2025 1543)  Discharge to home or other facility with appropriate resources:   Identify barriers to discharge with patient and caregiver   Arrange for needed discharge resources and transportation as appropriate   Identify discharge learning needs (meds, wound care, etc)   Arrange for interpreters to assist at discharge as needed   Refer to discharge planning if patient needs post-hospital services based on physician order or complex needs related to functional status, cognitive ability or social support system     Problem: Chronic Conditions and Co-morbidities  Goal: Patient's chronic conditions and co-morbidity symptoms are monitored and maintained or improved  Outcome: Progressing  Flowsheets (Taken 2/13/2025 1543)  Care Plan - Patient's Chronic Conditions and Co-Morbidity Symptoms are Monitored and  Maintained or Improved:   Monitor and assess patient's chronic conditions and comorbid symptoms for stability, deterioration, or improvement   Collaborate with multidisciplinary team to address chronic and comorbid conditions and prevent exacerbation or deterioration   Update acute care plan with appropriate goals if chronic or comorbid symptoms are exacerbated and prevent overall improvement and discharge     Problem: Nutrition  Goal: Nutrient intake appropriate for maintaining nutritional needs  Outcome: Progressing     Problem: Skin  Goal: Decreased wound size/increased tissue granulation at next dressing change  Outcome: Progressing  Flowsheets (Taken 2/14/2025 1242)  Decreased wound size/increased tissue granulation at next dressing change:   Promote sleep for wound healing   Protective dressings over bony prominences  Goal: Participates in plan/prevention/treatment measures  Outcome: Progressing  Flowsheets (Taken 2/14/2025 1242)  Participates in plan/prevention/treatment measures:   Discuss with provider PT/OT consult   Elevate heels   Increase activity/out of bed for meals  Goal: Prevent/manage excess moisture  Outcome: Progressing  Flowsheets (Taken 2/14/2025 1242)  Prevent/manage excess moisture:   Cleanse incontinence/protect with barrier cream   Moisturize dry skin   Follow provider orders for dressing changes   Monitor for/manage infection if present  Goal: Prevent/minimize sheer/friction injuries  Outcome: Progressing  Flowsheets (Taken 2/14/2025 1242)  Prevent/minimize sheer/friction injuries:   Complete micro-shifts as needed if patient unable. Adjust patient position to relieve pressure points, not a full turn   Increase activity/out of bed for meals   Use pull sheet   HOB 30 degrees or less   Turn/reposition every 2 hours/use positioning/transfer devices  Goal: Promote/optimize nutrition  Outcome: Progressing  Flowsheets (Taken 2/14/2025 1242)  Promote/optimize nutrition:   Assist with feeding    Monitor/record intake including meals   Consume > 50% meals/supplements   Offer water/supplements/favorite foods   Discuss with provider if NPO > 2 days   Reassess MST if dietician not consulted  Goal: Promote skin healing  Outcome: Progressing  Flowsheets (Taken 2/14/2025 1242)  Promote skin healing:   Assess skin/pad under line(s)/device(s)   Protective dressings over bony prominences   Turn/reposition every 2 hours/use positioning/transfer devices     Problem: Fall/Injury  Goal: Not fall by end of shift  Outcome: Progressing  Goal: Be free from injury by end of the shift  Outcome: Progressing  Goal: Verbalize understanding of personal risk factors for fall in the hospital  Outcome: Progressing  Goal: Verbalize understanding of risk factor reduction measures to prevent injury from fall in the home  Outcome: Progressing  Goal: Use assistive devices by end of the shift  Outcome: Progressing  Goal: Pace activities to prevent fatigue by end of the shift  Outcome: Progressing     Problem: Diabetes  Goal: Achieve decreasing blood glucose levels by end of shift  Outcome: Progressing  Flowsheets (Taken 2/14/2025 1242)  Achieve decreasing blood glucose levels by end of shift: Med administration/monitoring of effect  Goal: Increase stability of blood glucose readings by end of shift  Outcome: Progressing  Flowsheets (Taken 2/13/2025 1543)  Increase stability of blood glucose readings by end of shift: Med administration/monitoring of effect  Goal: Maintain electrolyte levels within acceptable range throughout shift  Outcome: Progressing  Flowsheets (Taken 2/13/2025 7343)  Maintain electrolyte levels within acceptable range throughout shift:   Med administration/monitoring of effect   Monitor urine output  Goal: Maintain glucose levels >70mg/dl to <250mg/dl throughout shift  Outcome: Progressing  Flowsheets (Taken 2/13/2025 5283)  Maintain glucose levels >70mg/dl to <250mg/dl throughout shift: Med administration/monitoring of  effect  Goal: No changes in neurological exam by end of shift  Outcome: Progressing  Goal: Learn about and adhere to nutrition recommendations by end of shift  Outcome: Progressing  Flowsheets (Taken 2/13/2025 8729)  Learn about and adhere to nutrition recommendations by end of shift: Ensure/encourage compliance with appropriate diet  Goal: Vital signs within normal range for age by end of shift  Outcome: Progressing  Flowsheets (Taken 2/13/2025 7891)  Vital signs within normal range for age by end of shift: Med administration/monitoring of effect  Goal: Increase self care and/or family involovement by end of shift  Outcome: Progressing  Goal: Receive DSME education by end of shift  Outcome: Progressing  Flowsheets (Taken 2/13/2025 1283)  Receive DSME education by end of shift: Provide patient centered education on Diabetic Self Management Education     Problem: Pain  Goal: Takes deep breaths with improved pain control throughout the shift  Outcome: Progressing  Goal: Turns in bed with improved pain control throughout the shift  Outcome: Progressing  Goal: Walks with improved pain control throughout the shift  Outcome: Progressing  Goal: Performs ADL's with improved pain control throughout shift  Outcome: Progressing  Goal: Participates in PT with improved pain control throughout the shift  Outcome: Progressing  Goal: Free from opioid side effects throughout the shift  Outcome: Progressing  Goal: Free from acute confusion related to pain meds throughout the shift  Outcome: Progressing

## 2025-02-14 NOTE — PROGRESS NOTES
Spiritual Care Visit  Spiritual Care Request    Reason for Visit:  Routine Visit: Introduction     Request Received From:       Focus of Care:  Visited With: Patient and family together         Refer to :          Spiritual Care Assessment    Spiritual Assessment:                      Care Provided:  Intended Effects: Build relationship of care and support, Demonstrate caring and concern, Preserve dignity and respect  Methods: Assist with spiritual/Jew practices, Offer spiritual/Jew support  Interventions: Assist someone with Advance Directives, Share words of hope and inspiration, Perform a Jew rite or ritual    Sense of Community and or Quaker Affiliation:  Christianity         Addressed Needs/Concerns and/or Emir Through:     Sacramental Encounters  Sacrament of Sick-Anointing: Anointed    Outcome:        Advance Directives:  Advance Directives  Comment: AD's are already  in the EMR      Spiritual Care Annotation    Annotation:

## 2025-02-14 NOTE — PROGRESS NOTES
"Josias Ramirez is a 74 y.o. male on day 3 of admission presenting with Acute colitis.    Subjective   Symptoms (0 - 10, Best to Worst)  [place] Symptom Assessment System  0-10 (Numeric) Pain Score: 0 - No pain      Objective   Patient states he is feeling better today. He ate a couple of bites of a grilled cheese sandwhich for breakfast and he has has on one BM since yesterday afternoon.     Physical Exam  Constitutional:       Appearance: He is ill-appearing and toxic-appearing.   HENT:      Head: Normocephalic.      Right Ear: External ear normal.      Left Ear: External ear normal.      Nose: Nose normal.      Mouth/Throat:      Mouth: Mucous membranes are dry.   Eyes:      Conjunctiva/sclera: Conjunctivae normal.   Cardiovascular:      Rate and Rhythm: Tachycardia present.      Pulses: Normal pulses.   Pulmonary:      Effort: Pulmonary effort is normal.   Abdominal:      General: Abdomen is flat. Bowel sounds are normal.      Palpations: Abdomen is soft.   Musculoskeletal:         General: Normal range of motion.      Cervical back: Normal range of motion.   Skin:     General: Skin is dry.      Capillary Refill: Capillary refill takes 2 to 3 seconds.   Neurological:      Mental Status: He is alert and oriented to person, place, and time. Mental status is at baseline.      Motor: Weakness present.         Last Recorded Vitals  Blood pressure 96/52, pulse 103, temperature 36.2 °C (97.2 °F), temperature source Temporal, resp. rate 16, height 1.803 m (5' 11\"), weight 54.8 kg (120 lb 12.8 oz), SpO2 98%.  Intake/Output last 3 Shifts:  I/O last 3 completed shifts:  In: 790 (14.4 mL/kg) [P.O.:240; IV Piggyback:550]  Out: 1050 (19.2 mL/kg) [Urine:1050 (0.5 mL/kg/hr)]  Weight: 54.8 kg     Relevant Results  albumin human, 12.5 g, intravenous, Once  cefTRIAXone, 2 g, intravenous, q24h  [Held by provider] celecoxib, 200 mg, oral, Daily  enoxaparin, 1 mg/kg, subcutaneous, q12h  gabapentin, 800 mg, oral, TID  insulin lispro, 0-5 " Units, subcutaneous, Before meals & nightly  lidocaine, 2 patch, transdermal, Daily  lisinopril, 2.5 mg, oral, Daily  metroNIDAZOLE, 500 mg, oral, q8h LASHELL  midodrine, 10 mg, oral, BID  rOPINIRole, 0.5 mg, oral, Nightly  SITagliptin phosphate, 100 mg, oral, Daily  tamsulosin, 0.4 mg, oral, Nightly       Results for orders placed or performed during the hospital encounter of 02/10/25 (from the past 24 hours)   POCT GLUCOSE   Result Value Ref Range    POCT Glucose 122 (H) 74 - 99 mg/dL   POCT GLUCOSE   Result Value Ref Range    POCT Glucose 108 (H) 74 - 99 mg/dL   POCT GLUCOSE   Result Value Ref Range    POCT Glucose 100 (H) 74 - 99 mg/dL   CBC   Result Value Ref Range    WBC 6.6 4.4 - 11.3 x10*3/uL    nRBC 0.0 0.0 - 0.0 /100 WBCs    RBC 3.03 (L) 4.50 - 5.90 x10*6/uL    Hemoglobin 9.2 (L) 13.5 - 17.5 g/dL    Hematocrit 31.8 (L) 41.0 - 52.0 %     (H) 80 - 100 fL    MCH 30.4 26.0 - 34.0 pg    MCHC 28.9 (L) 32.0 - 36.0 g/dL    RDW 15.6 (H) 11.5 - 14.5 %    Platelets 194 150 - 450 x10*3/uL   Basic Metabolic Panel   Result Value Ref Range    Glucose 111 (H) 74 - 99 mg/dL    Sodium 143 136 - 145 mmol/L    Potassium 3.8 3.5 - 5.3 mmol/L    Chloride 110 (H) 98 - 107 mmol/L    Bicarbonate 20 (L) 21 - 32 mmol/L    Anion Gap 17 10 - 20 mmol/L    Urea Nitrogen 29 (H) 6 - 23 mg/dL    Creatinine 1.10 0.50 - 1.30 mg/dL    eGFR 70 >60 mL/min/1.73m*2    Calcium 7.4 (L) 8.6 - 10.3 mg/dL   Magnesium   Result Value Ref Range    Magnesium 2.33 1.60 - 2.40 mg/dL   POCT GLUCOSE   Result Value Ref Range    POCT Glucose 108 (H) 74 - 99 mg/dL   POCT GLUCOSE   Result Value Ref Range    POCT Glucose 108 (H) 74 - 99 mg/dL              Assessment/Plan   IMP:  Examined and spoke with patient. He is feeling better and his diarrhea has improved with having only one BM yesterday afternoon and none overnight or this morning. Patient is working with dietary to develop a list of foods he is interested in enough to try to eat. He did eat a few bites  of a grilled cheese sandwhich this morning.   Mayda david in this morning to complete HCPOA paperwork and it was discovered that this was completed last admission and is in fact in our EMR.   As patient continues to luna with appetite, an appetite stimulant was recommended to the primary team. Regarding a discharge plan-patient will return to [facility] with a plan to meet with Hospice once he arrives back at the facility.       Provider estimate of survival: 1-6 months  Patient Prognostic Awareness: No - patient does not want to discuss    Patient/proxy preference for information  Prefers full information    Goals of Care  Manage diarrhea  Addition of medication to improve appetite  Continue to work with Dietary to improve oral/caloric intake  Support family as they navigate healthcare for their family member    Is the patient hospice-eligible?   Yes  Was a discussion held re hospice services?   no  Was a decision made re hospice services?  Sister is in communication with [facility] and their Hospice nurse will meet with him upon return to facility.     Other Palliative Support  Spiritual support from  on Palliative team  Support family    I spent 40 minutes in the review, planning and care of this patient.       Rica Davies, APRN-CNS

## 2025-02-15 LAB
ANION GAP SERPL CALC-SCNC: 17 MMOL/L (ref 10–20)
BUN SERPL-MCNC: 25 MG/DL (ref 6–23)
CALCIUM SERPL-MCNC: 7.4 MG/DL (ref 8.6–10.3)
CHLORIDE SERPL-SCNC: 111 MMOL/L (ref 98–107)
CO2 SERPL-SCNC: 21 MMOL/L (ref 21–32)
CREAT SERPL-MCNC: 0.95 MG/DL (ref 0.5–1.3)
EGFRCR SERPLBLD CKD-EPI 2021: 84 ML/MIN/1.73M*2
ERYTHROCYTE [DISTWIDTH] IN BLOOD BY AUTOMATED COUNT: 15.5 % (ref 11.5–14.5)
GLUCOSE BLD MANUAL STRIP-MCNC: 104 MG/DL (ref 74–99)
GLUCOSE BLD MANUAL STRIP-MCNC: 122 MG/DL (ref 74–99)
GLUCOSE BLD MANUAL STRIP-MCNC: 144 MG/DL (ref 74–99)
GLUCOSE BLD MANUAL STRIP-MCNC: 158 MG/DL (ref 74–99)
GLUCOSE SERPL-MCNC: 114 MG/DL (ref 74–99)
HCT VFR BLD AUTO: 27.7 % (ref 41–52)
HCT VFR BLD AUTO: 29 % (ref 41–52)
HEMOCCULT SP1 STL QL: NEGATIVE
HGB BLD-MCNC: 7.9 G/DL (ref 13.5–17.5)
HGB BLD-MCNC: 8.6 G/DL (ref 13.5–17.5)
MCH RBC QN AUTO: 30.2 PG (ref 26–34)
MCHC RBC AUTO-ENTMCNC: 28.5 G/DL (ref 32–36)
MCV RBC AUTO: 106 FL (ref 80–100)
NRBC BLD-RTO: 0.4 /100 WBCS (ref 0–0)
PLATELET # BLD AUTO: 145 X10*3/UL (ref 150–450)
POTASSIUM SERPL-SCNC: 3.7 MMOL/L (ref 3.5–5.3)
RBC # BLD AUTO: 2.62 X10*6/UL (ref 4.5–5.9)
SODIUM SERPL-SCNC: 145 MMOL/L (ref 136–145)
WBC # BLD AUTO: 4.6 X10*3/UL (ref 4.4–11.3)

## 2025-02-15 PROCEDURE — 36415 COLL VENOUS BLD VENIPUNCTURE: CPT | Performed by: NURSE PRACTITIONER

## 2025-02-15 PROCEDURE — 2500000004 HC RX 250 GENERAL PHARMACY W/ HCPCS (ALT 636 FOR OP/ED): Performed by: INTERNAL MEDICINE

## 2025-02-15 PROCEDURE — 82270 OCCULT BLOOD FECES: CPT | Performed by: NURSE PRACTITIONER

## 2025-02-15 PROCEDURE — 2500000001 HC RX 250 WO HCPCS SELF ADMINISTERED DRUGS (ALT 637 FOR MEDICARE OP): Performed by: NURSE PRACTITIONER

## 2025-02-15 PROCEDURE — 82947 ASSAY GLUCOSE BLOOD QUANT: CPT

## 2025-02-15 PROCEDURE — 2500000004 HC RX 250 GENERAL PHARMACY W/ HCPCS (ALT 636 FOR OP/ED)

## 2025-02-15 PROCEDURE — 85027 COMPLETE CBC AUTOMATED: CPT | Performed by: NURSE PRACTITIONER

## 2025-02-15 PROCEDURE — 2500000005 HC RX 250 GENERAL PHARMACY W/O HCPCS: Performed by: NURSE PRACTITIONER

## 2025-02-15 PROCEDURE — 1100000001 HC PRIVATE ROOM DAILY

## 2025-02-15 PROCEDURE — 2500000002 HC RX 250 W HCPCS SELF ADMINISTERED DRUGS (ALT 637 FOR MEDICARE OP, ALT 636 FOR OP/ED): Performed by: NURSE PRACTITIONER

## 2025-02-15 PROCEDURE — 80048 BASIC METABOLIC PNL TOTAL CA: CPT | Performed by: NURSE PRACTITIONER

## 2025-02-15 PROCEDURE — 2500000001 HC RX 250 WO HCPCS SELF ADMINISTERED DRUGS (ALT 637 FOR MEDICARE OP): Performed by: PHARMACIST

## 2025-02-15 PROCEDURE — 85014 HEMATOCRIT: CPT | Performed by: NURSE PRACTITIONER

## 2025-02-15 RX ORDER — OXYCODONE HYDROCHLORIDE 5 MG/1
5 TABLET ORAL EVERY 6 HOURS PRN
Status: DISCONTINUED | OUTPATIENT
Start: 2025-02-15 | End: 2025-02-16 | Stop reason: HOSPADM

## 2025-02-15 RX ORDER — LOPERAMIDE HYDROCHLORIDE 2 MG/1
2 CAPSULE ORAL 4 TIMES DAILY PRN
Qty: 30 CAPSULE | Refills: 0 | Status: SHIPPED | OUTPATIENT
Start: 2025-02-15

## 2025-02-15 RX ORDER — METRONIDAZOLE 500 MG/1
500 TABLET ORAL EVERY 8 HOURS SCHEDULED
Qty: 15 TABLET | Refills: 0 | Status: SHIPPED | OUTPATIENT
Start: 2025-02-15 | End: 2025-02-20

## 2025-02-15 RX ORDER — MIDODRINE HYDROCHLORIDE 10 MG/1
10 TABLET ORAL
Qty: 60 TABLET | Refills: 0 | Status: SHIPPED | OUTPATIENT
Start: 2025-02-15 | End: 2025-03-17

## 2025-02-15 RX ORDER — LIDOCAINE HYDROCHLORIDE 20 MG/ML
15 SOLUTION OROPHARYNGEAL AS NEEDED
Qty: 100 ML | Refills: 0 | Status: SHIPPED | OUTPATIENT
Start: 2025-02-15

## 2025-02-15 RX ORDER — CEFUROXIME AXETIL 250 MG/1
500 TABLET ORAL 2 TIMES DAILY
Status: DISCONTINUED | OUTPATIENT
Start: 2025-02-15 | End: 2025-02-16 | Stop reason: HOSPADM

## 2025-02-15 RX ADMIN — MIDODRINE HYDROCHLORIDE 10 MG: 10 TABLET ORAL at 17:11

## 2025-02-15 RX ADMIN — LISINOPRIL 2.5 MG: 2.5 TABLET ORAL at 09:30

## 2025-02-15 RX ADMIN — ROPINIROLE 0.5 MG: 0.5 TABLET, FILM COATED ORAL at 21:24

## 2025-02-15 RX ADMIN — METRONIDAZOLE 500 MG: 500 TABLET ORAL at 02:17

## 2025-02-15 RX ADMIN — METRONIDAZOLE 500 MG: 500 TABLET ORAL at 17:11

## 2025-02-15 RX ADMIN — SITAGLIPTIN 100 MG: 100 TABLET, FILM COATED ORAL at 09:29

## 2025-02-15 RX ADMIN — GABAPENTIN 800 MG: 400 CAPSULE ORAL at 15:35

## 2025-02-15 RX ADMIN — METRONIDAZOLE 500 MG: 500 TABLET ORAL at 09:29

## 2025-02-15 RX ADMIN — TAMSULOSIN HYDROCHLORIDE 0.4 MG: 0.4 CAPSULE ORAL at 21:25

## 2025-02-15 RX ADMIN — GABAPENTIN 800 MG: 400 CAPSULE ORAL at 21:24

## 2025-02-15 RX ADMIN — LIDOCAINE 4% 2 PATCH: 40 PATCH TOPICAL at 09:28

## 2025-02-15 RX ADMIN — ENOXAPARIN SODIUM 50 MG: 60 INJECTION SUBCUTANEOUS at 09:30

## 2025-02-15 RX ADMIN — INSULIN LISPRO 1 UNITS: 100 INJECTION, SOLUTION INTRAVENOUS; SUBCUTANEOUS at 21:24

## 2025-02-15 RX ADMIN — CEFTRIAXONE 2 G: 2 INJECTION, POWDER, FOR SOLUTION INTRAMUSCULAR; INTRAVENOUS at 15:40

## 2025-02-15 RX ADMIN — MIDODRINE HYDROCHLORIDE 10 MG: 10 TABLET ORAL at 09:29

## 2025-02-15 RX ADMIN — GABAPENTIN 800 MG: 400 CAPSULE ORAL at 09:29

## 2025-02-15 RX ADMIN — ENOXAPARIN SODIUM 50 MG: 60 INJECTION SUBCUTANEOUS at 21:25

## 2025-02-15 RX ADMIN — CEFUROXIME AXETIL 500 MG: 250 TABLET, FILM COATED ORAL at 21:25

## 2025-02-15 ASSESSMENT — COGNITIVE AND FUNCTIONAL STATUS - GENERAL
MOVING TO AND FROM BED TO CHAIR: A LOT
MOBILITY SCORE: 12
WALKING IN HOSPITAL ROOM: A LOT
MOVING TO AND FROM BED TO CHAIR: A LOT
DRESSING REGULAR UPPER BODY CLOTHING: A LOT
DRESSING REGULAR UPPER BODY CLOTHING: A LOT
STANDING UP FROM CHAIR USING ARMS: A LOT
PERSONAL GROOMING: A LOT
MOBILITY SCORE: 12
DRESSING REGULAR LOWER BODY CLOTHING: A LOT
EATING MEALS: A LITTLE
DAILY ACTIVITIY SCORE: 12
DAILY ACTIVITIY SCORE: 13
STANDING UP FROM CHAIR USING ARMS: A LOT
CLIMB 3 TO 5 STEPS WITH RAILING: TOTAL
HELP NEEDED FOR BATHING: A LOT
PERSONAL GROOMING: A LOT
DRESSING REGULAR LOWER BODY CLOTHING: A LOT
HELP NEEDED FOR BATHING: A LOT
MOVING FROM LYING ON BACK TO SITTING ON SIDE OF FLAT BED WITH BEDRAILS: A LITTLE
TOILETING: A LOT
CLIMB 3 TO 5 STEPS WITH RAILING: TOTAL
EATING MEALS: A LOT
MOVING FROM LYING ON BACK TO SITTING ON SIDE OF FLAT BED WITH BEDRAILS: A LITTLE
TURNING FROM BACK TO SIDE WHILE IN FLAT BAD: A LOT
WALKING IN HOSPITAL ROOM: A LOT
TURNING FROM BACK TO SIDE WHILE IN FLAT BAD: A LOT
TOILETING: A LOT

## 2025-02-15 ASSESSMENT — PAIN SCALES - GENERAL
PAINLEVEL_OUTOF10: 5 - MODERATE PAIN
PAINLEVEL_OUTOF10: 0 - NO PAIN

## 2025-02-15 NOTE — PROGRESS NOTES
" INPATIENT PROGRESS NOTES    PATIENT NAME: Josias Ramirez    MRN: [id number]  SERVICE DATE:  2/15/2025   SERVICE TIME:  11:38 AM    SIGNATURE: Cora Griffith MD         ASSESSMENT :   -Acute descending and sigmoid colitis  -Presents with left-sided abdominal pain and diarrhea  -Negative C. difficile PCR and stool PCR panel   -History of lung cancer, diabetes, PUD, chronic anemia     PLAN:  -Tolerating current antibiotics  -Closely monitor for antibiotics side effects including rash, Diarrhea/CDI, thrombocytopenia, LAVINIA, etc.  -Can be discharged on cefuroxime and Flagyl through 2/20          SUBJECTIVE  Afebrile  No events overnight       OBJECTIVE  PHYSICAL EXAM:   Patient Vitals for the past 24 hrs:   BP Temp Temp src Pulse Resp SpO2   02/15/25 0800 120/62 36 °C (96.8 °F) Temporal 90 18 99 %   02/15/25 0430 110/60 36.4 °C (97.5 °F) Temporal 86 16 97 %   02/15/25 0000 125/62 36.2 °C (97.2 °F) Temporal 85 16 98 %   02/14/25 1948 (!) 87/47 36.7 °C (98.1 °F) Temporal 95 16 96 %   02/14/25 1600 86/53 36.6 °C (97.9 °F) Temporal 106 16 98 %   02/14/25 1300 96/52 -- -- -- -- --   02/14/25 1153 81/57 36.2 °C (97.2 °F) Temporal 103 -- 98 %         Gen: NAD      Labs:  Lab Results   Component Value Date    WBC 4.6 02/15/2025    HGB 7.9 (L) 02/15/2025    HCT 27.7 (L) 02/15/2025     (H) 02/15/2025     (L) 02/15/2025     Lab Results   Component Value Date    GLUCOSE 114 (H) 02/15/2025    CALCIUM 7.4 (L) 02/15/2025     02/15/2025    K 3.7 02/15/2025    CO2 21 02/15/2025     (H) 02/15/2025    BUN 25 (H) 02/15/2025    CREATININE 0.95 02/15/2025   ESR: --No results found for: \"SEDRATE\"No results found for: \"CRP\"  Lab Results   Component Value Date    ALT 9 (L) 02/10/2025    AST 18 02/10/2025    ALKPHOS 73 02/10/2025    BILITOT 0.5 02/10/2025         DATA:   Diagnostic tests reviewed for today's visit:    Labs this admission reviewed  Imagings this admission reviewed  Cultures: Reviewed        Cora Griffith MD. " "  Infectious Disease Attending            This note was partially created using voice recognition software and is inherently subject to errors including those of syntax and \"sound-alike\" substitutions which may escape proofreading. In such instances, original meaning may be extrapolated by contextual derivation       "

## 2025-02-15 NOTE — CARE PLAN
The patient's goals for the shift include      The clinical goals for the shift include will remain free of injury throughout shift      Problem: Pain - Adult  Goal: Verbalizes/displays adequate comfort level or baseline comfort level  Outcome: Progressing     Problem: Safety - Adult  Goal: Free from fall injury  Outcome: Progressing     Problem: Chronic Conditions and Co-morbidities  Goal: Patient's chronic conditions and co-morbidity symptoms are monitored and maintained or improved  Outcome: Progressing     Problem: Skin  Goal: Decreased wound size/increased tissue granulation at next dressing change  Outcome: Progressing  Goal: Participates in plan/prevention/treatment measures  Outcome: Progressing  Goal: Prevent/manage excess moisture  Outcome: Progressing  Goal: Prevent/minimize sheer/friction injuries  Outcome: Progressing  Goal: Promote/optimize nutrition  Outcome: Progressing  Goal: Promote skin healing  Outcome: Progressing

## 2025-02-15 NOTE — CARE PLAN
The patient's goals for the shift include  sleep/comfort    The clinical goals for the shift include pts B/P will increase to a normal range, pt will also be free from falls      Problem: Safety - Adult  Goal: Free from fall injury  Outcome: Progressing

## 2025-02-16 VITALS
DIASTOLIC BLOOD PRESSURE: 56 MMHG | WEIGHT: 120.8 LBS | RESPIRATION RATE: 17 BRPM | SYSTOLIC BLOOD PRESSURE: 98 MMHG | BODY MASS INDEX: 16.91 KG/M2 | TEMPERATURE: 97.3 F | HEIGHT: 71 IN | HEART RATE: 93 BPM | OXYGEN SATURATION: 96 %

## 2025-02-16 LAB
ANION GAP SERPL CALC-SCNC: 16 MMOL/L (ref 10–20)
BUN SERPL-MCNC: 20 MG/DL (ref 6–23)
CALCIUM SERPL-MCNC: 7.6 MG/DL (ref 8.6–10.3)
CHLORIDE SERPL-SCNC: 112 MMOL/L (ref 98–107)
CO2 SERPL-SCNC: 21 MMOL/L (ref 21–32)
CREAT SERPL-MCNC: 0.77 MG/DL (ref 0.5–1.3)
EGFRCR SERPLBLD CKD-EPI 2021: >90 ML/MIN/1.73M*2
ERYTHROCYTE [DISTWIDTH] IN BLOOD BY AUTOMATED COUNT: 15.3 % (ref 11.5–14.5)
GLUCOSE BLD MANUAL STRIP-MCNC: 102 MG/DL (ref 74–99)
GLUCOSE BLD MANUAL STRIP-MCNC: 123 MG/DL (ref 74–99)
GLUCOSE BLD MANUAL STRIP-MCNC: 130 MG/DL (ref 74–99)
GLUCOSE SERPL-MCNC: 110 MG/DL (ref 74–99)
HCT VFR BLD AUTO: 24.3 % (ref 41–52)
HCT VFR BLD AUTO: 26.8 % (ref 41–52)
HGB BLD-MCNC: 7.2 G/DL (ref 13.5–17.5)
HGB BLD-MCNC: 8.1 G/DL (ref 13.5–17.5)
MCH RBC QN AUTO: 30.3 PG (ref 26–34)
MCHC RBC AUTO-ENTMCNC: 29.6 G/DL (ref 32–36)
MCV RBC AUTO: 102 FL (ref 80–100)
NRBC BLD-RTO: 0 /100 WBCS (ref 0–0)
PLATELET # BLD AUTO: 141 X10*3/UL (ref 150–450)
POTASSIUM SERPL-SCNC: 3.6 MMOL/L (ref 3.5–5.3)
RBC # BLD AUTO: 2.38 X10*6/UL (ref 4.5–5.9)
SODIUM SERPL-SCNC: 145 MMOL/L (ref 136–145)
WBC # BLD AUTO: 4 X10*3/UL (ref 4.4–11.3)

## 2025-02-16 PROCEDURE — 2500000002 HC RX 250 W HCPCS SELF ADMINISTERED DRUGS (ALT 637 FOR MEDICARE OP, ALT 636 FOR OP/ED): Performed by: NURSE PRACTITIONER

## 2025-02-16 PROCEDURE — 2500000004 HC RX 250 GENERAL PHARMACY W/ HCPCS (ALT 636 FOR OP/ED)

## 2025-02-16 PROCEDURE — 36415 COLL VENOUS BLD VENIPUNCTURE: CPT | Performed by: NURSE PRACTITIONER

## 2025-02-16 PROCEDURE — 2500000005 HC RX 250 GENERAL PHARMACY W/O HCPCS: Performed by: NURSE PRACTITIONER

## 2025-02-16 PROCEDURE — 82947 ASSAY GLUCOSE BLOOD QUANT: CPT

## 2025-02-16 PROCEDURE — 2500000001 HC RX 250 WO HCPCS SELF ADMINISTERED DRUGS (ALT 637 FOR MEDICARE OP): Performed by: PHARMACIST

## 2025-02-16 PROCEDURE — 2500000001 HC RX 250 WO HCPCS SELF ADMINISTERED DRUGS (ALT 637 FOR MEDICARE OP): Performed by: NURSE PRACTITIONER

## 2025-02-16 PROCEDURE — P9045 ALBUMIN (HUMAN), 5%, 250 ML: HCPCS | Mod: JZ | Performed by: NURSE PRACTITIONER

## 2025-02-16 PROCEDURE — 2500000004 HC RX 250 GENERAL PHARMACY W/ HCPCS (ALT 636 FOR OP/ED): Performed by: NURSE PRACTITIONER

## 2025-02-16 PROCEDURE — 80048 BASIC METABOLIC PNL TOTAL CA: CPT | Performed by: NURSE PRACTITIONER

## 2025-02-16 PROCEDURE — 85014 HEMATOCRIT: CPT | Performed by: NURSE PRACTITIONER

## 2025-02-16 PROCEDURE — 85027 COMPLETE CBC AUTOMATED: CPT | Performed by: NURSE PRACTITIONER

## 2025-02-16 RX ORDER — ALBUMIN HUMAN 50 G/1000ML
25 SOLUTION INTRAVENOUS ONCE
Status: COMPLETED | OUTPATIENT
Start: 2025-02-16 | End: 2025-02-16

## 2025-02-16 RX ORDER — CEFUROXIME AXETIL 500 MG/1
500 TABLET ORAL 2 TIMES DAILY
Qty: 8 TABLET | Refills: 0 | Status: SHIPPED | OUTPATIENT
Start: 2025-02-16 | End: 2025-02-20

## 2025-02-16 RX ADMIN — SITAGLIPTIN 100 MG: 100 TABLET, FILM COATED ORAL at 09:46

## 2025-02-16 RX ADMIN — ALBUMIN (HUMAN) 25 G: 12.5 SOLUTION INTRAVENOUS at 03:06

## 2025-02-16 RX ADMIN — CYCLOBENZAPRINE HYDROCHLORIDE 5 MG: 5 TABLET, FILM COATED ORAL at 01:53

## 2025-02-16 RX ADMIN — OXYCODONE HYDROCHLORIDE 5 MG: 5 TABLET ORAL at 10:29

## 2025-02-16 RX ADMIN — METRONIDAZOLE 500 MG: 500 TABLET ORAL at 09:46

## 2025-02-16 RX ADMIN — ENOXAPARIN SODIUM 50 MG: 60 INJECTION SUBCUTANEOUS at 09:47

## 2025-02-16 RX ADMIN — METRONIDAZOLE 500 MG: 500 TABLET ORAL at 17:50

## 2025-02-16 RX ADMIN — GABAPENTIN 800 MG: 400 CAPSULE ORAL at 09:47

## 2025-02-16 RX ADMIN — MIDODRINE HYDROCHLORIDE 10 MG: 10 TABLET ORAL at 09:46

## 2025-02-16 RX ADMIN — LIDOCAINE 4% 2 PATCH: 40 PATCH TOPICAL at 10:33

## 2025-02-16 RX ADMIN — GABAPENTIN 800 MG: 400 CAPSULE ORAL at 15:59

## 2025-02-16 RX ADMIN — MIDODRINE HYDROCHLORIDE 10 MG: 10 TABLET ORAL at 16:01

## 2025-02-16 RX ADMIN — CEFUROXIME AXETIL 500 MG: 250 TABLET, FILM COATED ORAL at 09:46

## 2025-02-16 RX ADMIN — METRONIDAZOLE 500 MG: 500 TABLET ORAL at 01:45

## 2025-02-16 RX ADMIN — SODIUM CHLORIDE, POTASSIUM CHLORIDE, SODIUM LACTATE AND CALCIUM CHLORIDE 500 ML: 600; 310; 30; 20 INJECTION, SOLUTION INTRAVENOUS at 01:48

## 2025-02-16 ASSESSMENT — COGNITIVE AND FUNCTIONAL STATUS - GENERAL
WALKING IN HOSPITAL ROOM: A LOT
TURNING FROM BACK TO SIDE WHILE IN FLAT BAD: A LOT
DAILY ACTIVITIY SCORE: 13
DRESSING REGULAR UPPER BODY CLOTHING: A LOT
MOBILITY SCORE: 12
CLIMB 3 TO 5 STEPS WITH RAILING: TOTAL
HELP NEEDED FOR BATHING: A LOT
STANDING UP FROM CHAIR USING ARMS: A LOT
PERSONAL GROOMING: A LOT
TOILETING: A LOT
EATING MEALS: A LITTLE
MOVING TO AND FROM BED TO CHAIR: A LOT
DRESSING REGULAR LOWER BODY CLOTHING: A LOT
MOVING FROM LYING ON BACK TO SITTING ON SIDE OF FLAT BED WITH BEDRAILS: A LITTLE

## 2025-02-16 ASSESSMENT — PAIN SCALES - GENERAL
PAINLEVEL_OUTOF10: 3
PAINLEVEL_OUTOF10: 7

## 2025-02-16 ASSESSMENT — PAIN SCALES - WONG BAKER: WONGBAKER_NUMERICALRESPONSE: HURTS LITTLE BIT

## 2025-02-16 ASSESSMENT — PAIN - FUNCTIONAL ASSESSMENT: PAIN_FUNCTIONAL_ASSESSMENT: 0-10

## 2025-02-16 ASSESSMENT — PAIN DESCRIPTION - LOCATION: LOCATION: LEG

## 2025-02-16 NOTE — ASSESSMENT & PLAN NOTE
patient denies abdominal pain  Rocephin was discontinued number was started on Ceftin  Continue Flagyl

## 2025-02-16 NOTE — PROGRESS NOTES
02/16/25 1320   Discharge Planning   Home or Post Acute Services Post acute facilities (Rehab/SNF/etc)   Type of Post Acute Facility Services Long term care   Expected Discharge Disposition Inter     Final paperwork sent to facility. Transport confirmed for 5pm. Facility and nursing updated. Requested nursing to update patient.    1348: Patient requesting I call his sister. Spoke to Mayda on the phone to update her on transport.

## 2025-02-16 NOTE — PROGRESS NOTES
Internal Medicine Progress Note    Patient Name: Josias Ramirez          MRN: [id number]  Today's Date: February 15, 2025          Attending: Derrell Olvera MD    Subjective:  Patient was seen and examined at bedside, patient lays down in bed, patient looks comfortable, patient is hypertensive but asymptomatic, he denies dizziness    Review Of Systems:  GENERAL: No malaise or fevers.  CARDIOVASCULAR: Negative for chest pain  RESPIRATORY: Negative for shortness of breath  GI: No nausea, vomiting, or diarrhea    Objective:  Vitals:    02/15/25 0430 02/15/25 0800 02/15/25 1200 02/15/25 1600   BP: 110/60 120/62 95/54 112/56   BP Location: Left arm Left arm Left arm Left arm   Patient Position: Lying Lying Lying Lying   Pulse: 86 90 94 94   Resp: 16 18 17 17   Temp: 36.4 °C (97.5 °F) 36 °C (96.8 °F)  36.3 °C (97.3 °F)   TempSrc: Temporal Temporal Temporal Temporal   SpO2: 97% 99% 100% 99%   Weight:       Height:           Physical Exam:   General appearance: Thin, alert, in no acute distress   Lungs: Clear to auscultation, no wheezing or rhonchi  Heart: RRR without murmur, gallop, or rubs  Abdomen: Soft, non-tender. Bowel sounds normal  Extremities: No deformity, no edema  Neuro: Alert oriented x3, no focal deficit    Labs:  Results for orders placed or performed during the hospital encounter of 02/10/25 (from the past 24 hours)   POCT GLUCOSE   Result Value Ref Range    POCT Glucose 161 (H) 74 - 99 mg/dL   POCT GLUCOSE   Result Value Ref Range    POCT Glucose 157 (H) 74 - 99 mg/dL   CBC   Result Value Ref Range    WBC 4.6 4.4 - 11.3 x10*3/uL    nRBC 0.4 (H) 0.0 - 0.0 /100 WBCs    RBC 2.62 (L) 4.50 - 5.90 x10*6/uL    Hemoglobin 7.9 (L) 13.5 - 17.5 g/dL    Hematocrit 27.7 (L) 41.0 - 52.0 %     (H) 80 - 100 fL    MCH 30.2 26.0 - 34.0 pg    MCHC 28.5 (L) 32.0 - 36.0 g/dL    RDW 15.5 (H) 11.5 - 14.5 %    Platelets 145 (L) 150 - 450 x10*3/uL   Basic Metabolic Panel   Result Value Ref Range    Glucose 114 (H) 74 - 99 mg/dL     Sodium 145 136 - 145 mmol/L    Potassium 3.7 3.5 - 5.3 mmol/L    Chloride 111 (H) 98 - 107 mmol/L    Bicarbonate 21 21 - 32 mmol/L    Anion Gap 17 10 - 20 mmol/L    Urea Nitrogen 25 (H) 6 - 23 mg/dL    Creatinine 0.95 0.50 - 1.30 mg/dL    eGFR 84 >60 mL/min/1.73m*2    Calcium 7.4 (L) 8.6 - 10.3 mg/dL   POCT GLUCOSE   Result Value Ref Range    POCT Glucose 104 (H) 74 - 99 mg/dL   POCT GLUCOSE   Result Value Ref Range    POCT Glucose 122 (H) 74 - 99 mg/dL   Occult Blood, Stool    Specimen: Stool   Result Value Ref Range    Occult Blood, Stool X1 Negative Negative   Hemoglobin and hematocrit, blood   Result Value Ref Range    Hemoglobin 8.6 (L) 13.5 - 17.5 g/dL    Hematocrit 29.0 (L) 41.0 - 52.0 %   POCT GLUCOSE   Result Value Ref Range    POCT Glucose 144 (H) 74 - 99 mg/dL       Medications:  Scheduled medications  cefuroxime, 500 mg, oral, BID  [Held by provider] celecoxib, 200 mg, oral, Daily  enoxaparin, 1 mg/kg, subcutaneous, q12h  gabapentin, 800 mg, oral, TID  insulin lispro, 0-5 Units, subcutaneous, Before meals & nightly  lidocaine, 2 patch, transdermal, Daily  lisinopril, 2.5 mg, oral, Daily  metroNIDAZOLE, 500 mg, oral, q8h LASHELL  midodrine, 10 mg, oral, BID  rOPINIRole, 0.5 mg, oral, Nightly  SITagliptin phosphate, 100 mg, oral, Daily  tamsulosin, 0.4 mg, oral, Nightly      Continuous medications     PRN medications  PRN medications: acetaminophen, alum-mag hydroxide-simeth, alum-mag hydroxide-simeth, bisacodyl, cyclobenzaprine, dextrose, dextrose, glucagon, glucagon, lidocaine, loperamide, magnesium hydroxide, oxyCODONE, polyethylene glycol, sodium phosphates      Assessment/Plan:  Assessment & Plan  Acute colitis  patient denies abdominal pain  Rocephin was discontinued number was started on Ceftin  Continue Flagyl  Type 2 diabetes mellitus  Sliding scale insulin  Monitor blood sugar  DVT (deep venous thrombosis) (Multi)  Continue anticoagulation  Hematology is following  Chronic anemia  Hemoglobin  "stable  Monitor blood count  Transfuse if hemoglobin drops below 7  Pleural effusion on left  Continue monitoring      Discussed with patient, RN    Derrell Olvera MD   Date: 02/15/25  Time: 8:05 PM    This note was partially created using voice recognition software and is inherently subject to errors including those of syntax and \"sound-alike\" substitutions which may escape proofreading. In such instances, original meaning may be extrapolated by contextual derivation    " No

## 2025-02-16 NOTE — PROGRESS NOTES
Spiritual Care Visit  Spiritual Care Request    Reason for Visit:  Routine Visit: Introduction  Continue Visiting: Yes     Request Received From:       Focus of Care:  Visited With: Patient         Refer to :          Spiritual Care Assessment    Spiritual Assessment:                      Care Provided:  Intended Effects: Establish rapport and connectedness, Radha affirmation, Build relationship of care and support, Demonstrate caring and concern    Sense of Community and or Congregational Affiliation:  Temple         Addressed Needs/Concerns and/or Emir Through:  Congregational Encounters  Congregational Needs: Prayer  Sacramental Encounters  Communion: Patient wants communion  Communion Given Indicator: No  Sacrament of Sick-Anointing: Anointed, Patient requested anointing    Outcome:  Outcome of Spiritual Care Visit: Affirmation, [place], Peace/gratitude, Comfort/healing presence, Spirituality connected     Advance Directives:         Spiritual Care Annotation    Annotation:

## 2025-02-16 NOTE — CARE PLAN
The patient's goals for the shift include      The clinical goals for the shift include will remain hemodynamically stable throughout shift      Problem: Pain - Adult  Goal: Verbalizes/displays adequate comfort level or baseline comfort level  Outcome: Progressing     Problem: Safety - Adult  Goal: Free from fall injury  Outcome: Progressing     Problem: Discharge Planning  Goal: Discharge to home or other facility with appropriate resources  Outcome: Progressing     Problem: Chronic Conditions and Co-morbidities  Goal: Patient's chronic conditions and co-morbidity symptoms are monitored and maintained or improved  Outcome: Progressing

## 2025-02-16 NOTE — DISCHARGE SUMMARY
Discharge Diagnosis  Acute colitis    Issues Requiring Follow-Up  ***    Discharge Meds     Medication List      START taking these medications     cefuroxime 500 mg tablet; Commonly known as: Ceftin; Take 1 tablet (500   mg) by mouth 2 times a day for 4 days.   lidocaine 2 % solution; Commonly known as: Xylocaine; Take 15 mL by   mouth if needed for mild pain (1 - 3) or moderate pain (4 - 6) (pain).   loperamide 2 mg capsule; Commonly known as: Imodium; Take 1 capsule (2   mg) by mouth 4 times a day as needed for diarrhea.   metroNIDAZOLE 500 mg tablet; Commonly known as: Flagyl; Take 1 tablet   (500 mg) by mouth every 8 hours for 5 days.   midodrine 10 mg tablet; Commonly known as: Proamatine; Take 1 tablet (10   mg) by mouth 2 times daily (morning and late afternoon).     CONTINUE taking these medications     * acetaminophen 500 mg tablet; Commonly known as: Tylenol   * acetaminophen 325 mg tablet; Commonly known as: Tylenol; Take 2   tablets (650 mg) by mouth every 4 hours if needed for mild pain (1 - 3),   moderate pain (4 - 6) or headaches.   bisacodyl 10 mg suppository; Commonly known as: Dulcolax   celecoxib 200 mg capsule; Commonly known as: CeleBREX   cyclobenzaprine 5 mg tablet; Commonly known as: Flexeril   gabapentin 800 mg tablet; Commonly known as: Neurontin   lidocaine-diphenhydrAMINE-Maalox 1:1:1 064--40 mg/30 mL liquid   mouthwash; Commonly known as: Magic Mouthwash   lisinopril 2.5 mg tablet   magnesium hydroxide 400 mg/5 mL suspension; Commonly known as: Milk of   Magnesia   rOPINIRole 0.5 mg tablet; Commonly known as: Requip   SITagliptin phosphate 100 mg tablet; Commonly known as: Januvia   sodium phosphates 19-7 gram/118 mL enema enema; Commonly known as:   Fleets   tamsulosin 0.4 mg 24 hr capsule; Commonly known as: Flomax  * This list has 2 medication(s) that are the same as other medications   prescribed for you. Read the directions carefully, and ask your doctor or   other care  provider to review them with you.     STOP taking these medications     alum-mag hydroxide-simeth 200-200-20 mg/5 mL oral suspension; Commonly   known as: Mylanta   neomycin-bacitracnZn-polymyxnB ointment; Commonly known as: Neosporin   Original   nicotine 14 mg/24 hr patch; Commonly known as: Nicoderm CQ   vancomycin 125 mg capsule; Commonly known as: Vancocin     ASK your doctor about these medications     lidocaine 4 % patch; Place 2 patches over 12 hours on the skin once   daily. Remove & discard patch within 12 hours or as directed by MD.       Test Results Pending At Discharge  Pending Labs       No current pending labs.            Hospital Course   ***    Pertinent Physical Exam At Time of Discharge  Physical Exam    Outpatient Follow-Up  No future appointments.      Derrell Olvera MD

## 2025-02-16 NOTE — PROGRESS NOTES
" INPATIENT PROGRESS NOTES    PATIENT NAME: Josias Ramirez    MRN: [id number]  SERVICE DATE:  2/16/2025   SERVICE TIME:  7:24 AM    SIGNATURE: Cora Griffith MD         ASSESSMENT :   -Acute descending and sigmoid colitis  -Presents with left-sided abdominal pain and diarrhea  -Negative C. difficile PCR and stool PCR panel   -History of lung cancer, diabetes, PUD, chronic anemia     PLAN:  -Continue ceftriaxone and Flagyl through 2/20  -Closely monitor for antibiotics side effects including rash, Diarrhea/CDI, thrombocytopenia, LAVINIA, etc.  -Can be discharged on cefuroxime and Flagyl to complete the course          SUBJECTIVE  Afebrile  No events overnight       OBJECTIVE  PHYSICAL EXAM:   Patient Vitals for the past 24 hrs:   BP Temp Temp src Pulse Resp SpO2   02/16/25 0400 104/64 36.3 °C (97.3 °F) Temporal 93 17 100 %   02/16/25 0000 89/53 36 °C (96.8 °F) Temporal 100 16 98 %   02/15/25 2000 105/58 36.2 °C (97.2 °F) Temporal 91 16 97 %   02/15/25 1600 112/56 36.3 °C (97.3 °F) Temporal 94 17 99 %   02/15/25 1200 95/54 -- Temporal 94 17 100 %   02/15/25 0800 120/62 36 °C (96.8 °F) Temporal 90 18 99 %         Gen: NAD      Labs:  Lab Results   Component Value Date    WBC 4.0 (L) 02/16/2025    HGB 7.2 (L) 02/16/2025    HCT 24.3 (L) 02/16/2025     (H) 02/16/2025     (L) 02/16/2025     Lab Results   Component Value Date    GLUCOSE 110 (H) 02/16/2025    CALCIUM 7.6 (L) 02/16/2025     02/16/2025    K 3.6 02/16/2025    CO2 21 02/16/2025     (H) 02/16/2025    BUN 20 02/16/2025    CREATININE 0.77 02/16/2025   ESR: --No results found for: \"SEDRATE\"No results found for: \"CRP\"  Lab Results   Component Value Date    ALT 9 (L) 02/10/2025    AST 18 02/10/2025    ALKPHOS 73 02/10/2025    BILITOT 0.5 02/10/2025         DATA:   Diagnostic tests reviewed for today's visit:    Labs this admission reviewed  Imagings this admission reviewed  Cultures: Reviewed        Cora Griffith MD.   Infectious Disease Attending  " "          This note was partially created using voice recognition software and is inherently subject to errors including those of syntax and \"sound-alike\" substitutions which may escape proofreading. In such instances, original meaning may be extrapolated by contextual derivation       "

## 2025-02-16 NOTE — CARE PLAN
The patient's goals for the shift include  comfort    The clinical goals for the shift include pt will remain hemodynamically stable and will be free of falls      Problem: Safety - Adult  Goal: Free from fall injury  Outcome: Progressing     Problem: Chronic Conditions and Co-morbidities  Goal: Patient's chronic conditions and co-morbidity symptoms are monitored and maintained or improved  Outcome: Progressing     Problem: Diabetes  Goal: Vital signs within normal range for age by end of shift  Outcome: Progressing

## 2025-02-17 ENCOUNTER — NURSING HOME VISIT (OUTPATIENT)
Dept: POST ACUTE CARE | Facility: EXTERNAL LOCATION | Age: 75
End: 2025-02-17
Payer: COMMERCIAL

## 2025-02-17 DIAGNOSIS — C34.92 ADENOCARCINOMA OF LEFT LUNG (MULTI): ICD-10-CM

## 2025-02-17 DIAGNOSIS — I82.402 ACUTE DEEP VEIN THROMBOSIS (DVT) OF LEFT LOWER EXTREMITY, UNSPECIFIED VEIN: Primary | ICD-10-CM

## 2025-02-17 DIAGNOSIS — R53.1 GENERAL WEAKNESS: ICD-10-CM

## 2025-02-17 DIAGNOSIS — K52.9 COLITIS: ICD-10-CM

## 2025-02-17 PROCEDURE — 99309 SBSQ NF CARE MODERATE MDM 30: CPT | Performed by: STUDENT IN AN ORGANIZED HEALTH CARE EDUCATION/TRAINING PROGRAM

## 2025-02-18 ENCOUNTER — TELEPHONE (OUTPATIENT)
Dept: HEMATOLOGY/ONCOLOGY | Facility: HOSPITAL | Age: 75
End: 2025-02-18
Payer: COMMERCIAL

## 2025-02-18 NOTE — TELEPHONE ENCOUNTER
Referral placed to [facility] by Dr. Christian, [facility] ED, for 1.4 cm spiculated left apex nodule, noted on CT angio chest imaging 2/10/25. I called the patient's sister Mayda, as Mr. Ramirez was discharged to [facility], and I'm unable to reach him. She is aware of the referral and was planning to call me today, to notify me he has transitioned to hospice care. She let me know that he has an established oncologist at [facility] SCC of the nose, which progressed after immunotherapy; and if he wishes to pursue eval of his lung nodule, he will reach out to his oncologist. She declined any assistance from the diagnostic clinic & I closed out the referral.  VICTOR M [email or web address].[email or web address]  [facility]

## 2025-02-19 LAB
ATRIAL RATE: 97 BPM
P AXIS: 86 DEGREES
P OFFSET: 197 MS
P ONSET: 137 MS
PR INTERVAL: 182 MS
Q ONSET: 228 MS
QRS COUNT: 16 BEATS
QRS DURATION: 66 MS
QT INTERVAL: 336 MS
QTC CALCULATION(BAZETT): 426 MS
QTC FREDERICIA: 394 MS
R AXIS: -16 DEGREES
T AXIS: 69 DEGREES
T OFFSET: 396 MS
VENTRICULAR RATE: 97 BPM

## 2025-06-16 NOTE — PROGRESS NOTES
Date of Service: 2/17/25      HPI/Subjective  Josias Ramirez is a 74 y.o. male  Past medical history of squamous cell carcinoma of the nose, lung CA, tobacco use disorder, type 2 diabetes, hyperlipidemia, chronic anemia, peptic ulcer disease, GERD.      Pt re-admitted to [facility]    He was sent out to [facility] for abdominal pain and diarrhea. Was found to have LLE DVT and lovenox. Underwent lung thoracentesis 2/11/25 and patient is to follow up at [facility] for cytology.     CTAP done at that time showed descending/sigmoid colitis. Pt was started on CTX and flagyl with end date of 2/20. C.Diff negative at that time. Stool pathogens negative.     He is admitted back to [facility] for continued management.     Currently doing okay. Pain in LLE improved. No major concerns made known.   Abd pain improved. Diarrhea improving.     ROS negative unless mentioned above.     Other Medical, Surgical, Family, Social Hx, Allergies per chart in PCC.   Medication list reviewed. Please see PCC.     Objective:   Physical Exam     Vital signs reviewed. Please see chart in PCC.     General: NAD. NCAT.  Alert, awake  HEENT: PERRLA. EOMI. MMM.  History of squamous cell carcinoma of the nose  Cardiovascular: RRR. S1/S2 wnl.   Respiratory: CTABL. No acute respiratory distress.   GI: Soft, NT abdomen. BS present x 4.   MSK: Generalized weakness  Extremities: No major edema. LLE DVT   Skin: No visible rashes or bruises.   Neuro: Cranial Nerves grossly intact.  No new acute focal deficits noted   Psych: Mood wnl.          REVIEW OF SYSTEMS   ROS reviewed within [facility] [place] and is otherwise negative       Assessment and Plan  Encounter Diagnoses   Name Primary?    Acute deep vein thrombosis (DVT) of left lower extremity, unspecified vein (Multi) Yes    Colitis     General weakness     Adenocarcinoma of left lung (Multi)      -Was taken off lovenox on discharge due to plan for hospice.   -Finish Cefuroxime/Flagyl. Monitor for recurrent diarrhea/C.Diff  -Hospice consult    -Monitor for abdominal pain, fevers, chills, black/bloody stools.   -Current medication therapy reviewed. Continue and monitor for adverse effects.  -Monitor vitals  -Monitor labs  -PT/OT as tolerated        Charting was completed using voice recognition technology and may include unintended errors.    Maureen Byrd MD